# Patient Record
Sex: MALE | Race: WHITE | Employment: PART TIME | URBAN - METROPOLITAN AREA
[De-identification: names, ages, dates, MRNs, and addresses within clinical notes are randomized per-mention and may not be internally consistent; named-entity substitution may affect disease eponyms.]

---

## 2017-01-26 ENCOUNTER — OFFICE VISIT (OUTPATIENT)
Dept: SLEEP MEDICINE | Age: 47
End: 2017-01-26

## 2017-01-26 VITALS
DIASTOLIC BLOOD PRESSURE: 59 MMHG | BODY MASS INDEX: 32.49 KG/M2 | HEIGHT: 65 IN | HEART RATE: 71 BPM | SYSTOLIC BLOOD PRESSURE: 109 MMHG | TEMPERATURE: 97.3 F | OXYGEN SATURATION: 98 % | WEIGHT: 195 LBS

## 2017-01-26 DIAGNOSIS — E66.9 OBESITY (BMI 30-39.9): ICD-10-CM

## 2017-01-26 DIAGNOSIS — G47.33 OBSTRUCTIVE SLEEP APNEA (ADULT) (PEDIATRIC): Primary | ICD-10-CM

## 2017-01-26 NOTE — PATIENT INSTRUCTIONS
217 [facility] [place]. [place], [address]  Tel.  [phone]  Fax. 100 [facility] 60  [place], [address]  Tel.  [phone]  Fax. [phone] 3300 [facility] [address]  Tel.  [phone]  Fax. [phone]     PROPER SLEEP HYGIENE    What to avoid  · Do not have drinks with caffeine, such as coffee or black tea, for 8 hours before bed. · Do not smoke or use other types of tobacco near bedtime. Nicotine is a stimulant and can keep you awake. · Avoid drinking alcohol late in the evening, because it can cause you to wake in the middle of the night. · Do not eat a big meal close to bedtime. If you are hungry, eat a light snack. · Do not drink a lot of water close to bedtime, because the need to urinate may wake you up during the night. · Do not read or watch TV in bed. Use the bed only for sleeping and sexual activity. What to try  · Go to bed at the same time every night, and wake up at the same time every morning. Do not take naps during the day. · Keep your bedroom quiet, dark, and cool. · Get regular exercise, but not within 3 to 4 hours of your bedtime. .  · Sleep on a comfortable pillow and mattress. · If watching the clock makes you anxious, turn it facing away from you so you cannot see the time. · If you worry when you lie down, start a worry book. Well before bedtime, write down your worries, and then set the book and your concerns aside. · Try meditation or other relaxation techniques before you go to bed. · If you cannot fall asleep, get up and go to another room until you feel sleepy. Do something relaxing. Repeat your bedtime routine before you go to bed again. · Make your house quiet and calm about an hour before bedtime. Turn down the lights, turn off the TV, log off the computer, and turn down the volume on music. This can help you relax after a busy day.     Drowsy Driving  The [address] [place] [address] [employer] cites drowsiness as a causing factor in more than 987,308 police reported crashes annually, resulting in 76,000 injuries and 1,500 deaths. Other surveys suggest 55% of people polled have driven while drowsy in the past year, 23% had fallen asleep but not crashed, 3% crashed, and 2% had and accident due to drowsy driving. Who is at risk? Young Drivers: One study of drowsy driving accidents states that 55% of the drivers were under 25 years. Of those, 75% were male. Shift Workers and Travelers: People who work overnight or travel across time zones frequently are at higher risk of experiencing Circadian Rhythm Disorders. They are trying to work and function when their body is programed to sleep. Sleep Deprived: Lack of sleep has a serious impact on your ability to pay attention or focus on a task. Consistently getting less than the average of 8 hours your body needs creates partial or cumulative sleep deprivation. Untreated Sleep Disorders: Sleep Apnea, Narcolepsy, R.L.S., and other sleep disorders (untreated) prevent a person from getting enough restful sleep. This leads to excessive daytime sleepiness and increases the risk for drowsy driving accidents by up to 7 times. Medications / Alcohol: Even over the counter medications can cause drowsiness. Medications that impair a drivers attention should have a warning label. Alcohol naturally makes you sleepy and on its own can cause accidents. Combined with excessive drowsiness its effects are amplified. Signs of Drowsy Driving:   * You don't remember driving the last few miles   * You may drift out of your sameera   * You are unable to focus and your thoughts wander   * You may yawn more often than normal   * You have difficulty keeping your eyes open / nodding off   * Missing traffic signs, speeding, or tailgating  Prevention-   Good sleep hygiene, lifestyle and behavioral choices have the most impact on drowsy driving.  There is no substitute for sleep and the average person requires 8 hours nightly. If you find yourself driving drowsy, stop and sleep. Consider the sleep hygiene tips provided during your visit as well. Medication Refill Policy: Refills for all medications require 1 week advance notice. Please have your pharmacy fax a refill request. We are unable to fax, or call in \"controled substance\" medications and you will need to pick these prescriptions up from our office. [employer] Activation    Thank you for requesting access to [employer]. Please follow the instructions below to securely access and download your online medical record. [employer] allows you to send messages to your doctor, view your test results, renew your prescriptions, schedule appointments, and more. How Do I Sign Up? 1. In your internet browser, go to [email or web address]. 2. Click on the First Time User? Click Here link in the Sign In box. You will see the New Member Sign Up page. 3. Enter your [employer] Access Code exactly as it appears below. You will not need to use this code after youve completed the sign-up process. If you do not sign up before the expiration date, you must request a new code. [employer] Access Code: [email or web address]  Expires: 3/2/2017 11:39 AM (This is the date your [employer] access code will )    4. Enter the last four digits of your Social Security Number (xxxx) and Date of Birth (mm/dd/yyyy) as indicated and click Submit. You will be taken to the next sign-up page. 5. Create a [employer] ID. This will be your [employer] login ID and cannot be changed, so think of one that is secure and easy to remember. 6. Create a [employer] password. You can change your password at any time. 7. Enter your Password Reset Question and Answer. This can be used at a later time if you forget your password. 8. Enter your e-mail address. You will receive e-mail notification when new information is available in [address] 9. Click Sign Up.  You can now view and download portions of your medical record. 10. Click the Download Summary menu link to download a portable copy of your medical information. Additional Information    If you have questions, please call [phone]. Remember, [employer] is NOT to be used for urgent needs. For medical emergencies, dial 911.

## 2017-01-26 NOTE — PROGRESS NOTES
[address] [facility] [address] [place]. [place], [address]  Tel.  [phone]  Fax. 100 [facility] 60  [place], [address] [facility]  Tel.  [phone]  Fax. [phone] [address]   Tel.  [phone]  Fax. [phone]     S>Rogelio Garza is a 55 y.o. male seen for a positive airway pressure follow-up. He reports no problems using the device. He is 100% compliant over the past 30 days. The following problems are identified:    Drowsiness no Problems exhaling no   Snoring no Forget to put on no   Mask Comfortable yes Can't fall asleep no   Dry Mouth no Mask falls off no   Air Leaking no Frequent awakenings no       Download reviewed. He admits that his sleep has improved. No Known Allergies    He has a current medication list which includes the following prescription(s): warfarin, pantoprazole, famotidine, atorvastatin, vit b cplxc#11-calcium-dha-q10, calcium, metronidazole, azelastine, hydrocodone-acetaminophen, omeprazole, cg-af-iu-fe-min-lycopen-lutein, ketoconazole, ondansetron hcl, and diphenoxylate-atropine. .      He  has a past medical history of Arthritis; Down's syndrome; DVT (deep venous thrombosis) ([facility] [address] (7/17/2014); Gastrointestinal disorder; Other ill-defined conditions(799.89); Other ill-defined conditions(799.89); Other ill-defined conditions(799.89); Other ill-defined conditions(799.89); and Thromboembolus ([facility] [address] [place] Sleepiness Score: 17   and Modified F.O.S.Q. Score Total / 2: 19      O>    Visit Vitals    /59    Pulse 71    Temp 97.3 °F (36.3 °C)    Ht 5' 5\" (1.651 m)    Wt 195 lb (88.5 kg)    SpO2 98%    BMI 32.45 kg/m2           General:   Alert, oriented, not in distress   Neck:   No JVD    Chest/Lungs:  symetrical lung expansion , no accessory muscle use    Extremities:  no obvious rashes , negative edema    Neuro:  No focal deficits ;  No obvious tremor    Psych:  Normal affect ,  Normal countenance ; A>    ICD-10-CM ICD-9-CM    1. Obstructive sleep apnea (adult) (pediatric) G47.33 327.23    2. Obesity (BMI 30-39. 9) E66.9 278.00      AHI = 75(8-14). On CPAP :  11-13 cmH2O. Compliant:      yes    Therapeutic Response:  Positive    P>    he will continue on his current pressure settings. * He was asked to contact our office for any problems regarding PAP therapy. * Counseling was provided regarding the importance of regular PAP use and on proper sleep hygiene and safe driving. * Re-enforced proper and regular cleaning for the device. 2.Obesity - I have counseled the patient regarding the benefits of weight loss.       Héctor Mesa MD  Diplomate in Sleep Medicine  [facility]

## 2017-04-07 ENCOUNTER — APPOINTMENT (OUTPATIENT)
Dept: GENERAL RADIOLOGY | Age: 47
End: 2017-04-07
Attending: EMERGENCY MEDICINE
Payer: MEDICARE

## 2017-04-07 ENCOUNTER — HOSPITAL ENCOUNTER (EMERGENCY)
Age: 47
Discharge: HOME | End: 2017-04-07
Attending: EMERGENCY MEDICINE
Payer: MEDICARE

## 2017-04-07 VITALS
RESPIRATION RATE: 16 BRPM | TEMPERATURE: 98.3 F | BODY MASS INDEX: 32.14 KG/M2 | SYSTOLIC BLOOD PRESSURE: 108 MMHG | OXYGEN SATURATION: 98 % | DIASTOLIC BLOOD PRESSURE: 41 MMHG | HEIGHT: 65 IN | WEIGHT: 192.9 LBS | HEART RATE: 85 BPM

## 2017-04-07 DIAGNOSIS — B34.9 VIRAL ILLNESS: ICD-10-CM

## 2017-04-07 DIAGNOSIS — R11.2 NON-INTRACTABLE VOMITING WITH NAUSEA, UNSPECIFIED VOMITING TYPE: ICD-10-CM

## 2017-04-07 DIAGNOSIS — K52.9 GASTROENTERITIS: Primary | ICD-10-CM

## 2017-04-07 LAB
FLUAV AG NPH QL IA: NEGATIVE
FLUBV AG NOSE QL IA: NEGATIVE

## 2017-04-07 PROCEDURE — 99282 EMERGENCY DEPT VISIT SF MDM: CPT

## 2017-04-07 PROCEDURE — 74011250636 HC RX REV CODE- 250/636: Performed by: EMERGENCY MEDICINE

## 2017-04-07 PROCEDURE — 96374 THER/PROPH/DIAG INJ IV PUSH: CPT

## 2017-04-07 PROCEDURE — 87804 INFLUENZA ASSAY W/OPTIC: CPT | Performed by: EMERGENCY MEDICINE

## 2017-04-07 PROCEDURE — 96375 TX/PRO/DX INJ NEW DRUG ADDON: CPT

## 2017-04-07 PROCEDURE — 71020 XR CHEST PA LAT: CPT

## 2017-04-07 PROCEDURE — 96361 HYDRATE IV INFUSION ADD-ON: CPT

## 2017-04-07 RX ORDER — ONDANSETRON 4 MG/1
4 TABLET, FILM COATED ORAL
Qty: 20 TAB | Refills: 0 | Status: SHIPPED | OUTPATIENT
Start: 2017-04-07

## 2017-04-07 RX ORDER — KETOROLAC TROMETHAMINE 30 MG/ML
15 INJECTION, SOLUTION INTRAMUSCULAR; INTRAVENOUS
Status: COMPLETED | OUTPATIENT
Start: 2017-04-07 | End: 2017-04-07

## 2017-04-07 RX ORDER — ONDANSETRON 2 MG/ML
4 INJECTION INTRAMUSCULAR; INTRAVENOUS
Status: COMPLETED | OUTPATIENT
Start: 2017-04-07 | End: 2017-04-07

## 2017-04-07 RX ADMIN — KETOROLAC TROMETHAMINE 15 MG: 30 INJECTION, SOLUTION INTRAMUSCULAR at 04:34

## 2017-04-07 RX ADMIN — SODIUM CHLORIDE 1000 ML: 900 INJECTION, SOLUTION INTRAVENOUS at 04:34

## 2017-04-07 RX ADMIN — ONDANSETRON HYDROCHLORIDE 4 MG: 2 INJECTION, SOLUTION INTRAMUSCULAR; INTRAVENOUS at 04:34

## 2017-04-07 NOTE — DISCHARGE INSTRUCTIONS
Gastroenteritis: Care Instructions  Your Care Instructions  Gastroenteritis is an illness that may cause nausea, vomiting, and diarrhea. It is sometimes called \"stomach flu. \" It can be caused by bacteria or a virus. You will probably begin to feel better in 1 to 2 days. In the meantime, get plenty of rest and make sure you do not become dehydrated. Dehydration occurs when your body loses too much fluid. Follow-up care is a key part of your treatment and safety. Be sure to make and go to all appointments, and call your doctor if you are having problems. Its also a good idea to know your test results and keep a list of the medicines you take. How can you care for yourself at home? · If your doctor prescribed antibiotics, take them as directed. Do not stop taking them just because you feel better. You need to take the full course of antibiotics. · Drink plenty of fluids to prevent dehydration, enough so that your urine is light yellow or clear like water. Choose water and other caffeine-free clear liquids until you feel better. If you have kidney, heart, or liver disease and have to limit fluids, talk with your doctor before you increase your fluid intake. · Drink fluids slowly, in frequent, small amounts, because drinking too much too fast can cause vomiting. · Begin eating mild foods, such as dry toast, yogurt, applesauce, bananas, and rice. Avoid spicy, hot, or high-fat foods, and do not drink alcohol or caffeine for a day or two. Do not drink milk or eat ice cream until you are feeling better. How to prevent gastroenteritis  · Keep hot foods hot and cold foods cold. · Do not eat meats, dressings, salads, or other foods that have been kept at room temperature for more than 2 hours. · Use a thermometer to check your refrigerator. It should be between 34°F and 40°F.  · Defrost meats in the refrigerator or microwave, not on the kitchen counter. · Keep your hands and your kitchen clean.  Wash your hands, cutting boards, and countertops with hot soapy water frequently. · Cook meat until it is well done. · Do not eat raw eggs or uncooked sauces made with raw eggs. · Do not take chances. If food looks or tastes spoiled, throw it out. When should you call for help? Call 911 anytime you think you may need emergency care. For example, call if:  · You vomit blood or what looks like coffee grounds. · You passed out (lost consciousness). · You pass maroon or very bloody stools. Call your doctor now or seek immediate medical care if:  · You have severe belly pain. · You have signs of needing more fluids. You have sunken eyes, a dry mouth, and pass only a little dark urine. · You feel like you are going to faint. · You have increased belly pain that does not go away in 1 to 2 days. · You have new or increased nausea, or you are vomiting. · You have a new or higher fever. · Your stools are black and tarlike or have streaks of blood. Watch closely for changes in your health, and be sure to contact your doctor if:  · You are dizzy or lightheaded. · You urinate less than usual, or your urine is dark yellow or brown. · You do not feel better with each day that goes by. Where can you learn more? Go to http://kyara-quinton.info/. Enter N142 in the search box to learn more about \"Gastroenteritis: Care Instructions. \"  Current as of: May 24, 2016  Content Version: 11.2  © 4690-6971 [employer]. Care instructions adapted under license by [employer] (which disclaims liability or warranty for this information). If you have questions about a medical condition or this instruction, always ask your healthcare professional. [address] any warranty or liability for your use of this information.

## 2017-04-07 NOTE — ED PROVIDER NOTES
HPI Comments: Cecile Segundo is a 52 y.o. male with PMhx significant for arthritis, gastrointestinal disorder, and Down's syndrome who presents ambulatory to the ED with cc of N/V/D since 2 AM this morning. per relative, the pt also c/o associated SOB, CP, chills, dry cough, nasal congestion, rhinorrhea, sore throat, HA, and abdominal pain. The pt's relative reports that the pt has recent sick contact. The pt specifically denies fever at this time. SHx: -tobacco, -EtOH, -illicit drug use    PCP: Radha Mcdaniel MD    There are no other complaints, changes or physical findings at this time. The history is provided by the patient and a relative. No  was used.         Past Medical History:   Diagnosis Date    Arthritis     scoliosis    Down's syndrome     DVT (deep venous thrombosis) ([facility] [address] 7/17/2014    Gastrointestinal disorder     GERD, Robin's Esophagus    Other ill-defined conditions(799.89)     Down Syndrom with mild mental retardation    Other ill-defined conditions(799.89)     mixed hyperlipidemia/hypercholesteremia    Other ill-defined conditions(799.89)     myopia    Other ill-defined conditions(799.89)     sleep apnea, uses cpap at night    Thromboembolus [facility])        Past Surgical History:   Procedure Laterality Date    HX CHOLECYSTECTOMY      HX OTHER SURGICAL      convergent strabismus s/p corrective surgery         Family History:   Problem Relation Age of Onset    Hypertension Mother     Diabetes Mother     Asthma Father     Diabetes Father     Asthma Sister     Bleeding Prob Maternal Grandfather     Arthritis-osteo Paternal Grandmother     Alcohol abuse Paternal Grandmother     Lung Disease Paternal Grandmother     Hypertension Paternal Grandmother     Alcohol abuse Paternal Grandfather     Heart Disease Paternal Grandfather        Social History     Social History    Marital status: SINGLE     Spouse name: N/A    Number of children: N/A    Years of education: N/A     Occupational History    Not on file. Social History Main Topics    Smoking status: Never Smoker    Smokeless tobacco: Not on file    Alcohol use No    Drug use: No    Sexual activity: Not on file     Other Topics Concern    Not on file     Social History Narrative         ALLERGIES: Review of patient's allergies indicates no known allergies. Review of Systems   Constitutional: Positive for chills. Negative for fever. HENT: Positive for congestion (nasal), rhinorrhea and sore throat. Respiratory: Positive for cough and shortness of breath. Cardiovascular: Positive for chest pain. Gastrointestinal: Positive for abdominal pain, diarrhea, nausea and vomiting. Negative for constipation. Neurological: Positive for headaches. Negative for weakness and numbness. All other systems reviewed and are negative. Patient Vitals for the past 12 hrs:   Temp Pulse Resp BP SpO2   04/07/17 0621 - 85 16 - -   04/07/17 0327 98.3 °F (36.8 °C) 100 18 108/41 98 %            Physical Exam   Constitutional: He is oriented to person, place, and time. He appears well-developed and well-nourished. HENT:   Head: Normocephalic and atraumatic. Pt sniffling on exam.   Eyes: Conjunctivae and EOM are normal.   Neck: Normal range of motion. Neck supple. Cardiovascular: Normal rate and regular rhythm. Pulmonary/Chest: Effort normal and breath sounds normal. No respiratory distress. Abdominal: Soft. He exhibits no distension. There is tenderness (diffuse). Musculoskeletal: Normal range of motion. Neurological: He is alert and oriented to person, place, and time. Skin: Skin is warm and dry. Psychiatric: He has a normal mood and affect. Nursing note and vitals reviewed.        MDM  Number of Diagnoses or Management Options  Gastroenteritis:   Non-intractable vomiting with nausea, unspecified vomiting type:   Viral illness:   Diagnosis management comments: Nausea/Vomiting/diarrhea:  Most likely gastroenteritis although gastritis, colitis, IBD, IBS on the differential.  Will treat with antiemetic and IVF to replete losses. Will assess basic labs and electrolytes. Will give pain medication PRN. He is instructed to push clear fluids, small amounts frequently until improving, then advance diet as tolerated. Imodium OTC prn for diarrhea. May use Gatorade Pedialyte for rehydration. May use BRAT diet. Call or office visit prn if symptoms not responding as expected or develops high fever, significant abdominal pain or bloody stool. Amount and/or Complexity of Data Reviewed  Clinical lab tests: ordered and reviewed  Tests in the radiology section of CPT®: ordered and reviewed  Obtain history from someone other than the patient: yes (Relative)  Review and summarize past medical records: yes  Independent visualization of images, tracings, or specimens: yes    Patient Progress  Patient progress: stable    ED Course       Procedures    PROGRESS NOTE:  5:09 AM  The pt was reevaluated; he states that he is feeling better at this time. Written by Tika Whitney ED Scribe, as dictated by Alondra Venegas M.D.      LABORATORY TESTS:  Recent Results (from the past 12 hour(s))   INFLUENZA A & B AG (RAPID TEST)    Collection Time: 04/07/17  4:16 AM   Result Value Ref Range    Influenza A Antigen NEGATIVE  NEG      Influenza B Antigen NEGATIVE  NEG         IMAGING RESULTS:  XR CHEST PA LAT   Final Result   INDICATION: cough     EXAM: CXR 2 Views.     COMPARISON: 5/22/2014.     FINDINGS: Frontal and lateral views of the chest show new density in the left  infrahilar region which could represent nodule/mass, scarring, atelectasis or  early pneumonia. Right lung remains clear.     Heart size is normal. There is no pulmonary edema. There is no evident  pneumothorax, adenopathy or pleural effusion.         IMPRESSION  IMPRESSION: Left lung density as discussed.        MEDICATIONS GIVEN:  Medications   sodium chloride 0.9 % bolus infusion 1,000 mL (1,000 mL IntraVENous New Bag 4/7/17 0434)   ondansetron (ZOFRAN) injection 4 mg (4 mg IntraVENous Given 4/7/17 0434)   ketorolac (TORADOL) injection 15 mg (15 mg IntraVENous Given 4/7/17 0434)       IMPRESSION:  1. Gastroenteritis    2. Viral illness    3. Non-intractable vomiting with nausea, unspecified vomiting type        PLAN:  1. Discharge Medication List as of 4/7/2017  6:04 AM      CONTINUE these medications which have CHANGED    Details   ondansetron hcl (ZOFRAN, AS HYDROCHLORIDE,) 4 mg tablet Take 1 Tab by mouth every eight (8) hours as needed for Nausea. , Print, Disp-20 Tab, R-0         CONTINUE these medications which have NOT CHANGED    Details   warfarin (COUMADIN) 3 mg tablet two (2) times a day., Historical Med, R-12      pantoprazole (PROTONIX) 40 mg tablet Take 40 mg by mouth daily. , Historical Med      famotidine (PEPCID) 20 mg tablet Take 1 Tab by mouth two (2) times a day., Print, Disp-20 Tab, R-0      HYDROcodone-acetaminophen (NORCO) 5-325 mg per tablet Take 1 Tab by mouth every four (4) hours as needed for Pain., Print, Disp-20 Tab, R-0      omeprazole (PRILOSEC) 40 mg capsule Take 40 mg by mouth two (2) times a day. Historical Med, 40 mg      atorvastatin (LIPITOR) 40 mg tablet Take 40 mg by mouth daily. Historical Med, 40 mg      ZD-VH-EQ-Fe-Min-Lycopen-Lutein (CENTRUM) 0.4-162-18 mg Tab Take 1 Tab by mouth daily. Historical Med, 1 Tab      vit B cmplx & C #11-ca-dha-Q10 (BRAIN MIGHT-DHA-CO Q10) 140-10-10 mg Tab Take 1 Tab by mouth daily. Historical Med, 1 Tab      calcium 600 mg cap Take 1 Tab by mouth daily. Historical Med, 1 Tab      metroNIDAZOLE (METROGEL) 1 % topical gel Apply  to affected area daily. Use a thin layer to affected areas after washingHistorical Med      ketoconazole (XOLEGEL) 2 % topical gel Apply  to affected area daily. Historical Med      Azelastine (ASTEPRO) 0.15 % (205.5 mcg) nasal spray Use in each nostril as directed2 Sprays by Both Nostrils route as needed. Historical Med, 2 Spray      diphenoxylate-atropine (LOMOTIL) 2.5-0.025 mg per tablet Take 1 Tab by mouth four (4) times daily as needed for Diarrhea. Print, 1 Tab, Disp-20 Tab, R-0           2. Follow-up Information     Follow up With Details Comments Contact Info    Adal Prince MD  If symptoms worsen [address] [place] [address]  [phone]          Return to ED if worse       DISCHARGE NOTE:  6:08 AM  The patient has been re-evaluated and is ready for discharge. Reviewed available results with patient. Counseled patient on diagnosis and care plan. Patient has expressed understanding, and all questions have been answered. Patient agrees with plan and agrees to follow up as recommended, or return to the ED if their symptoms worsen. Discharge instructions have been provided and explained to the patient, along with reasons to return to the ED. This note is prepared by Rishi Ron, acting as Scribe for Kirill Gomez M.D. Kirill Gomez M.D: The scribe's documentation has been prepared under my direction and personally reviewed by me in its entirety. I confirm that the note above accurately reflects all work, treatment, procedures, and medical decision making performed by me.

## 2017-11-02 ENCOUNTER — HOSPITAL ENCOUNTER (OUTPATIENT)
Dept: GENERAL RADIOLOGY | Age: 47
Discharge: HOME | End: 2017-11-02
Payer: MEDICARE

## 2017-11-02 DIAGNOSIS — R05.9 COUGH: ICD-10-CM

## 2017-11-02 PROCEDURE — 71020 XR CHEST PA LAT: CPT

## 2018-01-12 ENCOUNTER — HOSPITAL ENCOUNTER (OUTPATIENT)
Dept: CT IMAGING | Age: 48
Discharge: HOME | End: 2018-01-12
Attending: FAMILY MEDICINE
Payer: MEDICARE

## 2018-01-12 DIAGNOSIS — R05.9 COUGH: ICD-10-CM

## 2018-01-12 DIAGNOSIS — R10.84 GENERALIZED ABDOMINAL PAIN: ICD-10-CM

## 2018-01-12 PROCEDURE — 74177 CT ABD & PELVIS W/CONTRAST: CPT

## 2018-01-12 PROCEDURE — 74011000255 HC RX REV CODE- 255: Performed by: FAMILY MEDICINE

## 2018-01-12 PROCEDURE — 71260 CT THORAX DX C+: CPT

## 2018-01-12 PROCEDURE — 74011250636 HC RX REV CODE- 250/636: Performed by: FAMILY MEDICINE

## 2018-01-12 PROCEDURE — 74011636320 HC RX REV CODE- 636/320: Performed by: FAMILY MEDICINE

## 2018-01-12 RX ORDER — SODIUM CHLORIDE 9 MG/ML
50 INJECTION, SOLUTION INTRAVENOUS
Status: COMPLETED | OUTPATIENT
Start: 2018-01-12 | End: 2018-01-12

## 2018-01-12 RX ORDER — SODIUM CHLORIDE 0.9 % (FLUSH) 0.9 %
10 SYRINGE (ML) INJECTION
Status: COMPLETED | OUTPATIENT
Start: 2018-01-12 | End: 2018-01-12

## 2018-01-12 RX ORDER — BARIUM SULFATE 20 MG/ML
900 SUSPENSION ORAL
Status: COMPLETED | OUTPATIENT
Start: 2018-01-12 | End: 2018-01-12

## 2018-01-12 RX ADMIN — BARIUM SULFATE 900 ML: 21 SUSPENSION ORAL at 13:33

## 2018-01-12 RX ADMIN — Medication 10 ML: at 13:33

## 2018-01-12 RX ADMIN — SODIUM CHLORIDE 50 ML/HR: 900 INJECTION, SOLUTION INTRAVENOUS at 13:33

## 2018-01-12 RX ADMIN — IOPAMIDOL 95 ML: 755 INJECTION, SOLUTION INTRAVENOUS at 13:33

## 2018-01-23 ENCOUNTER — TELEPHONE (OUTPATIENT)
Dept: SLEEP MEDICINE | Age: 48
End: 2018-01-23

## 2018-01-23 DIAGNOSIS — G47.33 OBSTRUCTIVE SLEEP APNEA SYNDROME: Primary | ICD-10-CM

## 2018-01-24 ENCOUNTER — DOCUMENTATION ONLY (OUTPATIENT)
Dept: SLEEP MEDICINE | Age: 48
End: 2018-01-24

## 2018-04-10 ENCOUNTER — HOSPITAL ENCOUNTER (OUTPATIENT)
Dept: CT IMAGING | Age: 48
Discharge: HOME | End: 2018-04-10
Attending: OTOLARYNGOLOGY
Payer: MEDICARE

## 2018-04-10 DIAGNOSIS — M47.812 NECK ARTHROPATHY: ICD-10-CM

## 2018-04-10 PROCEDURE — 74011636320 HC RX REV CODE- 636/320: Performed by: OTOLARYNGOLOGY

## 2018-04-10 PROCEDURE — 70491 CT SOFT TISSUE NECK W/DYE: CPT

## 2018-04-10 PROCEDURE — 74011250636 HC RX REV CODE- 250/636: Performed by: OTOLARYNGOLOGY

## 2018-04-10 RX ORDER — SODIUM CHLORIDE 9 MG/ML
50 INJECTION, SOLUTION INTRAVENOUS
Status: COMPLETED | OUTPATIENT
Start: 2018-04-10 | End: 2018-04-10

## 2018-04-10 RX ORDER — SODIUM CHLORIDE 0.9 % (FLUSH) 0.9 %
10 SYRINGE (ML) INJECTION
Status: COMPLETED | OUTPATIENT
Start: 2018-04-10 | End: 2018-04-10

## 2018-04-10 RX ADMIN — Medication 10 ML: at 08:42

## 2018-04-10 RX ADMIN — IOPAMIDOL 100 ML: 755 INJECTION, SOLUTION INTRAVENOUS at 08:42

## 2018-04-10 RX ADMIN — SODIUM CHLORIDE 50 ML/HR: 900 INJECTION, SOLUTION INTRAVENOUS at 08:42

## 2018-05-07 ENCOUNTER — DOCUMENTATION ONLY (OUTPATIENT)
Dept: SLEEP MEDICINE | Age: 48
End: 2018-05-07

## 2018-05-07 ENCOUNTER — OFFICE VISIT (OUTPATIENT)
Dept: SLEEP MEDICINE | Age: 48
End: 2018-05-07

## 2018-05-07 VITALS
HEIGHT: 65 IN | SYSTOLIC BLOOD PRESSURE: 106 MMHG | HEART RATE: 71 BPM | OXYGEN SATURATION: 95 % | BODY MASS INDEX: 33.99 KG/M2 | WEIGHT: 204 LBS | DIASTOLIC BLOOD PRESSURE: 77 MMHG

## 2018-05-07 DIAGNOSIS — G47.33 OBSTRUCTIVE SLEEP APNEA SYNDROME: Primary | ICD-10-CM

## 2018-05-07 DIAGNOSIS — E66.9 OBESITY (BMI 30-39.9): ICD-10-CM

## 2018-05-07 RX ORDER — SODIUM, POTASSIUM,MAG SULFATES 17.5-3.13G
SOLUTION, RECONSTITUTED, ORAL ORAL
Refills: 0 | COMMUNITY
Start: 2018-05-03 | End: 2018-05-30

## 2018-05-07 NOTE — PROGRESS NOTES
[address] [facility]. [place], [address]  Tel.  [phone]  Fax. 100 [facility] 60  [place], [address] [facility]  Tel.  [phone]  Fax. [phone] [address]   Tel.  [phone]  Fax. [phone]     S>Rogelio Hammond is a 50 y.o. male seen for a positive airway pressure follow-up. He reports no problems using the device. The following problems are identified:    Drowsiness no Problems exhaling no   Snoring no Forget to put on no   Mask Comfortable yes Can't fall asleep no   Dry Mouth no Mask falls off no   Air Leaking no Frequent awakenings no     Download reviewed. He admits that his sleep has improved. Therapy Apnea Index averaged over PAP use: 4 /hr which reflects improved sleep breathing condition. No Known Allergies    He has a current medication list which includes the following prescription(s): suprep bowel prep kit, ondansetron hcl, warfarin, pantoprazole, famotidine, atorvastatin, vit b cplxc#11-calcium-dha-q10, calcium, metronidazole, azelastine, hydrocodone-acetaminophen, omeprazole, kf-jc-is-fe-min-lycopen-lutein, ketoconazole, and diphenoxylate-atropine. .      He  has a past medical history of Arthritis; Down's syndrome; [facility]) [facility] (7/17/2014); Gastrointestinal disorder; Other ill-defined conditions(799.89); Other ill-defined conditions(799.89); Other ill-defined conditions(799.89); Other ill-defined conditions(799.89); and Thromboembolus ([facility] [place] Sleepiness Score: 19   and Modified F.O.S.Q. Score Total / 2: 19.5   which reflect improved sleep quality over therapy time.     O>    Visit Vitals    /77    Pulse 71    Ht 5' 5\" (1.651 m)    Wt 204 lb (92.5 kg)    SpO2 95%    BMI 33.95 kg/m2           General:   Alert, oriented, not in distress   Neck:   No JVD    Chest/Lungs:  symetrical lung expansion , no accessory muscle use    Extremities:  no obvious rashes , negative edema    Neuro:  No focal deficits ; No obvious tremor    Psych:  Normal affect ,  Normal countenance ;         A>    ICD-10-CM ICD-9-CM    1. Obstructive sleep apnea syndrome G47.33 327.23 AMB SUPPLY ORDER   2. Obesity (BMI 30-39. 9) E66.9 278.00      AHI = 75(8-14). On CPAP :  11-13 cmH2O. Compliant:      yes    Therapeutic Response:  Positive    P>      * Follow-up Disposition:  Return in about 1 year (around 5/7/2019). he will continue on his current pressure settings. I have ordered replacement supplies    * He was asked to contact our office for any problems regarding PAP therapy. * Counseling was provided regarding the importance of regular PAP use and on proper sleep hygiene and safe driving. * Re-enforced proper and regular cleaning for the device. 2. Obesity - I have counseled the patient regarding the benefits of weight loss.     Yolanda Dickerson MD  Diplomate in Sleep Medicine  [facility]

## 2018-05-07 NOTE — PATIENT INSTRUCTIONS
[address] [place]. [place], [address]  Tel.  [phone]  Fax. 100 [facility] 60  [place], [address]  Tel.  [phone]  Fax. [phone] [address] [facility] [address]  Tel.  [phone]  Fax. [phone]     PROPER SLEEP HYGIENE    What to avoid  · Do not have drinks with caffeine, such as coffee or black tea, for 8 hours before bed. · Do not smoke or use other types of tobacco near bedtime. Nicotine is a stimulant and can keep you awake. · Avoid drinking alcohol late in the evening, because it can cause you to wake in the middle of the night. · Do not eat a big meal close to bedtime. If you are hungry, eat a light snack. · Do not drink a lot of water close to bedtime, because the need to urinate may wake you up during the night. · Do not read or watch TV in bed. Use the bed only for sleeping and sexual activity. What to try  · Go to bed at the same time every night, and wake up at the same time every morning. Do not take naps during the day. · Keep your bedroom quiet, dark, and cool. · Get regular exercise, but not within 3 to 4 hours of your bedtime. .  · Sleep on a comfortable pillow and mattress. · If watching the clock makes you anxious, turn it facing away from you so you cannot see the time. · If you worry when you lie down, start a worry book. Well before bedtime, write down your worries, and then set the book and your concerns aside. · Try meditation or other relaxation techniques before you go to bed. · If you cannot fall asleep, get up and go to another room until you feel sleepy. Do something relaxing. Repeat your bedtime routine before you go to bed again. · Make your house quiet and calm about an hour before bedtime. Turn down the lights, turn off the TV, log off the computer, and turn down the volume on music. This can help you relax after a busy day.     Drowsy Driving  The [address] [place] [address] Road Traffic Safety Administration cites drowsiness as a causing factor in more than 785,543 police reported crashes annually, resulting in 76,000 injuries and 1,500 deaths. Other surveys suggest 55% of people polled have driven while drowsy in the past year, 23% had fallen asleep but not crashed, 3% crashed, and 2% had and accident due to drowsy driving. Who is at risk? Young Drivers: One study of drowsy driving accidents states that 55% of the drivers were under 25 years. Of those, 75% were male. Shift Workers and Travelers: People who work overnight or travel across time zones frequently are at higher risk of experiencing Circadian Rhythm Disorders. They are trying to work and function when their body is programed to sleep. Sleep Deprived: Lack of sleep has a serious impact on your ability to pay attention or focus on a task. Consistently getting less than the average of 8 hours your body needs creates partial or cumulative sleep deprivation. Untreated Sleep Disorders: Sleep Apnea, Narcolepsy, R.L.S., and other sleep disorders (untreated) prevent a person from getting enough restful sleep. This leads to excessive daytime sleepiness and increases the risk for drowsy driving accidents by up to 7 times. Medications / Alcohol: Even over the counter medications can cause drowsiness. Medications that impair a drivers attention should have a warning label. Alcohol naturally makes you sleepy and on its own can cause accidents. Combined with excessive drowsiness its effects are amplified. Signs of Drowsy Driving:   * You don't remember driving the last few miles   * You may drift out of your sameera   * You are unable to focus and your thoughts wander   * You may yawn more often than normal   * You have difficulty keeping your eyes open / nodding off   * Missing traffic signs, speeding, or tailgating  Prevention-   Good sleep hygiene, lifestyle and behavioral choices have the most impact on drowsy driving.  There is no substitute for sleep and the average person requires 8 hours nightly. If you find yourself driving drowsy, stop and sleep. Consider the sleep hygiene tips provided during your visit as well. Medication Refill Policy: Refills for all medications require 1 week advance notice. Please have your pharmacy fax a refill request. We are unable to fax, or call in \"controled substance\" medications and you will need to pick these prescriptions up from our office. [employer] Activation    Thank you for requesting access to [employer]. Please follow the instructions below to securely access and download your online medical record. [employer] allows you to send messages to your doctor, view your test results, renew your prescriptions, schedule appointments, and more. How Do I Sign Up? 1. In your internet browser, go to [email or web address]. 2. Click on the First Time User? Click Here link in the Sign In box. You will see the New Member Sign Up page. 3. Enter your [employer] Access Code exactly as it appears below. You will not need to use this code after youve completed the sign-up process. If you do not sign up before the expiration date, you must request a new code. [employer] Access Code: [email or web address]  Expires: 2018  9:59 AM (This is the date your [employer] access code will )    4. Enter the last four digits of your Social Security Number (xxxx) and Date of Birth (mm/dd/yyyy) as indicated and click Submit. You will be taken to the next sign-up page. 5. Create a [employer] ID. This will be your [employer] login ID and cannot be changed, so think of one that is secure and easy to remember. 6. Create a [employer] password. You can change your password at any time. 7. Enter your Password Reset Question and Answer. This can be used at a later time if you forget your password. 8. Enter your e-mail address. You will receive e-mail notification when new information is available in [address] 9. Click Sign Up.  You can now view and download portions of your medical record. 10. Click the Download Summary menu link to download a portable copy of your medical information. Additional Information    If you have questions, please call [phone]. Remember, [employer] is NOT to be used for urgent needs. For medical emergencies, dial 911.

## 2018-05-31 ENCOUNTER — HOSPITAL ENCOUNTER (OUTPATIENT)
Age: 48
Setting detail: OUTPATIENT SURGERY
Discharge: HOME | End: 2018-05-31
Attending: INTERNAL MEDICINE | Admitting: INTERNAL MEDICINE
Payer: MEDICARE

## 2018-05-31 ENCOUNTER — ANESTHESIA (OUTPATIENT)
Dept: ENDOSCOPY | Age: 48
End: 2018-05-31
Payer: MEDICARE

## 2018-05-31 ENCOUNTER — ANESTHESIA EVENT (OUTPATIENT)
Dept: ENDOSCOPY | Age: 48
End: 2018-05-31
Payer: MEDICARE

## 2018-05-31 VITALS
OXYGEN SATURATION: 98 % | BODY MASS INDEX: 32.53 KG/M2 | SYSTOLIC BLOOD PRESSURE: 112 MMHG | HEIGHT: 65 IN | HEART RATE: 61 BPM | WEIGHT: 195.25 LBS | TEMPERATURE: 97.5 F | RESPIRATION RATE: 18 BRPM | DIASTOLIC BLOOD PRESSURE: 64 MMHG

## 2018-05-31 PROCEDURE — 77030009426 HC FCPS BIOP ENDOSC BSC -B: Performed by: INTERNAL MEDICINE

## 2018-05-31 PROCEDURE — 76060000031 HC ANESTHESIA FIRST 0.5 HR: Performed by: INTERNAL MEDICINE

## 2018-05-31 PROCEDURE — 74011000250 HC RX REV CODE- 250

## 2018-05-31 PROCEDURE — 74011250636 HC RX REV CODE- 250/636

## 2018-05-31 PROCEDURE — 88305 TISSUE EXAM BY PATHOLOGIST: CPT | Performed by: INTERNAL MEDICINE

## 2018-05-31 PROCEDURE — 76040000019: Performed by: INTERNAL MEDICINE

## 2018-05-31 PROCEDURE — 74011250636 HC RX REV CODE- 250/636: Performed by: INTERNAL MEDICINE

## 2018-05-31 RX ORDER — DEXTROMETHORPHAN/PSEUDOEPHED 2.5-7.5/.8
1.2 DROPS ORAL
Status: DISCONTINUED | OUTPATIENT
Start: 2018-05-31 | End: 2018-05-31 | Stop reason: HOSPADM

## 2018-05-31 RX ORDER — SODIUM CHLORIDE 0.9 % (FLUSH) 0.9 %
5-10 SYRINGE (ML) INJECTION EVERY 8 HOURS
Status: DISCONTINUED | OUTPATIENT
Start: 2018-05-31 | End: 2018-05-31 | Stop reason: HOSPADM

## 2018-05-31 RX ORDER — PROPOFOL 10 MG/ML
INJECTION, EMULSION INTRAVENOUS AS NEEDED
Status: DISCONTINUED | OUTPATIENT
Start: 2018-05-31 | End: 2018-05-31 | Stop reason: HOSPADM

## 2018-05-31 RX ORDER — LIDOCAINE HYDROCHLORIDE 20 MG/ML
INJECTION, SOLUTION EPIDURAL; INFILTRATION; INTRACAUDAL; PERINEURAL AS NEEDED
Status: DISCONTINUED | OUTPATIENT
Start: 2018-05-31 | End: 2018-05-31 | Stop reason: HOSPADM

## 2018-05-31 RX ORDER — EPINEPHRINE 0.1 MG/ML
1 INJECTION INTRACARDIAC; INTRAVENOUS
Status: DISCONTINUED | OUTPATIENT
Start: 2018-05-31 | End: 2018-05-31 | Stop reason: HOSPADM

## 2018-05-31 RX ORDER — NALOXONE HYDROCHLORIDE 0.4 MG/ML
0.4 INJECTION, SOLUTION INTRAMUSCULAR; INTRAVENOUS; SUBCUTANEOUS
Status: DISCONTINUED | OUTPATIENT
Start: 2018-05-31 | End: 2018-05-31 | Stop reason: HOSPADM

## 2018-05-31 RX ORDER — SODIUM CHLORIDE 0.9 % (FLUSH) 0.9 %
5-10 SYRINGE (ML) INJECTION AS NEEDED
Status: DISCONTINUED | OUTPATIENT
Start: 2018-05-31 | End: 2018-05-31 | Stop reason: HOSPADM

## 2018-05-31 RX ORDER — FLUMAZENIL 0.1 MG/ML
0.2 INJECTION INTRAVENOUS
Status: DISCONTINUED | OUTPATIENT
Start: 2018-05-31 | End: 2018-05-31 | Stop reason: HOSPADM

## 2018-05-31 RX ORDER — SODIUM CHLORIDE 9 MG/ML
50 INJECTION, SOLUTION INTRAVENOUS CONTINUOUS
Status: DISCONTINUED | OUTPATIENT
Start: 2018-05-31 | End: 2018-05-31 | Stop reason: HOSPADM

## 2018-05-31 RX ORDER — ATROPINE SULFATE 0.1 MG/ML
0.5 INJECTION INTRAVENOUS
Status: DISCONTINUED | OUTPATIENT
Start: 2018-05-31 | End: 2018-05-31 | Stop reason: HOSPADM

## 2018-05-31 RX ADMIN — PROPOFOL 20 MG: 10 INJECTION, EMULSION INTRAVENOUS at 07:59

## 2018-05-31 RX ADMIN — PROPOFOL 30 MG: 10 INJECTION, EMULSION INTRAVENOUS at 07:52

## 2018-05-31 RX ADMIN — PROPOFOL 20 MG: 10 INJECTION, EMULSION INTRAVENOUS at 07:56

## 2018-05-31 RX ADMIN — PROPOFOL 50 MG: 10 INJECTION, EMULSION INTRAVENOUS at 07:45

## 2018-05-31 RX ADMIN — LIDOCAINE HYDROCHLORIDE 60 MG: 20 INJECTION, SOLUTION EPIDURAL; INFILTRATION; INTRACAUDAL; PERINEURAL at 07:45

## 2018-05-31 RX ADMIN — SODIUM CHLORIDE: 900 INJECTION, SOLUTION INTRAVENOUS at 07:30

## 2018-05-31 RX ADMIN — PROPOFOL 20 MG: 10 INJECTION, EMULSION INTRAVENOUS at 07:48

## 2018-05-31 NOTE — DISCHARGE INSTRUCTIONS
Arminda Magallon  [id number]  1970    COLON DISCHARGE INSTRUCTIONS  Discomfort:  Redness at IV site- apply warm compress to area; if redness or soreness persist- contact your physician  There may be a slight amount of blood passed from the rectum  Gaseous discomfort- walking, belching will help relieve any discomfort  You may not operate a vehicle for 12 hours  You may not engage in an occupation involving machinery or appliances for rest of today  You may not drink alcoholic beverages for at least 12 hours  Avoid making any critical decisions for at least 24 hour  DIET:   Regular diet    - however -  remember your colon is empty and a heavy meal will produce gas. ACTIVITY:  It is recommended that you spend the remainder of the day resting -  avoid any strenuous activity. CALL M.D.   ANY SIGN OF:   Increasing pain, nausea, vomiting  Abdominal distension (swelling)  New increased bleeding (oral or rectal)  Fever (chills)    Follow-up Instructions:   Call Dr. Leonid Pappas results in 10 days  Telephone # [phone]  Brief Findings: normal        DISCHARGE SUMMARY from Nurse    The following personal items collected during your admission are returned to you:   Dental Appliance: Dental Appliances: None  Vision: Visual Aid: Glasses  Hearing Aid:    Jewelry:    Clothing:    Other Valuables:    Valuables sent to safe:

## 2018-05-31 NOTE — H&P
Gastroenterology History and Physical    Patient: Jazlyn Jay    Physician: Linwood Santos MD    Vital Signs: Blood pressure 133/67, pulse 66, temperature 98 °F (36.7 °C), resp. rate 27, height 5' 5\" (1.651 m), weight 88.6 kg (195 lb 4 oz), SpO2 98 %. Allergies: No Known Allergies    Indication: diarrhea    History:  Past Medical History:   Diagnosis Date    Arthritis     scoliosis    Down's syndrome     DVT (deep venous thrombosis) ([facility] 7/17/2014    Gastrointestinal disorder     GERD, Robin's Esophagus    Other ill-defined conditions(799.89)     Down Syndrom with mild mental retardation    Other ill-defined conditions(799.89)     mixed hyperlipidemia/hypercholesteremia    Other ill-defined conditions(799.89)     myopia    Other ill-defined conditions(799.89)     sleep apnea, uses cpap at night    Sleep apnea     uses cpap    Thromboembolus ([facility]       Past Surgical History:   Procedure Laterality Date    HX CHOLECYSTECTOMY      HX OTHER SURGICAL      convergent strabismus s/p corrective surgery      Social History     Social History    Marital status: SINGLE     Spouse name: N/A    Number of children: N/A    Years of education: N/A     Social History Main Topics    Smoking status: Never Smoker    Smokeless tobacco: Never Used    Alcohol use No    Drug use: No    Sexual activity: Not Asked     Other Topics Concern    None     Social History Narrative      Family History   Problem Relation Age of Onset    Hypertension Mother     Diabetes Mother     Asthma Father     Diabetes Father     Asthma Sister     Bleeding Prob Maternal Grandfather     Arthritis-osteo Paternal Grandmother     Alcohol abuse Paternal Grandmother     Lung Disease Paternal Grandmother     Hypertension Paternal Grandmother     Alcohol abuse Paternal Grandfather     Heart Disease Paternal Grandfather        Medications:   Prior to Admission medications    Medication Sig Start Date End Date Taking? Authorizing Provider   vit B cmplx & C #11-ca-dha-Q10 (BRAIN MIGHT-DHA-CO Q10) 140-10-10 mg Tab Take 1 Tab by mouth daily. Yes Coleen Jarvis MD   ondansetron hcl (ZOFRAN, AS HYDROCHLORIDE,) 4 mg tablet Take 1 Tab by mouth every eight (8) hours as needed for Nausea. 4/7/17   Meryl Moseley MD   famotidine (PEPCID) 20 mg tablet Take 1 Tab by mouth two (2) times a day. 5/22/14   Adelaida Abbott MD   atorvastatin (LIPITOR) 40 mg tablet Take 40 mg by mouth nightly. Coleen Jarvis MD   LP-RT-YF-Fe-Min-Lycopen-Lutein (CENTRUM) 0.4-162-18 mg Tab Take 1 Tab by mouth daily. Coleen Jarvis MD   calcium 600 mg cap Take 1 Tab by mouth daily. Coleen Jarvis MD   Azelastine (ASTEPRO) 0.15 % (205.5 mcg) nasal spray 2 Sprays by Both Nostrils route as needed. Coleen Jarvis MD       Physical Exam:   HEENT: Head: Normocephalic, no lesions, without obvious abnormality.    Heart: regular rate and rhythm, S1, S2 normal, no murmur, click, rub or gallop   Lungs: chest clear, no wheezing, rales, normal symmetric air entry, Heart exam - S1, S2 normal, no murmur, no gallop, rate regular   Abdominal: Bowel sounds are normal, liver is not enlarged, spleen is not enlarged     Signed:  Raiba Stanley MD 5/31/2018

## 2018-05-31 NOTE — ANESTHESIA POSTPROCEDURE EVALUATION
Post-Anesthesia Evaluation and Assessment    Patient: Sanju Carey MRN: [id number]  SSN: [id number]    YOB: 1970  Age: 50 y.o. Sex: male       Cardiovascular Function/Vital Signs  Visit Vitals    /69    Pulse 71    Temp 36.7 °C (98 °F)    Resp 20    Ht 5' 5\" (1.651 m)    Wt 88.6 kg (195 lb 4 oz)    SpO2 100%    BMI 32.49 kg/m2       Patient is status post general, total IV anesthesia anesthesia for Procedure(s):  COLONOSCOPY  COLON BIOPSY. Nausea/Vomiting: None    Postoperative hydration reviewed and adequate. Pain:  Pain Scale 1: Numeric (0 - 10) (05/31/18 0806)  Pain Intensity 1: 0 (05/31/18 0806)   Managed    Neurological Status: At baseline    Mental Status and Level of Consciousness: Arousable    Pulmonary Status:   O2 Device: Room air (05/31/18 0806)   Adequate oxygenation and airway patent    Complications related to anesthesia: None    Post-anesthesia assessment completed.  No concerns    Signed By: Pranay Judge MD     May 31, 2018

## 2018-05-31 NOTE — IP AVS SNAPSHOT
[address] [facility] 
[phone] Patient: Elisha Laughlin MRN: [id number] LME:7/07/0494 About your hospitalization You were admitted on:  May 31, 2018 You last received care in the:  [facility] ENDOSCOPY You were discharged on:  May 31, 2018 Why you were hospitalized Your primary diagnosis was:  Not on File Follow-up Information None Discharge Orders None A check angeline indicates which time of day the medication should be taken. My Medications CONTINUE taking these medications Instructions Each Dose to Equal  
 Morning Noon Evening Bedtime ASTEPRO 0.15 % (205.5 mcg) nasal spray Generic drug:  Azelastine Your last dose was: Your next dose is: 2 Sprays by Both Nostrils route as needed. 2 Spray BRAIN MIGHT-DHA-CO Q10 140-10-10 mg Tab Generic drug:  vit B cplxC#11-calcium-dha-Q10 Your last dose was: Your next dose is: Take 1 Tab by mouth daily. 1 Tab  
    
   
   
   
  
 calcium 600 mg Cap Your last dose was: Your next dose is: Take 1 Tab by mouth daily. 1 Tab CENTRUM 0.4-162-18 mg Tab Generic drug:  QH-EJ-ZV-Fe-Min-Lycopen-Lutein Your last dose was: Your next dose is: Take 1 Tab by mouth daily. 1 Tab  
    
   
   
   
  
 famotidine 20 mg tablet Commonly known as:  PEPCID Your last dose was: Your next dose is: Take 1 Tab by mouth two (2) times a day. 20 mg  
    
   
   
   
  
 LIPITOR 40 mg tablet Generic drug:  atorvastatin Your last dose was: Your next dose is: Take 40 mg by mouth nightly. 40 mg  
    
   
   
   
  
 ondansetron hcl 4 mg tablet Commonly known as:  Brijesh Appl Your last dose was: Your next dose is: Take 1 Tab by mouth every eight (8) hours as needed for Nausea. 4 mg Discharge Instructions Nena Ley [id number] 
1970 COLON DISCHARGE INSTRUCTIONS Discomfort: 
Redness at IV site- apply warm compress to area; if redness or soreness persist- contact your physician There may be a slight amount of blood passed from the rectum Gaseous discomfort- walking, belching will help relieve any discomfort You may not operate a vehicle for 12 hours You may not engage in an occupation involving machinery or appliances for rest of today You may not drink alcoholic beverages for at least 12 hours Avoid making any critical decisions for at least 24 hour DIET: 
 Regular diet  however -  remember your colon is empty and a heavy meal will produce gas. ACTIVITY: It is recommended that you spend the remainder of the day resting -  avoid any strenuous activity. CALL M.D. ANY SIGN OF: Increasing pain, nausea, vomiting Abdominal distension (swelling) New increased bleeding (oral or rectal) Fever (chills) Follow-up Instructions: 
 Call Dr. Rodrigo Acevedo Biopsy results in 10 days Telephone # [phone] Brief Findings: normal 
 
 
 
DISCHARGE SUMMARY from Nurse The following personal items collected during your admission are returned to you:  
Dental Appliance: Dental Appliances: None Vision: Visual Aid: Glasses Hearing Aid:   
Jewelry:   
Clothing:   
Other Valuables:   
Valuables sent to safe:   
 
 
[facility] Transitions of Care [address] offers a voluntary care coordination program to provide high quality service and care to Cristi Stoddard fee-for-service beneficiaries. Jo Dias was designed to help you enhance your health and well-being through the following services: ? Transitions of Care  support for individuals who are transitioning from one care setting to another (example: Hospital to home). ? Chronic and Complex Care Coordination  support for individuals and caregivers of those with serious or chronic illnesses or with more than one chronic (ongoing) condition and those who take a number of different medications. If you meet specific medical criteria, a [facility] may call you directly to coordinate your care with your primary care physician and your other care providers. For questions about the [facility] programs, please, contact your physicians office. For general questions or additional information about Accountable Care Organizations: 
Please visit www.medicare.gov/acos. html or call 1-800-MEDICARE (5-548.519.1466) TTY users should call 6-663.363.8871. Introducing [facility]! [facility] introduces [employer] patient portal. Now you can access parts of your medical record, email your doctor's office, and request medication refills online. 1. In your internet browser, go to [email or web address] 2. Click on the First Time User? Click Here link in the Sign In box. You will see the New Member Sign Up page. 3. Enter your [employer] Access Code exactly as it appears below. You will not need to use this code after youve completed the sign-up process. If you do not sign up before the expiration date, you must request a new code. · [employer] Access Code: [email or web address] Expires: 6/28/2018  9:59 AM 
 
4. Enter the last four digits of your Social Security Number (xxxx) and Date of Birth (mm/dd/yyyy) as indicated and click Submit. You will be taken to the next sign-up page. 5. Create a Hipcrickett ID. This will be your [employer] login ID and cannot be changed, so think of one that is secure and easy to remember. 6. Create a Hipcrickett password. You can change your password at any time. 7. Enter your Password Reset Question and Answer.  This can be used at a later time if you forget your password. 8. Enter your e-mail address. You will receive e-mail notification when new information is available in [address] 9. Click Sign Up. You can now view and download portions of your medical record. 10. Click the Download Summary menu link to download a portable copy of your medical information. If you have questions, please visit the Frequently Asked Questions section of the [employer] website. Remember, [employer] is NOT to be used for urgent needs. For medical emergencies, dial 911. Now available from your iPhone and Android! Introducing John Burton As a [employer] patient, I wanted to make you aware of our electronic visit tool called John Burton. [employer] 24/7 allows you to connect within minutes with a medical provider 24 hours a day, seven days a week via a mobile device or tablet or logging into a secure website from your computer. You can access John Burton from anywhere in the [place]. A virtual visit might be right for you when you have a simple condition and feel like you just dont want to get out of bed, or cant get away from work for an appointment, when your regular [employer] provider is not available (evenings, weekends or holidays), or when youre out of town and need minor care. Electronic visits cost only $49 and if the [employer] 24/7 provider determines a prescription is needed to treat your condition, one can be electronically transmitted to a nearby pharmacy*. Please take a moment to enroll today if you have not already done so. The enrollment process is free and takes just a few minutes. To enroll, please download the [employer] 24/7 vanessa to your tablet or phone, or visit [email or web address] to enroll on your computer.    
And, as an [address] [place] [address] patient with a [employer] account, the results of your visits will be scanned into your electronic medical record and your primary care provider will be able to view the scanned results. We urge you to continue to see your regular [facility] provider for your ongoing medical care. And while your primary care provider may not be the one available when you seek a Pictoriousjose luisfin virtual visit, the peace of mind you get from getting a real diagnosis real time can be priceless. For more information on [employer], view our Frequently Asked Questions (FAQs) at [email or web address]. Sincerely, 
 
Dianah Sicard, MD 
Chief Medical Officer [address] *:  certain medications cannot be prescribed via [employer] Providers Seen During Your Hospitalization Provider Specialty Primary office phone Nkechi Pollock MD Gastroenterology [phone] Your Primary Care Physician (PCP) Primary Care Physician Office Phone Office Fax Havenjosé miguel Morleysaeid [phone] You are allergic to the following No active allergies Recent Documentation Height Weight BMI Smoking Status 1.651 m 88.6 kg 32.49 kg/m2 Never Smoker Emergency Contacts Name Discharge Info Relation Home Work Mobile [address] CAREGIVER [3] Parent [1] [phone] [id number] Meghana Garcia Julian DISCHARGE CAREGIVER [3] Other Relative [6] [phone] Fouzia Drew DISCHARGE CAREGIVER [3] Sister [23] [phone] Fidencio Osborn DISCHARGE CAREGIVER [3] Parent [1]   [phone] Patient Belongings The following personal items are in your possession at time of discharge: 
  Dental Appliances: None  Visual Aid: Glasses Please provide this summary of care documentation to your next provider. Signatures-by signing, you are acknowledging that this After Visit Summary has been reviewed with you and you have received a copy. Patient Signature:  ____________________________________________________________ Date:  ____________________________________________________________  
  
Gearldine Moulds Provider Signature:  ____________________________________________________________ Date:  ____________________________________________________________

## 2018-05-31 NOTE — PROCEDURES
Colonoscopy Operative Report  Arminda Magallon  1970  [id number]      Procedure Type:   Colonoscopy with biopsy     Indications:     Diarrhea    Pre-operative Diagnosis: see indication above    Post-operative Diagnosis:  See findings below    : Trina Lawrence MD    Referring Provider: Gillian Garcia    Sedation:  MAC anesthesia Propofol    Pre-Procedural Exam:      Airway: clear, Malimpati 2   Heart: RRR, without gallops or rubs  Lungs: clear bilaterally without wheezes, crackles, or rhonchi  Abdomen: soft, nontender, nondistended, bowel sounds present     Procedure Details:  After informed consent was obtained with all risks and benefits of procedure explained and preoperative exam completed, the patient was taken to the endoscopy suite and placed in the left lateral decubitus position. Upon sequential sedation as per above, a digital rectal exam was performed. The Olympus videocolonoscope [id number] was inserted in the rectum and carefully advanced to the cecum, which was identified by the cecal strap. The quality of preparation was good. The colonoscope was slowly withdrawn with careful evaluation between folds. Retoflexion in the rectum was completed. Findings/Impression: ANUS: Anal exam reveals no masses or hemorrhoids, sphincter tone is normal.   RECTUM: Rectal exam reveals no masses or hemorrhoids. SIGMOID COLON: The mucosa is normal with good vascular pattern and without ulcers, diverticula, and polyps. DESCENDING COLON: The mucosa is normal with good vascular pattern and without ulcers, diverticula, and polyps. SPLENIC FLEXURE: The splenic flexure is normal.   TRANSVERSE COLON: The mucosa is normal with good vascular pattern and without ulcers, diverticula, and polyps. HEPATIC FLEXURE: The hepatic flexure is normal.   ASCENDING COLON: The mucosa is normal with good vascular pattern and without ulcers, diverticula, and polyps.    CECUM: The appendiceal orifice appears normal. The ileocecal valve appears normal.   TERMINAL ILEUM: The terminal ileum was not entered. Specimen Removed:  Random colon bx to r/o microscopic colitis    Complications: None. EBL:  None. Recommendations: --Await pathology. , -For colon cancer screening in this average-risk patient, colonoscopy may be repeated in 10 years. , -Follow up with primary care physician. Regular diet. Resume normal medication(s). You may be asked to call your doctor's office for the results. Discharge Disposition:  Home in the company of a  when able to ambulate.

## 2018-05-31 NOTE — PERIOP NOTES
Anesthesia reports 140 mg Propofol, 60 mg Lidocaine and 250 mL NS given during procedure. Received report from anesthesia staff on vital signs and status of patient.

## 2018-05-31 NOTE — ANESTHESIA PREPROCEDURE EVALUATION
Anesthetic History   No history of anesthetic complications            Review of Systems / Medical History  Patient summary reviewed, nursing notes reviewed and pertinent labs reviewed    Pulmonary        Sleep apnea: CPAP           Neuro/Psych   Within defined limits           Cardiovascular  Within defined limits                Exercise tolerance: >4 METS     GI/Hepatic/Renal     GERD: well controlled           Endo/Other        Obesity and arthritis     Other Findings   Comments:  Down Syndrom with mild mental retardation          Physical Exam    Airway  Mallampati: III  TM Distance: 4 - 6 cm  Neck ROM: normal range of motion   Mouth opening: Normal     Cardiovascular    Rhythm: regular  Rate: normal         Dental    Dentition: Lower dentition intact and Upper dentition intact     Pulmonary  Breath sounds clear to auscultation               Abdominal  GI exam deferred       Other Findings            Anesthetic Plan    ASA: 3  Anesthesia type: general and total IV anesthesia          Induction: Intravenous  Anesthetic plan and risks discussed with: Patient

## 2018-05-31 NOTE — ROUTINE PROCESS
Nena Ley  1970  [id number]    Situation:  Verbal report received from: Re Quiroz RN  Procedure: Procedure(s):  COLONOSCOPY  COLON BIOPSY    Background:    Preoperative diagnosis: DIARRHEA  Postoperative diagnosis: normal colon exam    :  Dr. Delma Rosales  Assistant(s): Endoscopy Technician-1: eNha Garcia  Endoscopy RN-1: Cliff Crockett    Specimens:   ID Type Source Tests Collected by Time Destination   1 : bx Preservative   Demond Schwab MD 5/31/2018 2264 Pathology     H. Pylori  no    Assessment:  Intra-procedure medications       Anesthesia gave intra-procedure sedation and medications, see anesthesia flow sheet     Intravenous fluids: NS@ KVO     Vital signs stable     Abdominal assessment: round and soft     Recommendation:  Discharge patient per MD order.   Return   Family or Friend   Permission to share finding with family or friend

## 2018-06-19 ENCOUNTER — HOSPITAL ENCOUNTER (OUTPATIENT)
Dept: CT IMAGING | Age: 48
Discharge: HOME | End: 2018-06-19
Attending: INTERNAL MEDICINE
Payer: MEDICARE

## 2018-06-19 DIAGNOSIS — R93.89 ABNORMAL CHEST CT: ICD-10-CM

## 2018-06-19 PROCEDURE — 71250 CT THORAX DX C-: CPT

## 2019-02-05 ENCOUNTER — HOSPITAL ENCOUNTER (OUTPATIENT)
Dept: ULTRASOUND IMAGING | Age: 49
Discharge: HOME | End: 2019-02-05
Attending: OTOLARYNGOLOGY
Payer: MEDICARE

## 2019-02-05 DIAGNOSIS — R59.1 LYMPHADENOPATHY: ICD-10-CM

## 2019-02-05 PROCEDURE — 76536 US EXAM OF HEAD AND NECK: CPT

## 2019-09-30 ENCOUNTER — DOCUMENTATION ONLY (OUTPATIENT)
Dept: SLEEP MEDICINE | Age: 49
End: 2019-09-30

## 2019-09-30 ENCOUNTER — OFFICE VISIT (OUTPATIENT)
Dept: SLEEP MEDICINE | Age: 49
End: 2019-09-30

## 2019-09-30 VITALS
RESPIRATION RATE: 19 BRPM | HEIGHT: 65 IN | DIASTOLIC BLOOD PRESSURE: 66 MMHG | SYSTOLIC BLOOD PRESSURE: 110 MMHG | WEIGHT: 202 LBS | BODY MASS INDEX: 33.66 KG/M2 | HEART RATE: 74 BPM | OXYGEN SATURATION: 97 %

## 2019-09-30 DIAGNOSIS — E66.9 OBESITY (BMI 30-39.9): ICD-10-CM

## 2019-09-30 DIAGNOSIS — G47.33 OBSTRUCTIVE SLEEP APNEA SYNDROME: Primary | ICD-10-CM

## 2019-09-30 NOTE — PATIENT INSTRUCTIONS
[address] [facility] [place]. [place], [address]  Tel.  [phone]  Fax. 100 [facility] 60  [address] [facility] [address], [address]  Tel.  [phone]  Fax. [phone] [address]  Tel.  [phone]  Fax. [phone]     PROPER SLEEP HYGIENE    What to avoid  · Do not have drinks with caffeine, such as coffee or black tea, for 8 hours before bed. · Do not smoke or use other types of tobacco near bedtime. Nicotine is a stimulant and can keep you awake. · Avoid drinking alcohol late in the evening, because it can cause you to wake in the middle of the night. · Do not eat a big meal close to bedtime. If you are hungry, eat a light snack. · Do not drink a lot of water close to bedtime, because the need to urinate may wake you up during the night. · Do not read or watch TV in bed. Use the bed only for sleeping and sexual activity. What to try  · Go to bed at the same time every night, and wake up at the same time every morning. Do not take naps during the day. · Keep your bedroom quiet, dark, and cool. · Get regular exercise, but not within 3 to 4 hours of your bedtime. .  · Sleep on a comfortable pillow and mattress. · If watching the clock makes you anxious, turn it facing away from you so you cannot see the time. · If you worry when you lie down, start a worry book. Well before bedtime, write down your worries, and then set the book and your concerns aside. · Try meditation or other relaxation techniques before you go to bed. · If you cannot fall asleep, get up and go to another room until you feel sleepy. Do something relaxing. Repeat your bedtime routine before you go to bed again. · Make your house quiet and calm about an hour before bedtime. Turn down the lights, turn off the TV, log off the computer, and turn down the volume on music. This can help you relax after a busy day.     Drowsy Driving  The [address] [place] [address] Road Traffic Safety Administration cites drowsiness as a causing factor in more than 548,466 police reported crashes annually, resulting in 76,000 injuries and 1,500 deaths. Other surveys suggest 55% of people polled have driven while drowsy in the past year, 23% had fallen asleep but not crashed, 3% crashed, and 2% had and accident due to drowsy driving. Who is at risk? Young Drivers: One study of drowsy driving accidents states that 55% of the drivers were under 25 years. Of those, 75% were male. Shift Workers and Travelers: People who work overnight or travel across time zones frequently are at higher risk of experiencing Circadian Rhythm Disorders. They are trying to work and function when their body is programed to sleep. Sleep Deprived: Lack of sleep has a serious impact on your ability to pay attention or focus on a task. Consistently getting less than the average of 8 hours your body needs creates partial or cumulative sleep deprivation. Untreated Sleep Disorders: Sleep Apnea, Narcolepsy, R.L.S., and other sleep disorders (untreated) prevent a person from getting enough restful sleep. This leads to excessive daytime sleepiness and increases the risk for drowsy driving accidents by up to 7 times. Medications / Alcohol: Even over the counter medications can cause drowsiness. Medications that impair a drivers attention should have a warning label. Alcohol naturally makes you sleepy and on its own can cause accidents. Combined with excessive drowsiness its effects are amplified. Signs of Drowsy Driving:   * You don't remember driving the last few miles   * You may drift out of your sameera   * You are unable to focus and your thoughts wander   * You may yawn more often than normal   * You have difficulty keeping your eyes open / nodding off   * Missing traffic signs, speeding, or tailgating  Prevention-   Good sleep hygiene, lifestyle and behavioral choices have the most impact on drowsy driving.  There is no substitute for sleep and the average person requires 8 hours nightly. If you find yourself driving drowsy, stop and sleep. Consider the sleep hygiene tips provided during your visit as well. Medication Refill Policy: Refills for all medications require 1 week advance notice. Please have your pharmacy fax a refill request. We are unable to fax, or call in \"controled substance\" medications and you will need to pick these prescriptions up from our office. Karma Activation    Thank you for requesting access to Karma. Please follow the instructions below to securely access and download your online medical record. Karma allows you to send messages to your doctor, view your test results, renew your prescriptions, schedule appointments, and more. How Do I Sign Up? 1. In your internet browser, go to [email or web address]. 2. Click on the First Time User? Click Here link in the Sign In box. You will see the New Member Sign Up page. 3. Enter your Karma Access Code exactly as it appears below. You will not need to use this code after youve completed the sign-up process. If you do not sign up before the expiration date, you must request a new code. Karma Access Code: [email or web address]  Expires: 2019  2:28 PM (This is the date your Karma access code will )    4. Enter the last four digits of your Social Security Number (xxxx) and Date of Birth (mm/dd/yyyy) as indicated and click Submit. You will be taken to the next sign-up page. 5. Create a Karma ID. This will be your Karma login ID and cannot be changed, so think of one that is secure and easy to remember. 6. Create a Karma password. You can change your password at any time. 7. Enter your Password Reset Question and Answer. This can be used at a later time if you forget your password. 8. Enter your e-mail address. You will receive e-mail notification when new information is available in [address] 9. Click Sign Up.  You can now view and download portions of your medical record. 10. Click the Download Summary menu link to download a portable copy of your medical information. Additional Information    If you have questions, please call [phone]. Remember, [employer] is NOT to be used for urgent needs. For medical emergencies, dial 911.

## 2019-09-30 NOTE — PROGRESS NOTES
217 [facility]. [place], [address]  Tel.  [phone]  Fax. 100 [facility] [address]  [address], [address] [facility]  Tel.  [phone]  Fax. [phone] [address] [facility] Angel Reyes  Tel.  [phone]  Fax. [phone]     S>Rogelio Roche is a 52 y.o. male seen for a positive airway pressure follow-up. He reports no problems using the device. The following problems are identified:    Drowsiness no Problems exhaling no   Snoring no Forget to put on yes   Mask Comfortable yes Can't fall asleep no   Dry Mouth no Mask falls off no   Air Leaking yes Frequent awakenings no     Download reviewed. He admits that his sleep has improved. Therapy Apnea Index averaged over PAP use: 6 /hr which reflects improved sleep breathing condition. No Known Allergies    He has a current medication list which includes the following prescription(s): famotidine, atorvastatin, ff-vu-hg-fe-min-lycopen-lutein, vit b cplxc 11-calcium-dha-q10, calcium, ondansetron hcl, and azelastine. .      He  has a past medical history of Arthritis, Down's syndrome, DVT (deep venous thrombosis) ([facility] [address] (7/17/2014), Gastrointestinal disorder, Other ill-defined conditions(799.89), Other ill-defined conditions(799.89), Other ill-defined conditions(799.89), Other ill-defined conditions(799.89), Sleep apnea, and Thromboembolus ([facility] [address] [place] Sleepiness Score: 12   and Modified F.O.S.Q. Score Total / 2: 20   which reflect improved sleep quality over therapy time. O>    Visit Vitals  /66 (BP 1 Location: Left arm, BP Patient Position: Sitting)   Pulse 74   Resp 19   Ht 5' 5\" (1.651 m)   Wt 202 lb (91.6 kg)   SpO2 97%   BMI 33.61 kg/m²           General:   Alert, oriented, not in distress   Neck:   No JVD    Chest/Lungs:  symetrical lung expansion , no accessory muscle use    Extremities:  no obvious rashes , negative edema    Neuro:  No focal deficits ;  No obvious tremor    Psych:  Normal affect ,  Normal countenance ;         A>    ICD-10-CM ICD-9-CM    1. Obstructive sleep apnea syndrome G47.33 327.23 AMB SUPPLY ORDER   2. Obesity (BMI 30-39. 9) E66.9 278.00      AHI = 75(8-14). On CPAP :  11-13 cmH2O. Compliant:      yes    Therapeutic Response:  Positive    P>      *   Follow-up and Dispositions    · Return in about 1 year (around 9/30/2020). he is compliant with PAP therapy and PAP continues to benefit patient and remains necessary for control of his sleep apnea. he will continue on his current pressure settings. I have ordered replacement supplies    * He was asked to contact our office for any problems regarding PAP therapy. * Counseling was provided regarding the importance of regular PAP use and on proper sleep hygiene and safe driving. * Re-enforced proper and regular cleaning for the device. 2. Obesity - I have counseled the patient regarding the benefits of weight loss. He will continue his exercise regimen. 3. Hypertension - he continues on his current regimen. I have reviewed the relationship between hypertension as it relates to sleep-disordered breathing.        Electronically signed by    Jc Arechiga MD  Diplomate in Sleep Medicine  [facility]

## 2019-12-02 ENCOUNTER — OFFICE VISIT (OUTPATIENT)
Dept: URGENT CARE | Age: 49
End: 2019-12-02

## 2019-12-02 VITALS
SYSTOLIC BLOOD PRESSURE: 122 MMHG | TEMPERATURE: 99.4 F | WEIGHT: 197 LBS | HEART RATE: 94 BPM | DIASTOLIC BLOOD PRESSURE: 57 MMHG | BODY MASS INDEX: 32.82 KG/M2 | HEIGHT: 65 IN | OXYGEN SATURATION: 97 % | RESPIRATION RATE: 18 BRPM

## 2019-12-02 DIAGNOSIS — J09.X2 INFLUENZA A (H5N1): Primary | ICD-10-CM

## 2019-12-02 DIAGNOSIS — R50.9 FEVER, UNSPECIFIED FEVER CAUSE: ICD-10-CM

## 2019-12-02 LAB
FLUAV+FLUBV AG NOSE QL IA.RAPID: NEGATIVE POS/NEG
FLUAV+FLUBV AG NOSE QL IA.RAPID: POSITIVE POS/NEG
VALID INTERNAL CONTROL?: YES

## 2019-12-02 NOTE — PATIENT INSTRUCTIONS
Go to emergency department immediately for new, worsening or changes Influenza (Flu): Care Instructions Your Care Instructions Influenza (flu) is an infection in the lungs and breathing passages. It is caused by the influenza virus. There are different strains, or types, of the flu virus from year to year. Unlike the common cold, the flu comes on suddenly and the symptoms, such as a cough, congestion, fever, chills, fatigue, aches, and pains, are more severe. These symptoms may last up to 10 days. Although the flu can make you feel very sick, it usually doesn't cause serious health problems. Home treatment is usually all you need for flu symptoms. But your doctor may prescribe antiviral medicine to prevent other health problems, such as pneumonia, from developing. Older people and those who have a long-term health condition, such as lung disease, are most at risk for having pneumonia or other health problems. Follow-up care is a key part of your treatment and safety. Be sure to make and go to all appointments, and call your doctor if you are having problems. It's also a good idea to know your test results and keep a list of the medicines you take. How can you care for yourself at home? · Get plenty of rest. 
· Drink plenty of fluids, enough so that your urine is light yellow or clear like water. If you have kidney, heart, or liver disease and have to limit fluids, talk with your doctor before you increase the amount of fluids you drink. · Take an over-the-counter pain medicine if needed, such as acetaminophen (Tylenol), ibuprofen (Advil, Motrin), or naproxen (Aleve), to relieve fever, headache, and muscle aches. Read and follow all instructions on the label. No one younger than 20 should take aspirin. It has been linked to Reye syndrome, a serious illness. · Do not smoke. Smoking can make the flu worse.  If you need help quitting, talk to your doctor about stop-smoking programs and medicines. These can increase your chances of quitting for good. · Breathe moist air from a hot shower or from a sink filled with hot water to help clear a stuffy nose. · Before you use cough and cold medicines, check the label. These medicines may not be safe for young children or for people with certain health problems. · If the skin around your nose and lips becomes sore, put some petroleum jelly on the area. · To ease coughing: ? Drink fluids to soothe a scratchy throat. ? Suck on cough drops or plain hard candy. ? Take an over-the-counter cough medicine that contains dextromethorphan to help you get some sleep. Read and follow all instructions on the label. ? Raise your head at night with an extra pillow. This may help you rest if coughing keeps you awake. · Take any prescribed medicine exactly as directed. Call your doctor if you think you are having a problem with your medicine. To avoid spreading the flu · Wash your hands regularly, and keep your hands away from your face. · Stay home from school, work, and other public places until you are feeling better and your fever has been gone for at least 24 hours. The fever needs to have gone away on its own without the help of medicine. · Ask people living with you to talk to their doctors about preventing the flu. They may get antiviral medicine to keep from getting the flu from you. · To prevent the flu in the future, get a flu vaccine every fall. Encourage people living with you to get the vaccine. · Cover your mouth when you cough or sneeze. When should you call for help? Call 911 anytime you think you may need emergency care. For example, call if: 
  · You have severe trouble breathing.  
 Call your doctor now or seek immediate medical care if: 
  · You have new or worse trouble breathing.  
  · You seem to be getting much sicker.  
  · You feel very sleepy or confused.   · You have a new or higher fever.  
  · You get a new rash.  
 Watch closely for changes in your health, and be sure to contact your doctor if: 
  · You begin to get better and then get worse.  
  · You are not getting better after 1 week. Where can you learn more? Go to http://kyara-quinton.info/. Enter V046 in the search box to learn more about \"Influenza (Flu): Care Instructions. \" Current as of: June 9, 2019 Content Version: 12.2 © 5750-5123 [employer]. Care instructions adapted under license by [employer] (which disclaims liability or warranty for this information). If you have questions about a medical condition or this instruction, always ask your healthcare professional. [address] any warranty or liability for your use of this information.

## 2019-12-03 NOTE — PROGRESS NOTES
53 yo male with hx of DVT and downs syndrome here for flu like symptoms: fevers, chills, myalgias, dry cough, nasal congestion, runny nose. Symptom onset 3 days ago without any preceding illness. Has tried no medications. No aggravating or alleviating factors. Symptoms are constant and overall worsening. Promotes no decrease in PO intake of fluids. Denies: severe lethargy, SOB, syncope/near syncope, vomiting/diarrhea, chest pain, chest pain with breathing, labored breathing, severe headache, non-intractable fevers . Sick contacts- family member he is with tested positive for flu today.            Past Medical History:   Diagnosis Date    Arthritis     scoliosis    Down's syndrome     [facility]    Gastrointestinal disorder     GERD, Robin's Esophagus    Other ill-defined conditions(799.89)     Down Syndrom with mild mental retardation    Other ill-defined conditions(799.89)     mixed hyperlipidemia/hypercholesteremia    Other ill-defined conditions(799.89)     myopia    Other ill-defined conditions(799.89)     sleep apnea, uses cpap at night    Sleep apnea     uses cpap    Thromboembolus [facility])         Past Surgical History:   Procedure Laterality Date    COLONOSCOPY N/A 5/31/2018    COLONOSCOPY performed by Saida Mcarthur MD at [facility] ENDOSCOPY    HX CHOLECYSTECTOMY      HX OTHER SURGICAL      convergent strabismus s/p corrective surgery         Family History   Problem Relation Age of Onset    Hypertension Mother     Diabetes Mother     Asthma Father     Diabetes Father     Asthma Sister     Bleeding Prob Maternal Grandfather     Arthritis-osteo Paternal Grandmother     Alcohol abuse Paternal Grandmother     Lung Disease Paternal Grandmother     Hypertension Paternal Grandmother     Alcohol abuse Paternal Grandfather     Heart Disease Paternal Grandfather         Social History     Socioeconomic History    Marital status: SINGLE     Spouse name: Not on file    Number of children: Not on file    Years of education: Not on file    Highest education level: Not on file   Occupational History    Not on file   Social Needs    Financial resource strain: Not on file    Food insecurity:     Worry: Not on file     Inability: Not on file    Transportation needs:     Medical: Not on file     Non-medical: Not on file   Tobacco Use    Smoking status: Never Smoker    Smokeless tobacco: Never Used   Substance and Sexual Activity    Alcohol use: No     Alcohol/week: 0.0 standard drinks    Drug use: No    Sexual activity: Not on file   Lifestyle    Physical activity:     Days per week: Not on file     Minutes per session: Not on file    Stress: Not on file   Relationships    Social connections:     Talks on phone: Not on file     Gets together: Not on file     Attends [employer] service: Not on file     Active member of club or organization: Not on file     Attends meetings of clubs or organizations: Not on file     Relationship status: Not on file    Intimate partner violence:     Fear of current or ex partner: Not on file     Emotionally abused: Not on file     Physically abused: Not on file     Forced sexual activity: Not on file   Other Topics Concern    Not on file   Social History Narrative    Not on file                ALLERGIES: Patient has no known allergies. Review of Systems   Gastrointestinal: Negative for nausea and vomiting. Skin: Negative for rash. All other systems reviewed and are negative. Vitals:    12/02/19 1545 12/02/19 1551   BP:  122/57   Pulse:  94   Resp:  18   Temp:  99.4 °F (37.4 °C)   SpO2:  97%   Weight:  197 lb (89.4 kg)   Height: 5' 5\" (1.651 m)        Physical Exam  Vitals signs and nursing note reviewed. Constitutional:       General: He is not in acute distress. Appearance: He is not toxic-appearing or diaphoretic.       Comments: Non-toxic appearing, well hydrated   HENT:      Head:      Comments:   TMs normal appearing bilat  Erythematous nasal turbinates with clear rhinorrhea and nasal sniffling  OP mild erythema without swelling or exudate. Uvula midline. No oral lesions. Right Ear: Tympanic membrane normal.      Left Ear: Tympanic membrane normal.      Mouth/Throat:      Mouth: Mucous membranes are moist.      Pharynx: No oropharyngeal exudate. Eyes:      General: No scleral icterus. Pupils: Pupils are equal, round, and reactive to light. Neck:      Musculoskeletal: Neck supple. Cardiovascular:      Rate and Rhythm: Normal rate and regular rhythm. Heart sounds: Normal heart sounds. No murmur. No friction rub. No gallop. Pulmonary:      Effort: Pulmonary effort is normal. No respiratory distress. Breath sounds: Normal breath sounds. No stridor. No wheezing or rhonchi. Skin:     General: Skin is warm and dry. Capillary Refill: Capillary refill takes less than 2 seconds. Coloration: Skin is not pale. Findings: No erythema or rash. Neurological:      Mental Status: He is alert and oriented to person, place, and time. Cranial Nerves: No cranial nerve deficit. Psychiatric:         Mood and Affect: Mood normal.         Behavior: Behavior normal.         Thought Content: Thought content normal.         Judgment: Judgment normal.         MDM     Differential Diagnosis; Clinical Impression; Plan:       CLINICAL IMPRESSION:  (J09.X2) Influenza A (H5N1)  (primary encounter diagnosis)  (R50.9) Fever, unspecified fever cause    Plan:  Rapid test positive for: flu A  Outside of window for tamiflu treatment. No evidence of complication or severe illness today. Supportive therapy and monitoring of symptoms:  Tylenol (acetaminophen) for fever and or general discomforts. Do not combine with other OTC medications which contain Tylenol (acetaminophen)   Maintain adequate fluid intake to avoid dehydration.   Throat lozenges or warm fluids (tea/water) with honey  Humidified air at night may provide some relief. Letter written for work/school. We have reviewed concerning signs/symptoms, normal vs abnormal progression of medical condition and when to seek immediate medical attention. ED or Urgent Care immediately for worsening or new symptoms. You should see your PCP for updates on your routine health maintenance.              Procedures           Results for orders placed or performed in visit on 12/02/19   AMB POC DAVID INFLUENZA A/B TEST   Result Value Ref Range    VALID INTERNAL CONTROL POC Yes     Influenza A Ag POC Positive Negative Pos/Neg    Influenza B Ag POC Negative Negative Pos/Neg

## 2020-04-04 PROBLEM — E78.2 MIXED HYPERLIPIDEMIA: Status: ACTIVE | Noted: 2020-04-04

## 2020-04-04 PROBLEM — K21.9 GASTROESOPHAGEAL REFLUX DISEASE WITHOUT ESOPHAGITIS: Status: ACTIVE | Noted: 2020-04-04

## 2020-04-04 NOTE — PROGRESS NOTES
HISTORY OF PRESENT ILLNESS  Paloma Gomes is a 48 y.o. male. HPI     This is an NPvisit completed with telemedicine was completed with video assist  the patient acknowledges and agrees to this method of visitation  preet    Spent 45min over half counseling    Here to establish    Does not monitor bp    Not on bp meds    Needs to work on portions and reducing carbs. He takes metformin for DM  500mg daily   No home readings currently   Has trouble getting his meter to work     the patient has a h/o hlp   On lipitor    On pepcid for gerd--works well     Takes mvi  Take vit b12  Takes allegra for allergies    Takes calcium  Takes vitamin d 2000units         Has constantino--he weasrs cpap each night     9/19 saw isidra--    S>Rogelio Hart is a 52 y.o. male seen for a positive airway pressure follow-up. He reports no problems using the device. The following problems are identified:     Drowsiness no Problems exhaling no   Snoring no Forget to put on yes   Mask Comfortable yes Can't fall asleep no   Dry Mouth no Mask falls off no   Air Leaking yes Frequent awakenings no      Download reviewed.     He admits that his sleep has improved. Therapy Apnea Index averaged over PAP use: 6 /hr which reflects improved sleep breathing condition. Reviewed colo  Reviewed ct chest 6/18 [facility] pulm ordered  IMPRESSION  IMPRESSION: Increase in size of nonspecific lingular nodular density with  surrounding micronodules which neoplasm is not excluded. Further evaluation with  image guided needle biopsy and/or PET CT can be considered. Reports this was never evaluated and he needs to f/u with bubba  --he will call to make f/u appointment  leonel for cough? ?    Reviewed notes from [facility] from 5/31/19  Discussed cough , occurring for months, tx with allegra and clemastine, discussed CT with nodule , ordered repeat ct, discussed neck mass is lipoma no further f/u needed.  bp 118/69 reports h/o PE w coumadin stopped in 3/18, discussed he had several swallow studies. Discussed 6 month f/u       Has a chronic cough, thought maybe gerd or reflux,   took gaviscon in the past  Has been using gaviscon which has helped    Us neck 2/19--lipoma    Got petrizzi notes  Pt has h/o constantino, spastic esophagus and chronic cough     Has bunions, not operated yet  Sees gretchen    Has a h/o rosacea  On metrogel for this    Takes asa at night--has h/o isolated dvt    pmh  Downs  hlp  gerd    psh  Had julisa      fhx  Dad parkinson  Dm, htn, hlp, dementia  Mother--dm, cirrhosis    Shx:   2 sisters  No tobacco or etoh  Has downs syncdrome  Cared for by parents  Worked as  [employer]  Currently goes to [address] [place], [place] [address]way:  Colonoscopy: 5/18 sara 10 yrs, has had egd as well   PSA: ordered  AAA screen not needed  Tdap: 10/19  Pneumovax not yet--will get at f/u   Prev 13 not yet needed  shingrix not yet needed  Flu shot 10/19  Ophtho: summer 2019 [address] [facility] [address]  a1c 12/19 8.2  Lipids 12/19 ldl 93  Micro  12/19      Patient Active Problem List   Diagnosis Code    Down's syndrome Q90.9    [facility] I82.409    Gastroesophageal reflux disease without esophagitis K21.9    Mixed hyperlipidemia E78.2    Type 2 diabetes mellitus without complication, without long-term current use of insulin (HCC) E11.9     Current Outpatient Medications   Medication Sig Dispense Refill    metFORMIN (GLUCOPHAGE) 500 mg tablet Take 500 mg by mouth daily (with breakfast).  ondansetron hcl (ZOFRAN, AS HYDROCHLORIDE,) 4 mg tablet Take 1 Tab by mouth every eight (8) hours as needed for Nausea. 20 Tab 0    famotidine (PEPCID) 20 mg tablet Take 1 Tab by mouth two (2) times a day. 20 Tab 0    atorvastatin (LIPITOR) 40 mg tablet Take 40 mg by mouth nightly.  OJ-OP-XO-Fe-Min-Lycopen-Lutein (CENTRUM) 0.4-162-18 mg Tab Take 1 Tab by mouth daily.       vit B cmplx & C #11-ca-dha-Q10 (BRAIN MIGHT-DHA-CO Q10) 140-10-10 mg Tab Take 1 Tab by mouth daily.  calcium 600 mg cap Take 1 Tab by mouth daily.  Azelastine (ASTEPRO) 0.15 % (205.5 mcg) nasal spray 2 Sprays by Both Nostrils route as needed. No results found for: CHOL, CHOLPOCT, HDL, LDL, LDLC, LDLCPOC, LDLCEXT, TRIGL, TGLPOCT, CHHD, CHHDX  Lab Results   Component Value Date/Time    GFR est non-AA >60 05/22/2014 01:45 PM    GFR est AA >60 05/22/2014 01:45 PM    Creatinine 0.88 05/22/2014 01:45 PM    BUN 15 05/22/2014 01:45 PM    Sodium 141 05/22/2014 01:45 PM    Potassium 4.2 05/22/2014 01:45 PM    Chloride 104 05/22/2014 01:45 PM    CO2 30 05/22/2014 01:45 PM    Magnesium 2.0 05/22/2014 01:45 PM     No results found for: TSH, TSH2, TSH3, TSHP, TSHELE, TSHEXT, TT3, T3U, T3UP, FRT3, FT3, FT4, FT4P, T4, T4P, FT4T, TT7, TSHEXT      Review of Systems   Constitutional: Negative for chills and fever. HENT: Negative for hearing loss and tinnitus. Eyes: Negative for blurred vision and double vision. Respiratory: Negative for shortness of breath and wheezing. Cardiovascular: Negative for chest pain and palpitations. Gastrointestinal: Negative for nausea and vomiting. Genitourinary: Negative for dysuria and frequency. Musculoskeletal: Negative for back pain and falls. Skin: Negative for itching and rash. Neurological: Negative for dizziness, loss of consciousness and headaches. Psychiatric/Behavioral: Negative for depression. The patient is not nervous/anxious. Physical Exam  Constitutional:       General: He is not in acute distress. Appearance: Normal appearance. He is not ill-appearing, toxic-appearing or diaphoretic. HENT:      Head: Normocephalic and atraumatic. Eyes:      General:         Right eye: No discharge. Left eye: No discharge. Conjunctiva/sclera: Conjunctivae normal.   Neurological:      General: No focal deficit present. Mental Status: He is alert and oriented to person, place, and time.    Psychiatric: Mood and Affect: Mood normal.         Behavior: Behavior normal.         ASSESSMENT and PLAN    ICD-10-CM ICD-9-CM    1. Gastroesophageal reflux disease without esophagitis    Controlled on pepcid K21.9 530.81    2. Mixed hyperlipidemia--controlled on lipitor E78.2 272.2    3. Obesity (BMI 30.0-34.9)--work on weight loss    Portions  E66.9 278.00    4. Deep vein thrombosis (DVT) of proximal lower extremity, unspecified chronicity, unspecified laterality (HCC)    This was in the past  One time event  Now on asa daily to prevent recurrent clots. I82.4Y9 453.41    5. Down's syndrome--lives with his parents Q90.9 758.0    10. Medicare annual wellness visit, initial Z00.00 V70.0    7. Type 2 diabetes mellitus without complication, without long-term current use of insulin (HCC)    Last a1c reported was quite elevated  Now on metformin 500mg only once per day     Will increase to bid dosing  Pt reports elevated a1c    Will  E11.9 250.00      8 pulm nodule--f/u schenkein  9 constantino--compliant cpap   10 cough --on clemestine , needs to f/u with schenkein, reports gi and pulm eval, also takes gaviscon which helps   11 rosacea --on metrogel       This is an Initial Medicare Annual Wellness Exam (AWV) (Performed 12 months after IPPE or effective date of Medicare Part B enrollment, Once in a lifetime)    Consent: Yumi Flower, who was seen by synchronous (real-time) audio-video technology, and/or his healthcare decision maker, is aware that this patient-initiated, Telehealth encounter on 4/6/2020 is a billable service. While AWVs are fully covered by Medicare, any services rendered on this date that are not included in an AWV are subject to additional billing, with coverage as determined by his insurance carrier. He is aware that he may receive a bill for any such additional services and has provided verbal consent to proceed: Yes.     I have reviewed the patient's medical history in detail and updated the computerized patient record. History     Patient Active Problem List   Diagnosis Code    Down's syndrome Q90.9    DVT [facility]deep venous thrombosis) ([facility] I82.409    Gastroesophageal reflux disease without esophagitis K21.9    Mixed hyperlipidemia E78.2     Past Medical History:   Diagnosis Date    Arthritis     scoliosis    Down's syndrome     DVT (deep venous thrombosis) ([facility] 7/17/2014    Gastrointestinal disorder     GERD, Robin's Esophagus    Other ill-defined conditions(799.89)     Down Syndrom with mild mental retardation    Other ill-defined conditions(799.89)     mixed hyperlipidemia/hypercholesteremia    Other ill-defined conditions(799.89)     myopia    Other ill-defined conditions(799.89)     sleep apnea, uses cpap at night    Sleep apnea     uses cpap    Thromboembolus [facility])       Past Surgical History:   Procedure Laterality Date    COLONOSCOPY N/A 5/31/2018    COLON[facility]COPY [facility]OSCOPY    HX CHOLECYSTECTOMY      HX OTHER SURGICAL      convergent strabismus s/p corrective surgery     Current Outpatient Medications   Medication Sig Dispense Refill    ondansetron hcl (ZOFRAN, AS HYDROCHLORIDE,) 4 mg tablet Take 1 Tab by mouth every eight (8) hours as needed for Nausea. 20 Tab 0    famotidine (PEPCID) 20 mg tablet Take 1 Tab by mouth two (2) times a day. 20 Tab 0    atorvastatin (LIPITOR) 40 mg tablet Take 40 mg by mouth nightly.  HF-QL-ID-Fe-Min-Lycopen-Lutein (CENTRUM) 0.4-162-18 mg Tab Take 1 Tab by mouth daily.  vit B cmplx & C #11-ca-dha-Q10 (BRAIN MIGHT-DHA-CO Q10) 140-10-10 mg Tab Take 1 Tab by mouth daily.  calcium 600 mg cap Take 1 Tab by mouth daily.  Azelastine (ASTEPRO) 0.15 % (205.5 mcg) nasal spray 2 Sprays by Both Nostrils route as needed.        No Known Allergies    Family History   Problem Relation Age of Onset    Hypertension Mother     Diabetes Mother     Asthma Father     Diabetes Father     Asthma Sister     Bleeding Prob Maternal Grandfather     Arthritis-osteo Paternal Grandmother     Alcohol abuse Paternal Grandmother     Lung Disease Paternal Grandmother     Hypertension Paternal Grandmother     Alcohol abuse Paternal Grandfather     Heart Disease Paternal Grandfather      Social History     Tobacco Use    Smoking status: Never Smoker    Smokeless tobacco: Never Used   Substance Use Topics    Alcohol use: No     Alcohol/week: 0.0 standard drinks       Depression Risk Factor Screening:     3 most recent PHQ Screens 4/6/2020   Little interest or pleasure in doing things Not at all   Feeling down, depressed, irritable, or hopeless Not at all   Total Score PHQ 2 0       Alcohol Risk Factor Screening (MALE < 65): Do you average more than 2 drinks per night or 14 drinks a week: No    On any one occasion in the past three months have you have had more than 4 drinks containing alcohol:  No      Functional Ability and Level of Safety:   Hearing: Hearing is good. Activities of Daily Living: The home contains: handrails and grab bars  Patient needs help with:  transportation, shopping, preparing meals, laundry, housework, managing medications and managing money     Ambulation: with no difficulty    Fall Risk:  Fall Risk Assessment, last 12 mths 4/6/2020   Able to walk? Yes   Fall in past 12 months?  No       Abuse Screen:  Patient is not abused  Lives with parents  Cognitive Screening   Has your family/caregiver stated any concerns about your memory: yes - developmental delays  Cognitive Screening: susan pat    Patient Care Team   Patient Care Team:  Tressa Alarcon MD as PCP - General (Internal Medicine)  Dontrell Gonzalez MD as Physician (Sleep Medicine)  Jacquie Arechiga MD (Pulmonary Disease)  Khai Morales OD (Optometry)  Vero Faustin DPM (Podiatry)     updated    Assessment/Plan   Education and counseling provided:  Are appropriate based on today's review and evaluation  End-of-Life planning (with patient's consent)  Influenza Vaccine  Screening for glaucoma  Diabetes screening test    Diagnoses and all orders for this visit:    1. Gastroesophageal reflux disease without esophagitis    2. Mixed hyperlipidemia    3. Obesity (BMI 30.0-34.9)    4. Deep vein thrombosis (DVT) of proximal lower extremity, unspecified chronicity, unspecified laterality ([facility]    5. Down's syndrome    6. Medicare annual wellness visit, initial         Health Maintenance Due   Topic Date Due    Lipid Screen  02/23/1980    DTaP/Tdap/Td series (1 - Tdap) 02/23/1991    Medicare Yearly Exam  10/29/2018    Shingrix Vaccine Age 50> (1 of 2) 02/23/2020    FOBT Q1Y Age 54-65  02/23/2020     ACP planning begun today, SDM is his parents, patient has downs syndrome        Colonoscopy: 5/18 sara 10 yrs  PSA: ordered  AAA screen not needed    Tdap: 12/19  Pneumovax not yet--will get at f/u   Prev 13 not yet needed  shingrix not yet needed  Flu shot 10/19    Ophtho: summer 2019 [address] annual     a1c 12/19 8.2 per pt report due ordered  Lipids 12/19 ldl 93 annual   Micro  12/19 annual                 Nithin Adams is a 48 y.o. male being evaluated by a video visit encounter for concerns as above. A caregiver was present when appropriate. Due to this being a TeleHealth encounter (During QCLDX-07 public health emergency), evaluation of the following organ systems was limited: Vitals/Constitutional/EENT/Resp/CV/GI//MS/Neuro/Skin/Heme-Lymph-Imm. Pursuant to the emergency declaration under the [address] [place] waiver authority and the [employer] Act, this Virtual  Visit was conducted, with patient's (and/or legal guardian's) consent, to reduce the patient's risk of exposure to COVID-19 and provide necessary medical care. Services were provided through a video synchronous discussion virtually to substitute for in-person clinic visit. Patient and provider were located at their individual homes.     Tarah Murphy MD

## 2020-04-06 ENCOUNTER — VIRTUAL VISIT (OUTPATIENT)
Dept: INTERNAL MEDICINE CLINIC | Age: 50
End: 2020-04-06

## 2020-04-06 DIAGNOSIS — R05.9 COUGH: ICD-10-CM

## 2020-04-06 DIAGNOSIS — R91.1 LUNG NODULE: ICD-10-CM

## 2020-04-06 DIAGNOSIS — K21.9 GASTROESOPHAGEAL REFLUX DISEASE WITHOUT ESOPHAGITIS: Primary | ICD-10-CM

## 2020-04-06 DIAGNOSIS — Q90.9 DOWN'S SYNDROME: ICD-10-CM

## 2020-04-06 DIAGNOSIS — E66.9 OBESITY (BMI 30.0-34.9): ICD-10-CM

## 2020-04-06 DIAGNOSIS — E11.9 TYPE 2 DIABETES MELLITUS WITHOUT COMPLICATION, WITHOUT LONG-TERM CURRENT USE OF INSULIN (HCC): ICD-10-CM

## 2020-04-06 DIAGNOSIS — Z00.00 MEDICARE ANNUAL WELLNESS VISIT, INITIAL: ICD-10-CM

## 2020-04-06 DIAGNOSIS — E78.2 MIXED HYPERLIPIDEMIA: ICD-10-CM

## 2020-04-06 DIAGNOSIS — G47.33 OSA ON CPAP: ICD-10-CM

## 2020-04-06 DIAGNOSIS — Z99.89 OSA ON CPAP: ICD-10-CM

## 2020-04-06 DIAGNOSIS — I82.4Y9 DEEP VEIN THROMBOSIS (DVT) OF PROXIMAL LOWER EXTREMITY, UNSPECIFIED CHRONICITY, UNSPECIFIED LATERALITY (HCC): ICD-10-CM

## 2020-04-06 RX ORDER — METFORMIN HYDROCHLORIDE 500 MG/1
500 TABLET ORAL 2 TIMES DAILY WITH MEALS
Qty: 180 TAB | Refills: 0 | Status: SHIPPED | OUTPATIENT
Start: 2020-04-06 | End: 2020-04-30

## 2020-04-06 RX ORDER — METFORMIN HYDROCHLORIDE 500 MG/1
500 TABLET ORAL
COMMUNITY
End: 2020-04-06 | Stop reason: SDUPTHER

## 2020-04-06 NOTE — PATIENT INSTRUCTIONS
Medicare Wellness Visit, Male The best way to live healthy is to have a lifestyle where you eat a well-balanced diet, exercise regularly, limit alcohol use, and quit all forms of tobacco/nicotine, if applicable. Regular preventive services are another way to keep healthy. Preventive services (vaccines, screening tests, monitoring & exams) can help personalize your care plan, which helps you manage your own care. Screening tests can find health problems at the earliest stages, when they are easiest to treat. [facility] follows the current, evidence-based guidelines published by the [facility] ([facility]) when recommending preventive services for our patients. Because we follow these guidelines, sometimes recommendations change over time as research supports it. (For example, a prostate screening blood test is no longer routinely recommended for men with no symptoms). Of course, you and your doctor may decide to screen more often for some diseases, based on your risk and co-morbidities (chronic disease you are already diagnosed with). Preventive services for you include: - Medicare offers their members a free annual wellness visit, which is time for you and your primary care provider to discuss and plan for your preventive service needs. Take advantage of this benefit every year! 
-All adults over age 72 should receive the recommended pneumonia vaccines. Current USPSTF guidelines recommend a series of two vaccines for the best pneumonia protection.  
-All adults should have a flu vaccine yearly and tetanus vaccine every 10 years. 
-All adults age 48 and older should receive the shingles vaccines (series of two vaccines).       
-All adults age 38-68 who are overweight should have a diabetes screening test once every three years.  
-Other screening tests & preventive services for persons with diabetes include: an eye exam to screen for diabetic retinopathy, a kidney function test, a foot exam, and stricter control over your cholesterol.  
-Cardiovascular screening for adults with routine risk involves an electrocardiogram (ECG) at intervals determined by the provider.  
-Colorectal cancer screening should be done for adults age 54-65 with no increased risk factors for colorectal cancer. There are a number of acceptable methods of screening for this type of cancer. Each test has its own benefits and drawbacks. Discuss with your provider what is most appropriate for you during your annual wellness visit. The different tests include: colonoscopy (considered the best screening method), a fecal occult blood test, a fecal DNA test, and sigmoidoscopy. 
-All adults born between [facility] should be screened once for Hepatitis C. 
-An Abdominal Aortic Aneurysm (AAA) Screening is recommended for men age 73-68 who has ever smoked in their lifetime. Here is a list of your current Health Maintenance items (your personalized list of preventive services) with a due date: 
Health Maintenance Due Topic Date Due  Cholesterol Test   02/23/1980  
 DTaP/Tdap/Td  (1 - Tdap) 02/23/1991 [employer] Annual Well Visit  10/29/2018  Shingles Vaccine (1 of 2) 02/23/2020  Colon Cancer Stool Test  02/23/2020 Medicare Wellness Visit, Male The best way to live healthy is to have a lifestyle where you eat a well-balanced diet, exercise regularly, limit alcohol use, and quit all forms of tobacco/nicotine, if applicable. Regular preventive services are another way to keep healthy. Preventive services (vaccines, screening tests, monitoring & exams) can help personalize your care plan, which helps you manage your own care. Screening tests can find health problems at the earliest stages, when they are easiest to treat.   
[facility] follows the current, evidence-based guidelines published by the [employer] (USPSTF) when recommending preventive services for our patients. Because we follow these guidelines, sometimes recommendations change over time as research supports it. (For example, a prostate screening blood test is no longer routinely recommended for men with no symptoms). Of course, you and your doctor may decide to screen more often for some diseases, based on your risk and co-morbidities (chronic disease you are already diagnosed with). Preventive services for you include: - Medicare offers their members a free annual wellness visit, which is time for you and your primary care provider to discuss and plan for your preventive service needs. Take advantage of this benefit every year! 
-All adults over age 72 should receive the recommended pneumonia vaccines. Current USPSTF guidelines recommend a series of two vaccines for the best pneumonia protection.  
-All adults should have a flu vaccine yearly and tetanus vaccine every 10 years. 
-All adults age 48 and older should receive the shingles vaccines (series of two vaccines). -All adults age 38-68 who are overweight should have a diabetes screening test once every three years.  
-Other screening tests & preventive services for persons with diabetes include: an eye exam to screen for diabetic retinopathy, a kidney function test, a foot exam, and stricter control over your cholesterol.  
-Cardiovascular screening for adults with routine risk involves an electrocardiogram (ECG) at intervals determined by the provider.  
-Colorectal cancer screening should be done for adults age 54-65 with no increased risk factors for colorectal cancer. There are a number of acceptable methods of screening for this type of cancer. Each test has its own benefits and drawbacks. Discuss with your provider what is most appropriate for you during your annual wellness visit.  The different tests include: colonoscopy (considered the best screening method), a fecal occult blood test, a fecal DNA test, and sigmoidoscopy. 
-All adults born between [facility] should be screened once for Hepatitis C. 
-An Abdominal Aortic Aneurysm (AAA) Screening is recommended for men age 73-68 who has ever smoked in their lifetime. Here is a list of your current Health Maintenance items (your personalized list of preventive services) with a due date: 
Health Maintenance Due Topic Date Due  Cholesterol Test   02/23/1980  
 DTaP/Tdap/Td  (1 - Tdap) 02/23/1991 [address] [place] [address] Annual Well Visit  10/29/2018  Shingles Vaccine (1 of 2) 02/23/2020  Colon Cancer Stool Test  02/23/2020

## 2020-04-09 ENCOUNTER — TELEPHONE (OUTPATIENT)
Dept: INTERNAL MEDICINE CLINIC | Age: 50
End: 2020-04-09

## 2020-04-09 RX ORDER — FLASH GLUCOSE SCANNING READER
1 EACH MISCELLANEOUS SEE ADMIN INSTRUCTIONS
Qty: 1 EACH | Refills: 0 | Status: SHIPPED | OUTPATIENT
Start: 2020-04-09 | End: 2020-10-26

## 2020-04-09 RX ORDER — FLASH GLUCOSE SENSOR
1 KIT MISCELLANEOUS SEE ADMIN INSTRUCTIONS
Qty: 2 KIT | Refills: 11 | Status: SHIPPED | OUTPATIENT
Start: 2020-04-09 | End: 2021-03-02 | Stop reason: DRUGHIGH

## 2020-04-09 NOTE — TELEPHONE ENCOUNTER
Pharmacy Progress Note - Telephone Encounter    Mr. Demetrius Leo 48 y.o. male, referred by Dr. Consuelo Bowles MD, was contacted via an outbound telephone call to discuss Lashawn CGM today. Verified patients identifiers (name & ) per HIPAA policy. Call discussed with Jose Amaynor Rodriguezil, patient's father (waived on PHI). - Confirmed with Kenisha - Lashawn CGM now covered for $0 copay. - Recommend Fidencio  rx as soon as possible. Contact me for Hoff education & application  - Cari Presley endorses understanding to the provided information. All questions answered at this time.      Thank you,  Naya Grady, PharmD, BCACP, CDE

## 2020-04-09 NOTE — TELEPHONE ENCOUNTER
Pharmacy Progress Note - Telephone Encounter    S/O: Mr. Maikel Bowling 48 y.o. male, referred by Dr. Marla Redding MD, was contacted via an outbound telephone call to discuss his diabetic testing supplies today. Verified patients identifiers (name & ) per HIPAA policy. Call was discussed with patient's father, Carmine Benavides. Waived on patient's PHI. Carmine Benavides reports it is very difficult to monitor BG w/ glucometer - \"he has very thick skin\" \"it's very difficult to get blood\"  Reports patient has started metformin 500 mg BID w/o issues. Interested in Romi Cabezas CGM  Has Medicare/Medicaid. [address] Past Medical History:   Diagnosis Date    Arthritis     scoliosis    Down's syndrome     DVT (deep venous thrombosis) ([facility] 2014    Gastrointestinal disorder     GERD, Robin's Esophagus    Other ill-defined conditions(799.89)     Down Syndrom with mild mental retardation    Other ill-defined conditions(799.89)     mixed hyperlipidemia/hypercholesteremia    Other ill-defined conditions(799.89)     myopia    Other ill-defined conditions(799.89)     sleep apnea, uses cpap at night    Sleep apnea     uses cpap    Thromboembolus ([facility]      Current Outpatient Medications on File Prior to Visit   Medication Sig Dispense Refill    metFORMIN (GLUCOPHAGE) 500 mg tablet Take 1 Tab by mouth two (2) times daily (with meals). 180 Tab 0    ondansetron hcl (ZOFRAN, AS HYDROCHLORIDE,) 4 mg tablet Take 1 Tab by mouth every eight (8) hours as needed for Nausea. 20 Tab 0    famotidine (PEPCID) 20 mg tablet Take 1 Tab by mouth two (2) times a day. 20 Tab 0    atorvastatin (LIPITOR) 40 mg tablet Take 40 mg by mouth nightly.  ZS-TS-VG-Fe-Min-Lycopen-Lutein (CENTRUM) 0.4-162-18 mg Tab Take 1 Tab by mouth daily.  vit B cmplx & C #11-ca-dha-Q10 (BRAIN MIGHT-DHA-CO Q10) 140-10-10 mg Tab Take 1 Tab by mouth daily.  calcium 600 mg cap Take 1 Tab by mouth daily.       Azelastine (ASTEPRO) 0.15 % (205.5 mcg) nasal spray 2 Sprays by Both Nostrils route as needed. No current facility-administered medications on file prior to visit. No results found for: HBA1C, HGBE8, [id number]      A/P:  - Will see if we can get Lashawn CGM covered for patient.  Chacorta Moran endorses understanding to the provided information. All questions answered at this time.      Thank you for the consult,  Naya Hall Che, PharmD, BCACP, CDE                                   CLINICAL PHARMACY CONSULT: MED RECONCILIATION/REVIEW ADDENDUM    For Pharmacy Admin Tracking Only    PHSO: PHSO Patient?: No  Total # of Interventions Recommended: Count: 2  - New Order #: 2 New Medication Order Reason(s): Needs Additional Medication Therapy    Time Spent (min): 10

## 2020-04-16 ENCOUNTER — TELEPHONE (OUTPATIENT)
Dept: INTERNAL MEDICINE CLINIC | Age: 50
End: 2020-04-16

## 2020-04-27 ENCOUNTER — TELEPHONE (OUTPATIENT)
Dept: INTERNAL MEDICINE CLINIC | Age: 50
End: 2020-04-27

## 2020-04-27 NOTE — TELEPHONE ENCOUNTER
Pharmacy Progress Note - Telephone Encounter    S/O: Mr. Alba Pascual 48 y.o. male, referred by Dr. Mere Rebolledo MD, was contacted via an outbound telephone call to discuss [facility] today. Verified patients identifiers (name & ) per HIPAA policy. Call was discussed with Rogelio's father, Jami Manjarrez, (cleared for [facility]). - Jami Manjarrez requested for information regarding [id number] to share his son's CGM readings with us. A/P:  - Discuss [employer]. Shared with Jami Manjarrez our practice ID for connection.   - Recommend patient activate his MyChart access  - Fidencio endorses understanding to the provided information. All questions answered at this time.      Thank you,  Naya Sherman, PharmD, BCACP, CDE                                 CLINICAL PHARMACY CONSULT: MED RECONCILIATION/REVIEW ADDENDUM    For Pharmacy Admin Tracking Only    PHSO: PHSO Patient?: No  Time Spent (min): 5

## 2020-04-30 ENCOUNTER — TELEPHONE (OUTPATIENT)
Dept: INTERNAL MEDICINE CLINIC | Age: 50
End: 2020-04-30

## 2020-04-30 RX ORDER — METFORMIN HYDROCHLORIDE 500 MG/1
500 TABLET ORAL DAILY
Qty: 180 TAB | Refills: 0
Start: 2020-04-30 | End: 2021-02-15 | Stop reason: SDUPTHER

## 2020-04-30 NOTE — TELEPHONE ENCOUNTER
Pharmacy Progress Note - Telephone Encounter    S/O: Mr. Fermín Son 48 y.o. male contacted office/me via an inbound telephone call to discuss his Lashawn CGM and diabetes management today. Verified patients identifiers (name & ) per HIPAA policy. Call was discuss with [atient's father, Ramona Everett (cleared for patient's PHI). Interim history:  Started Lashawn CGM 2 weeks ago. Current regimen:  Metformin 500 mg BID    - Fidencio reports patient's nutrition has significantly changed since staying home more d/t COVID 19. Reports patient is walking daily and longer than before. He is worried about the low blood sugar readings seen over the past week. Saw readings of 69, 65, and 58 within the past 2 days.   - Shared patient's Luis Eduardo CGM report via [facility]. Between -20:   - 95% in the target range of . - Average glucose: 96  - Scans 3x/day  - 7 low blood sugar episodes (usually between midnight - 6PM)    Here are his averages:      Readings last 3 days:        Current Outpatient Medications   Medication Sig    flash glucose scanning reader (FreeStyle Lashawn 14 Day [place] [facility]) misc 1 Each by Does Not Apply route See Admin Instructions. Use to scan sensor at least 3 times daily. E11.9    flash glucose sensor (FreeStyle Lashawn 14 Day Sensor) kit 1 Each by Does Not Apply route See Ирина Rao. Apply and replace sensor every 14 days. Use to scan at least 3 times daily. E11.9    metFORMIN (GLUCOPHAGE) 500 mg tablet Take 1 Tab by mouth two (2) times daily (with meals).  ondansetron hcl (ZOFRAN, AS HYDROCHLORIDE,) 4 mg tablet Take 1 Tab by mouth every eight (8) hours as needed for Nausea.  famotidine (PEPCID) 20 mg tablet Take 1 Tab by mouth two (2) times a day.  atorvastatin (LIPITOR) 40 mg tablet Take 40 mg by mouth nightly.  HZ-VW-SM-Fe-Min-Lycopen-Lutein (CENTRUM) 0.4-162-18 mg Tab Take 1 Tab by mouth daily.     vit B cmplx & C #11-ca-dha-Q10 (BRAIN MIGHT-DHA-CO Q10) 140-10-10 mg Tab Take 1 Tab by mouth daily.  calcium 600 mg cap Take 1 Tab by mouth daily.  Azelastine (ASTEPRO) 0.15 % (205.5 mcg) nasal spray 2 Sprays by Both Nostrils route as needed. No current facility-administered medications for this visit. Lab Results   Component Value Date/Time    Sodium 141 05/22/2014 01:45 PM    Potassium 4.2 05/22/2014 01:45 PM    Chloride 104 05/22/2014 01:45 PM    CO2 30 05/22/2014 01:45 PM    Anion gap 7 05/22/2014 01:45 PM    Glucose 99 05/22/2014 01:45 PM    BUN 15 05/22/2014 01:45 PM    Creatinine 0.88 05/22/2014 01:45 PM    BUN/Creatinine ratio 17 05/22/2014 01:45 PM    GFR est AA >60 05/22/2014 01:45 PM    GFR est non-AA >60 05/22/2014 01:45 PM    Calcium 8.9 05/22/2014 01:45 PM     No results found for: HBA1C, HGBE8, MHA0WQXD  Last Point of Care HGB A1C  No results found for: BGP8WOSN   CrCl cannot be calculated (Patient's most recent lab result is older than the maximum 180 days allowed. ). A/P:  - Based on patient's CGM trends, recommend reducing Metformin to 500 mg daily with largest meal.   - Continue to scan sensor more often. Continue to upload readings. - Review hypoglycemia management steps again. Call if he continues to experience BG <70.  - Will print out patient's [facility] AGP report to be scanned in.   - Will check in ~2-3 weeks. - Max Richter endorses understanding to the provided information. All questions answered at this time. Note routed to Dr. Cristy Braswell for review.      Thank you,  Naya Alvarez, PharmD, BCACP, CDE                                   CLINICAL PHARMACY CONSULT: MED RECONCILIATION/REVIEW ADDENDUM    For Pharmacy Admin Tracking Only    PHSO: PHSO Patient?: No  Total # of Interventions Recommended: Count: 1  - Decreased Dose #: 1  Time Spent (min): 30

## 2020-05-06 ENCOUNTER — TELEPHONE (OUTPATIENT)
Dept: INTERNAL MEDICINE CLINIC | Age: 50
End: 2020-05-06

## 2020-05-06 NOTE — TELEPHONE ENCOUNTER
Pharmacy Progress Note - Telephone Encounter    S/O: Mr. Jaida Garcia 48 y.o. 's father, Kip Ann contacted office/me via an inbound telephone call  today. Verified patients identifiers (name & ) per HIPAA policy. Kip Ann is waived regarding patient's Blondell Milvia wants to know how long Horacio Davey will have to wear the Lashawn CGM. - Patient now taking metformin 500 mg daily - dose decreased from BID d/t hypoglycemia on 20. Reports the following readings:  Date Before Breakfast Before Lunch Before Dinner Bedtime   2020  58 126 150   2020 112 114  121   2020 109  95 131   5/3/2020 115  126 160   2020 111   136   2020 108  107 151   2020 115        A/P:  - Clarified that Lashawn CGM is in lieu of traditional glucometer. Pt will need to continue w/ current monitoring device until recommended by Dr. Jorge Schilder to stop. With recent fluctuating blood sugar, I would not recommend stopping Lashawn. - Has follow up scheduled w/ Dr. Jorge Schilder on 20  Toidris Hurtado endorses understanding to the provided information. All questions answered at this time.      Thank you,  Naya Feng, PharmD, BCACP, CDE                             CLINICAL PHARMACY CONSULT: MED RECONCILIATION/REVIEW ADDENDUM    For Pharmacy Admin Tracking Only    PHSO: PHSO Patient?: No  Time Spent (min): 15

## 2020-06-03 ENCOUNTER — TELEPHONE (OUTPATIENT)
Dept: INTERNAL MEDICINE CLINIC | Age: 50
End: 2020-06-03

## 2020-06-03 NOTE — TELEPHONE ENCOUNTER
Pharmacy Progress Note - Telephone Encounter    S/O: Mr. Jamie Garcia 48 y.o. 's father, Danish May, contacted office/me via an inbound telephone call to discuss his son's CGM trends today. Verified patients identifiers (name & ) per HIPAA policy. Current regimen:  Metformin 500 mg daily     - Reports 30 day avg glucose was 95  - Rogelio had a fasting low BG reading of 69 today             A/P:   - Discuss Pt still needs to have labs collected. Will fax lab orders to [employer] for collection. Pt plans to go tomorrow. - May benefit from holding off on metformin therapy. - Patient endorses understanding to the provided information. All questions answered at this time.      Thank you,  Naya Camarillo, PharmD, BCACP, CDE                                               CLINICAL PHARMACY CONSULT: MED RECONCILIATION/REVIEW ADDENDUM    For Pharmacy Admin Tracking Only    PHSO: PHSO Patient?: No

## 2020-06-06 LAB
ALBUMIN SERPL-MCNC: 4.5 G/DL (ref 4–5)
ALBUMIN/CREAT UR: <9 MG/G CREAT (ref 0–29)
ALBUMIN/GLOB SERPL: 1.8 {RATIO} (ref 1.2–2.2)
ALP SERPL-CCNC: 79 IU/L (ref 39–117)
ALT SERPL-CCNC: 26 IU/L (ref 0–44)
AST SERPL-CCNC: 18 IU/L (ref 0–40)
BILIRUB SERPL-MCNC: 0.8 MG/DL (ref 0–1.2)
BUN SERPL-MCNC: 13 MG/DL (ref 6–24)
BUN/CREAT SERPL: 18 (ref 9–20)
CALCIUM SERPL-MCNC: 9.1 MG/DL (ref 8.7–10.2)
CHLORIDE SERPL-SCNC: 100 MMOL/L (ref 96–106)
CHOLEST SERPL-MCNC: 129 MG/DL (ref 100–199)
CO2 SERPL-SCNC: 28 MMOL/L (ref 20–29)
CREAT SERPL-MCNC: 0.74 MG/DL (ref 0.76–1.27)
CREAT UR-MCNC: 35 MG/DL
ERYTHROCYTE [DISTWIDTH] IN BLOOD BY AUTOMATED COUNT: 15.3 % (ref 11.6–15.4)
EST. AVERAGE GLUCOSE BLD GHB EST-MCNC: 123 MG/DL
GLOBULIN SER CALC-MCNC: 2.5 G/DL (ref 1.5–4.5)
GLUCOSE SERPL-MCNC: 118 MG/DL (ref 65–99)
HBA1C MFR BLD: 5.9 % (ref 4.8–5.6)
HCT VFR BLD AUTO: 40 % (ref 37.5–51)
HDLC SERPL-MCNC: 42 MG/DL
HGB BLD-MCNC: 13.4 G/DL (ref 13–17.7)
LDLC SERPL CALC-MCNC: 58 MG/DL (ref 0–99)
MCH RBC QN AUTO: 29.6 PG (ref 26.6–33)
MCHC RBC AUTO-ENTMCNC: 33.5 G/DL (ref 31.5–35.7)
MCV RBC AUTO: 88 FL (ref 79–97)
MICROALBUMIN UR-MCNC: <3 UG/ML
PLATELET # BLD AUTO: 314 X10E3/UL (ref 150–450)
POTASSIUM SERPL-SCNC: 4.3 MMOL/L (ref 3.5–5.2)
PROT SERPL-MCNC: 7 G/DL (ref 6–8.5)
PSA SERPL-MCNC: 0.3 NG/ML (ref 0–4)
RBC # BLD AUTO: 4.53 X10E6/UL (ref 4.14–5.8)
SODIUM SERPL-SCNC: 140 MMOL/L (ref 134–144)
TRIGL SERPL-MCNC: 144 MG/DL (ref 0–149)
TSH SERPL DL<=0.005 MIU/L-ACNC: 1.89 UIU/ML (ref 0.45–4.5)
VLDLC SERPL CALC-MCNC: 29 MG/DL (ref 5–40)
WBC # BLD AUTO: 4.7 X10E3/UL (ref 3.4–10.8)

## 2020-06-10 RX ORDER — CLEMASTINE FUMARATE 2.68 MG/1
1 TABLET ORAL 2 TIMES DAILY
COMMUNITY
Start: 2020-04-25 | End: 2020-06-10 | Stop reason: SDUPTHER

## 2020-06-10 RX ORDER — CLEMASTINE FUMARATE 2.68 MG/1
2.68 TABLET ORAL 2 TIMES DAILY
Qty: 60 TAB | Refills: 3 | Status: SHIPPED | OUTPATIENT
Start: 2020-06-10 | End: 2020-06-11 | Stop reason: SDUPTHER

## 2020-06-10 NOTE — TELEPHONE ENCOUNTER
PCP: Jocelyne Rivers MD    Last appt: 4/6/2020  Future Appointments   Date Time Provider Nasra Mcguire   7/9/2020 10:30 AM Jocelyne Rivers MD [address]   10/1/2020  9:00 AM Deatra Schwab,  [address]       Requested Prescriptions     Pending Prescriptions Disp Refills    clemastine 2.68 mg tab tablet 60 Tab 3     Sig: Take 1 Tab by mouth two (2) times a day.

## 2020-06-11 RX ORDER — CLEMASTINE FUMARATE 2.68 MG/1
2.68 TABLET ORAL 2 TIMES DAILY
Qty: 180 TAB | Refills: 1 | Status: SHIPPED | OUTPATIENT
Start: 2020-06-11 | End: 2021-02-15

## 2020-07-08 NOTE — PROGRESS NOTES
HISTORY OF PRESENT ILLNESS  Leilani Gutierrez is a 48 y.o. male. HPI     This is an NPvisit completed with telemedicine was completed with video assist  the patient acknowledges and agrees to this method of visitation  preet     Last visit was 4/6/20 Here for routine care     Does not monitor bp routinely advised home monitoring has no h/o htn      Wt is 197 pt reports- stable x lov      BS is 126 92 97 102 95   He takes metformin for DM 500mg daily  Patient is using a Freestyle julius monitor and notes some discrepancy between this and his fingerstick blood sugars ultimately he has occasionally had a low sugar once was 59 but then the fingerstick was 126 no true low readings    Reviewed labs     Patient has been talking with Pharm. D. about blood sugar readings as well    the patient has a h/o hlp   On lipitor     On pepcid for gerd--works well   Takes this twice daily no issues     Takes mvi  Take vit b12  Takes allegra for allergies     Takes calcium  Takes vitamin d 2000units        Has sergio--he weasrs cpap each night 7/20  He sees Dr. Gudelia Ledesma  Last visit 9/19      Sees dr. Mary Nuñez  Has a chronic cough, thought maybe gerd or reflux,   took gaviscon in the past  Has been using gaviscon which has helped  He is on clemastine for a cough--I have been prescribing this to him most recently he takes it twice daily overall this works well and mother states he has not been coughing much  He states this has been helping      Got cate notes  Pt has h/o sergio, spastic esophagus and chronic cough     He has SERGIO and is compliant with his CPAP     Has bunions, not operated yet  Sees gretchen     Has a h/o rosacea  On metrogel for this    He c/o pain in throat in chest x as long as he can remember  He has chest pain all the time   Has not seen a cardio  Has had extensive pulmonary work-up in the past it is difficult to get a good history on his chest pain unclear if it is only at rest or exertional appears to be related to stress and anxiety at times mother provides most of the history and just feels that he is always had chest pain but has never seen a cardiologist we will go ahead and have this further evaluated at this time     Takes asa at night--has h/o isolated dvt no recurrent issues      PREVENTIVE:  Colonoscopy: 5/18 sara 10 yrs, has had egd as well   PSA: 6/20 0.3  AAA screen not needed  Tdap: 10/19  Pneumovax not yet--will get at f/u  when the office is open  Prev 13 not yet needed  shingrix not yet needed  Flu shot 10/19  Ophtho: summer 2019 [address] [facility] [address]  a1c 12/19 8.2 6/20 5.9  Lipids 6/20 ldl 58  Micro  12/19 6/20      Patient Active Problem List    Diagnosis Date Noted    Type 2 diabetes mellitus without complication, without long-term current use of insulin ([facility] 04/06/2020    SERGIO on CPAP 04/06/2020    Gastroesophageal reflux disease without esophagitis 04/04/2020    Mixed hyperlipidemia 04/04/2020    DVT (deep venous thrombosis) ([facility] 07/17/2014    Down's syndrome      Current Outpatient Medications   Medication Sig Dispense Refill    clemastine 2.68 mg tab tablet Take 1 Tab by mouth two (2) times a day. 180 Tab 1    metFORMIN (GLUCOPHAGE) 500 mg tablet Take 1 Tab by mouth daily. 180 Tab 0    ondansetron hcl (ZOFRAN, AS HYDROCHLORIDE,) 4 mg tablet Take 1 Tab by mouth every eight (8) hours as needed for Nausea. 20 Tab 0    famotidine (PEPCID) 20 mg tablet Take 1 Tab by mouth two (2) times a day. 20 Tab 0    atorvastatin (LIPITOR) 40 mg tablet Take 40 mg by mouth nightly.  PY-SV-UA-Fe-Min-Lycopen-Lutein (CENTRUM) 0.4-162-18 mg Tab Take 1 Tab by mouth daily.  vit B cmplx & C #11-ca-dha-Q10 (BRAIN MIGHT-DHA-CO Q10) 140-10-10 mg Tab Take 1 Tab by mouth daily.  calcium 600 mg cap Take 1 Tab by mouth daily.  Azelastine (ASTEPRO) 0.15 % (205.5 mcg) nasal spray 2 Sprays by Both Nostrils route as needed.       flash glucose scanning reader ([employer] Lashawn 14 Day [place]) misc 1 Each by Does Not Apply route See Admin Instructions. Use to scan sensor at least 3 times daily. E11.9 1 Each 0    flash glucose sensor (FreeStyle Lashawn 14 Day Sensor) kit 1 Each by Does Not Apply route See Ирина Rao. Apply and replace sensor every 14 days. Use to scan at least 3 times daily. E11.9 2 Kit 11     Past Surgical History:   Procedure Laterality Date    COLONOSCOPY N/A 5/31/2018    COLONOSCOPY performed by Eryn [facility] at [facility] ENDOSCOPY    HX CHOLECYSTECTOMY      HX OTHER SURGICAL      convergent strabismus s/p corrective surgery      Lab Results   Component Value Date/Time    WBC 4.7 06/05/2020 10:11 AM    HGB 13.4 06/05/2020 10:11 AM    HCT 40.0 06/05/2020 10:11 AM    PLATELET 318 08/62/9183 10:11 AM    MCV 88 06/05/2020 10:11 AM     Lab Results   Component Value Date/Time    Cholesterol, total 129 06/05/2020 10:11 AM    HDL Cholesterol 42 06/05/2020 10:11 AM    LDL, calculated 58 06/05/2020 10:11 AM    Triglyceride 144 06/05/2020 10:11 AM     Lab Results   Component Value Date/Time    GFR est non- 06/05/2020 10:11 AM    GFR est  06/05/2020 10:11 AM    Creatinine 0.74 (L) 06/05/2020 10:11 AM    BUN 13 06/05/2020 10:11 AM    Sodium 140 06/05/2020 10:11 AM    Potassium 4.3 06/05/2020 10:11 AM    Chloride 100 06/05/2020 10:11 AM    CO2 28 06/05/2020 10:11 AM    Magnesium 2.0 05/22/2014 01:45 PM        Review of Systems   Respiratory: Negative for shortness of breath. Cardiovascular: Positive for chest pain. Physical Exam  Constitutional:       Appearance: Normal appearance. He is not toxic-appearing or diaphoretic. HENT:      Head: Normocephalic and atraumatic. Eyes:      Conjunctiva/sclera: Conjunctivae normal.      Pupils: Pupils are equal, round, and reactive to light. Neurological:      Mental Status: He is alert and oriented to person, place, and time. Mental status is at baseline.       Gait: Gait normal.   Psychiatric:         Mood and Affect: Mood normal.         Behavior: Behavior normal.         ASSESSMENT and PLAN    ICD-10-CM ICD-9-CM    1. Type 2 diabetes mellitus without complication, without long-term current use of insulin (HCC)     We will continue to monitor no change to dose E11.9 250.00    2. Mixed hyperlipidemia controlled on Lipitor E78.2 272.2    3. Down's syndrome --lives w parents who care for him Q90.9 758.0    4. Gastroesophageal reflux disease without esophagitis     Controlled on pepcid K21.9 530.81    5. SERGIO on CPAP  G47.33 327.23    Compliant w cpap continue Z99.89 V46.8    6. Cough     Patient has a long history of chronic cough has been evaluated by pulmonary he is on clemastine twice daily which seems to improve his symptoms continue R05 786.2    7. Atypical chest pain     Today reports a long history of atypical chest pain seems related to stress unclear really difficult to get an accurate history out of patient or family members    Patient has had a long history of pulmonary evaluation for chronic cough has had CTs for this also has a history of reflux    Which may be playing a role    However he is not had a cardiology evaluation in the past will go ahead and send him to cardiology and see if stress testing is indicated    Of note he does also have a history of spastic esophagus which may be the underlying cause in the end but will need to rule out a cardiac etiology given his other risk factors R07.89 786.59         Depression screen reviewed and negative        Scribed by Emmett Dancer of [facility], as dictated by Dr. Jessy Smith. Current diagnosis and concerns discussed with pt at length. Pt understands risks and benefits or current treatment plan and medications, and accepts the treatment and medication with any possible risks. Pt asks appropriate questions, which were answered. Pt was instructed to call with any concerns or problems. I have reviewed the note documented by the scribe. The services provided are my own.   The documentation is accurate       Castro Cheek, who was evaluated through a synchronous (real-time) audio-video encounter, and/or his healthcare decision maker, is aware that it is a billable service, with coverage as determined by his insurance carrier. He provided verbal consent to proceed: Yes, and patient identification was verified. It was conducted pursuant to the emergency declaration under the [address] [place] [address], [address] [place] [address] and the [employer] and [employer] Act. A caregiver was present when appropriate. Ability to conduct physical exam was limited. I was at home. The patient was at home.

## 2020-07-09 ENCOUNTER — VIRTUAL VISIT (OUTPATIENT)
Dept: INTERNAL MEDICINE CLINIC | Age: 50
End: 2020-07-09

## 2020-07-09 DIAGNOSIS — K21.9 GASTROESOPHAGEAL REFLUX DISEASE WITHOUT ESOPHAGITIS: ICD-10-CM

## 2020-07-09 DIAGNOSIS — Z99.89 OSA ON CPAP: ICD-10-CM

## 2020-07-09 DIAGNOSIS — R05.9 COUGH: ICD-10-CM

## 2020-07-09 DIAGNOSIS — G47.33 OSA ON CPAP: ICD-10-CM

## 2020-07-09 DIAGNOSIS — R07.89 ATYPICAL CHEST PAIN: ICD-10-CM

## 2020-07-09 DIAGNOSIS — E78.2 MIXED HYPERLIPIDEMIA: ICD-10-CM

## 2020-07-09 DIAGNOSIS — Q90.9 DOWN'S SYNDROME: ICD-10-CM

## 2020-07-09 DIAGNOSIS — E11.9 TYPE 2 DIABETES MELLITUS WITHOUT COMPLICATION, WITHOUT LONG-TERM CURRENT USE OF INSULIN (HCC): Primary | ICD-10-CM

## 2020-07-13 ENCOUNTER — TELEPHONE (OUTPATIENT)
Dept: INTERNAL MEDICINE CLINIC | Age: 50
End: 2020-07-13

## 2020-07-13 NOTE — TELEPHONE ENCOUNTER
Pharmacy Progress Note - Telephone Encounter    S/O: Mr. Lukasz Lanza 48 y.o. male contacted office/me via an inbound telephone call  today. Verified patients identifiers (name & ) per HIPAA policy.     - Inquires about Lashawn readings compared to traditional glucometer. Notices that his Lashawn CGM readings tend to be on the lower range. Denies any low blood sugars   - Reports wt today was 182 lbs    - Sensor placement is currently in a location w/ more muscle than fat. Wt Readings from Last 3 Encounters:   19 197 lb (89.4 kg)   19 202 lb (91.6 kg)   18 195 lb 4 oz (88.6 kg)     Current Outpatient Medications   Medication Sig    clemastine 2.68 mg tab tablet Take 1 Tab by mouth two (2) times a day.  metFORMIN (GLUCOPHAGE) 500 mg tablet Take 1 Tab by mouth daily.  flash glucose scanning reader (FreeStyle Lashawn 14 Day [place]) misc 1 Each by Does Not Apply route See Admin Instructions. Use to scan sensor at least 3 times daily. E11.9    flash glucose sensor (FreeStyle Lashawn 14 Day Sensor) kit 1 Each by Does Not Apply route See Ирина Rao. Apply and replace sensor every 14 days. Use to scan at least 3 times daily. E11.9    ondansetron hcl (ZOFRAN, AS HYDROCHLORIDE,) 4 mg tablet Take 1 Tab by mouth every eight (8) hours as needed for Nausea.  famotidine (PEPCID) 20 mg tablet Take 1 Tab by mouth two (2) times a day.  atorvastatin (LIPITOR) 40 mg tablet Take 40 mg by mouth nightly.  EY-RQ-IU-Fe-Min-Lycopen-Lutein (CENTRUM) 0.4-162-18 mg Tab Take 1 Tab by mouth daily.  vit B cmplx & C #11-ca-dha-Q10 (BRAIN MIGHT-DHA-CO Q10) 140-10-10 mg Tab Take 1 Tab by mouth daily.  calcium 600 mg cap Take 1 Tab by mouth daily.  Azelastine (ASTEPRO) 0.15 % (205.5 mcg) nasal spray 2 Sprays by Both Nostrils route as needed. No current facility-administered medications for this visit.       Lab Results   Component Value Date/Time    Hemoglobin A1c 5.9 (H) 2020 10:11 AM A/P:  - Recommend placing Lashawn sensor in an area w/ more adipose tissue to see if this will help calibrate CGM readings. - Patient endorses understanding to the provided information. All questions answered at this time.        Thank you,  Naya Sanchez, PharmD, BCACP, CDE       CLINICAL PHARMACY CONSULT: MED RECONCILIATION/REVIEW ADDENDUM    For Pharmacy Admin Tracking Only    PHSO: PHSO Patient?: No    Time Spent (min): 15

## 2020-07-13 NOTE — TELEPHONE ENCOUNTER
Pharmacy Progress Note - Telephone Call    Mr. Mandi Shepard 48 y.o. was contacted via an outbound telephone call regarding patient's call to me earlier today. A voicemail was left for patient to return my call.        Thank you,  Naya Wick, PharmD, BCACP, CDE        CLINICAL PHARMACY CONSULT: MED RECONCILIATION/REVIEW ADDENDUM    For Pharmacy Admin Tracking Only    PHSO: PHSO Patient?: No

## 2020-09-29 DIAGNOSIS — E11.9 TYPE 2 DIABETES MELLITUS WITHOUT COMPLICATION, WITHOUT LONG-TERM CURRENT USE OF INSULIN (HCC): Primary | ICD-10-CM

## 2020-09-29 RX ORDER — BLOOD SUGAR DIAGNOSTIC
STRIP MISCELLANEOUS
Qty: 25 STRIP | Refills: 0 | Status: SHIPPED | OUTPATIENT
Start: 2020-09-29 | End: 2020-10-01 | Stop reason: SDUPTHER

## 2020-09-29 NOTE — PROGRESS NOTES
Subjective/HPI:     Lazarus Acevedo is a 48 y.o. male is here for new patient consultation. The patient has medical hx significant for DM, SERGIO on CPAP, Hyperlipidemia, GERD, DVT and Downs syndrome. ROS is obtained from his mother d/t his cognitive impairment. He will mention he is having midsternal chest pain. It is random, not exertionally related all the time. He has a hx of reflux so they have been dismissing the symptoms related to this. He was seen by Dr Mauri Jacobs who felt it was time to see a cardiologist given his risk factors for CAD. He is not noted to be sob or have CRUZ, no noted orthopnea or PND, lightheadedness or dizziness. No complaints of palpitations. Symptoms started in the past year. Previous cardiac evaluation includes none. Family med hx significant for HTN, CVA, DM.     Patient Active Problem List    Diagnosis Date Noted    Type 2 diabetes mellitus without complication, without long-term current use of insulin ([facility] 04/06/2020    SERGIO on CPAP 04/06/2020    Gastroesophageal reflux disease without esophagitis 04/04/2020    Mixed hyperlipidemia 04/04/2020    DVT (deep venous thrombosis) ([facility] [address] 07/17/2014    Down's syndrome       Jaswant Hernandez MD  Past Medical History:   Diagnosis Date    Arthritis     scoliosis    Down's syndrome     DVT (deep venous thrombosis) ([facility] [address] 7/17/2014    Gastrointestinal disorder     GERD, Robin's Esophagus    Other ill-defined conditions(799.89)     Down Syndrom with mild mental retardation    Other ill-defined conditions(799.89)     mixed hyperlipidemia/hypercholesteremia    Other ill-defined conditions(799.89)     myopia    Other ill-defined conditions(799.89)     sleep apnea, uses cpap at night    Sleep apnea     uses cpap    Thromboembolus [facility])       Past Surgical History:   Procedure Laterality Date    COLONOSCOPY N/A 5/31/2018    COLON[facility] performed by D[facility]s [facility] at [facility] ENDOSCOPY    HX CHOLECYSTECTOMY      HX OTHER SURGICAL convergent strabismus s/p corrective surgery     No Known Allergies   Family History   Problem Relation Age of Onset    Hypertension Mother     Diabetes Mother    [facility] Asthma Father     Diabetes Father     Asthma Sister     Bleeding Prob Maternal Grandfather     Arthritis-osteo Paternal Grandmother     Alcohol abuse Paternal Grandmother     Lung Disease Paternal Grandmother     Hypertension Paternal Grandmother     Alcohol abuse Paternal Grandfather     Heart Disease Paternal Grandfather       Current Outpatient Medications   Medication Sig    fexofenadine (ALLEGRA) 180 mg tablet Take 180 mg by mouth daily.  cholecalciferol, vitamin D3, (Vitamin D3) 50 mcg (2,000 unit) tab Take  by mouth daily.  aspirin delayed-release 81 mg tablet Take  by mouth daily.  PSEUDOEPHEDRINE-guaiFENesin (Mucinex D)  mg per tablet Take 1 Tab by mouth every twelve (12) hours.  glucose blood VI test strips (FreeStyle Precision Aleksandr Strips) strip Check once daily    clemastine 2.68 mg tab tablet Take 1 Tab by mouth two (2) times a day.  metFORMIN (GLUCOPHAGE) 500 mg tablet Take 1 Tab by mouth daily.  flash glucose scanning reader (FreeStyle Lashawn 14 Day [place]) misc 1 Each by Does Not Apply route See Admin Instructions. Use to scan sensor at least 3 times daily. E11.9    flash glucose sensor (FreeStyle Lashawn 14 Day Sensor) kit 1 Each by Does Not Apply route See Ирина Rao. Apply and replace sensor every 14 days. Use to scan at least 3 times daily. E11.9    famotidine (PEPCID) 20 mg tablet Take 1 Tab by mouth two (2) times a day.  atorvastatin (LIPITOR) 40 mg tablet Take 40 mg by mouth nightly.  MW-UZ-TB-Fe-Min-Lycopen-Lutein (CENTRUM) 0.4-162-18 mg Tab Take 1 Tab by mouth daily.  vit B cmplx & C #11-ca-dha-Q10 (BRAIN MIGHT-DHA-CO Q10) 140-10-10 mg Tab Take 1 Tab by mouth daily.  calcium 600 mg cap Take 1 Tab by mouth daily.     ondansetron hcl (ZOFRAN, AS HYDROCHLORIDE,) 4 mg tablet Take 1 Tab by mouth every eight (8) hours as needed for Nausea.  Azelastine (ASTEPRO) 0.15 % (205.5 mcg) nasal spray 2 Sprays by Both Nostrils route as needed. No current facility-administered medications for this visit. Vitals:    09/30/20 0858 09/30/20 0913   BP: 112/70 110/68   Pulse: 64    Resp: 18    Weight: 177 lb 8 oz (80.5 kg)    Height: 5' 5\" (1.651 m)        I have reviewed the nurses notes, vitals, problem list, allergy list, medical history, family, social history and medications. Review of Symptoms:obtained from mother  General: No noted excessive weight gain or loss. HEENT: No noted c/o blurred vision, headaches, epistaxis and difficulty swallowing. Respiratory: No noted shortness of breath, CRUZ. Cardiovascular: + precordial pain, no noted palpitations, edema or PND  Gastrointestinal: Denies that he has a poor appetite,+ indigestion,no noted abdominal pain or blood in stool  Urinary: Denies noted c/o dysuria, pyuria  Musculoskeletal: Denies noted pain or swelling from muscles or joints  Neurologic: Denies noted tremor, paresthesias, or sensory motor disturbance  Skin: Denies noted rash, itching or texture change. Psych: Denies c/o depression        Physical Exam:      General: Well developed, cooperative, alert in no acute distress, appears states age. HEENT: Supple, No carotid bruits, no JVD, trach is midline. PERRL, EOM intact  Heart:  Normal S1/S2 negative S3 or S4. Regular, no murmur, gallop or rub. Respiratory: Clear bilaterally x 4, no wheezing or rales  Abdomen:   Soft, non-tender, no masses, bowel sounds are active. Extremities:  No edema, normal cap refill, no cyanosis, atraumatic. Neuro: speech clear, gait stable. Skin: Skin color is normal. No rashes or lesions.  Non diaphoretic  Vascular: 2+ pulses symmetric in all extremities    Cardiographics    ECG: SR      Cardiology Labs:    Lab Results   Component Value Date/Time    Cholesterol, total 129 06/05/2020 10:11 AM HDL Cholesterol 42 06/05/2020 10:11 AM    LDL, calculated 58 06/05/2020 10:11 AM    VLDL, calculated 29 06/05/2020 10:11 AM     Lab Results   Component Value Date/Time    Sodium 140 06/05/2020 10:11 AM    Potassium 4.3 06/05/2020 10:11 AM    Chloride 100 06/05/2020 10:11 AM    CO2 28 06/05/2020 10:11 AM    Glucose 118 (H) 06/05/2020 10:11 AM    BUN 13 06/05/2020 10:11 AM    Creatinine 0.74 (L) 06/05/2020 10:11 AM    BUN/Creatinine ratio 18 06/05/2020 10:11 AM    GFR est  06/05/2020 10:11 AM    GFR est non- 06/05/2020 10:11 AM    Calcium 9.1 06/05/2020 10:11 AM    Anion gap 7 05/22/2014 01:45 PM    Bilirubin, total 0.8 06/05/2020 10:11 AM    ALT (SGPT) 26 06/05/2020 10:11 AM    Alk. phosphatase 79 06/05/2020 10:11 AM    Protein, total 7.0 06/05/2020 10:11 AM    Albumin 4.5 06/05/2020 10:11 AM    Globulin 3.8 05/22/2014 01:45 PM    A-G Ratio 1.8 06/05/2020 10:11 AM     Lab Results   Component Value Date/Time    Hemoglobin A1c 5.9 (H) 06/05/2020 10:11 AM        Assessment:     Assessment:      Diagnoses and all orders for this visit:    1. Precordial pain  -     AMB POC EKG ROUTINE W/ 12 LEADS, INTER & REP  -     EXERCISE CARDIAC STRESS TEST; Future    2. Mixed hyperlipidemia  -     EXERCISE CARDIAC STRESS TEST; Future    3. Gastroesophageal reflux disease without esophagitis  -     EXERCISE CARDIAC STRESS TEST; Future    4. SERGIO on CPAP  -     EXERCISE CARDIAC STRESS TEST; Future    5. Type 2 diabetes mellitus without complication, without long-term current use of insulin ([facility])  -     EXERCISE CARDIAC STRESS TEST; Future      Specialty Problems        Cardiology Problems    DVT (deep venous thrombosis) ([facility] [address]        Mixed hyperlipidemia        Type 2 diabetes mellitus without complication, without long-term current use of insulin ([facility] [address]              ICD-10-CM ICD-9-CM    1. Precordial pain  R07.2 786.51 AMB POC EKG ROUTINE W/ 12 LEADS, INTER & REP      EXERCISE CARDIAC STRESS TEST   2.  Mixed hyperlipidemia E78.2 272.2 EXERCISE CARDIAC STRESS TEST   3. Gastroesophageal reflux disease without esophagitis  K21.9 530.81 EXERCISE CARDIAC STRESS TEST   4. SERGIO on CPAP  G47.33 327.23 EXERCISE CARDIAC STRESS TEST    Z99.89 V46.8    5. Type 2 diabetes mellitus without complication, without long-term current use of insulin ([facility])  E11.9 250.00 EXERCISE CARDIAC STRESS TEST         PLAN:  1. Precordial pain  Patient with c/o chest pain, not necessarily exertionally related. Risk factors for CAD include DM and hyperlipidemia. EKG SR. Evaluate with an ekg treadmill stress test.     2. Mixed hyperlipidemia  On statin. Labs managed by PCP. LDL 58 in 6/20.    3. Gastroesophageal reflux disease without esophagitis  Taking Famotidine. Discussed short term use of PPI to see if his c/o chest pain resolve    4. SERGIO on CPAP  Compliant with his CPAP    5. Type 2 diabetes mellitus without complication, without long-term current use of insulin ([address]  Managed by PCP      F/U after stress testing if abnormal to discuss.      April Allen MD

## 2020-09-30 ENCOUNTER — OFFICE VISIT (OUTPATIENT)
Dept: CARDIOLOGY CLINIC | Age: 50
End: 2020-09-30
Payer: MEDICARE

## 2020-09-30 VITALS
BODY MASS INDEX: 29.57 KG/M2 | DIASTOLIC BLOOD PRESSURE: 68 MMHG | HEART RATE: 64 BPM | WEIGHT: 177.5 LBS | RESPIRATION RATE: 18 BRPM | SYSTOLIC BLOOD PRESSURE: 110 MMHG | HEIGHT: 65 IN

## 2020-09-30 DIAGNOSIS — E11.9 TYPE 2 DIABETES MELLITUS WITHOUT COMPLICATION, WITHOUT LONG-TERM CURRENT USE OF INSULIN (HCC): ICD-10-CM

## 2020-09-30 DIAGNOSIS — R07.2 PRECORDIAL PAIN: Primary | ICD-10-CM

## 2020-09-30 DIAGNOSIS — G47.33 OSA ON CPAP: ICD-10-CM

## 2020-09-30 DIAGNOSIS — E78.2 MIXED HYPERLIPIDEMIA: ICD-10-CM

## 2020-09-30 DIAGNOSIS — K21.9 GASTROESOPHAGEAL REFLUX DISEASE WITHOUT ESOPHAGITIS: ICD-10-CM

## 2020-09-30 DIAGNOSIS — Z99.89 OSA ON CPAP: ICD-10-CM

## 2020-09-30 PROCEDURE — 99203 OFFICE O/P NEW LOW 30 MIN: CPT | Performed by: INTERNAL MEDICINE

## 2020-09-30 PROCEDURE — 2022F DILAT RTA XM EVC RTNOPTHY: CPT | Performed by: INTERNAL MEDICINE

## 2020-09-30 PROCEDURE — G8427 DOCREV CUR MEDS BY ELIG CLIN: HCPCS | Performed by: INTERNAL MEDICINE

## 2020-09-30 PROCEDURE — 3044F HG A1C LEVEL LT 7.0%: CPT | Performed by: INTERNAL MEDICINE

## 2020-09-30 PROCEDURE — 3017F COLORECTAL CA SCREEN DOC REV: CPT | Performed by: INTERNAL MEDICINE

## 2020-09-30 PROCEDURE — 93000 ELECTROCARDIOGRAM COMPLETE: CPT | Performed by: INTERNAL MEDICINE

## 2020-09-30 PROCEDURE — G8510 SCR DEP NEG, NO PLAN REQD: HCPCS | Performed by: INTERNAL MEDICINE

## 2020-09-30 PROCEDURE — G8417 CALC BMI ABV UP PARAM F/U: HCPCS | Performed by: INTERNAL MEDICINE

## 2020-09-30 RX ORDER — MINERAL OIL
180 ENEMA (ML) RECTAL DAILY
COMMUNITY

## 2020-09-30 RX ORDER — ASPIRIN 81 MG/1
TABLET ORAL DAILY
COMMUNITY

## 2020-09-30 RX ORDER — GUAIFENESIN, PSEUDOEPHEDRINE HYDROCHLORIDE 600; 60 MG/1; MG/1
1 TABLET, EXTENDED RELEASE ORAL EVERY 12 HOURS
COMMUNITY

## 2020-09-30 RX ORDER — CHOLECALCIFEROL (VITAMIN D3) 125 MCG
CAPSULE ORAL DAILY
COMMUNITY

## 2020-09-30 NOTE — LETTER
9/30/20 Patient: Lisa Vivas YOB: 1970 Date of Visit: 9/30/2020 Francy Saldivar MD 
[address] [facility] [address] [address] VIA In Basket Dear Francy Saldivar MD, Thank you for referring Mr. Lisa Vivas to [address] [place] [address] for evaluation. My notes for this consultation are attached. If you have questions, please do not hesitate to call me. I look forward to following your patient along with you.  
 
 
Sincerely, 
 
Madison Joyce MD

## 2020-09-30 NOTE — PROGRESS NOTES
1. Have you been to the ER, urgent care clinic since your last visit? Hospitalized since your last visit? No    2. Have you seen or consulted any other health care providers outside of the [address] [place] [address] since your last visit? Include any pap smears or colon screening. No    Chief Complaint   Patient presents with    Chest Pain     NP, ref by Dr. Myrna Gonsalez.   C/O CP rest.

## 2020-10-01 DIAGNOSIS — E11.9 TYPE 2 DIABETES MELLITUS WITHOUT COMPLICATION, WITHOUT LONG-TERM CURRENT USE OF INSULIN (HCC): ICD-10-CM

## 2020-10-01 DIAGNOSIS — E78.2 MIXED HYPERLIPIDEMIA: ICD-10-CM

## 2020-10-01 DIAGNOSIS — K21.9 GASTROESOPHAGEAL REFLUX DISEASE WITHOUT ESOPHAGITIS: ICD-10-CM

## 2020-10-01 DIAGNOSIS — R07.2 PRECORDIAL PAIN: ICD-10-CM

## 2020-10-01 DIAGNOSIS — Z99.89 OSA ON CPAP: ICD-10-CM

## 2020-10-01 DIAGNOSIS — G47.33 OSA ON CPAP: ICD-10-CM

## 2020-10-01 RX ORDER — BLOOD SUGAR DIAGNOSTIC
STRIP MISCELLANEOUS
Qty: 100 STRIP | Refills: 1 | Status: SHIPPED | OUTPATIENT
Start: 2020-10-01

## 2020-10-01 NOTE — TELEPHONE ENCOUNTER
Future Appointments:  Future Appointments   Date Time Provider Nasra Mcguire   10/14/2020 10:00 AM JUDY NAILS BS AMB   10/21/2020  1:30 PM Palmira Quezada MD [facility] BS AMB   10/29/2020  3:40 PM Myrna Lopez MD [facility] BS AMB        Last Appointment With Me:  7/9/2020     Requested Prescriptions     Pending Prescriptions Disp Refills    glucose blood VI test strips (FreeStyle Precision Aleksandr Strips) strip 100 Strip 1     Sig: Check once daily

## 2020-10-08 ENCOUNTER — OFFICE VISIT (OUTPATIENT)
Dept: URGENT CARE | Age: 50
End: 2020-10-08
Payer: MEDICAID

## 2020-10-08 VITALS — TEMPERATURE: 97.8 F | RESPIRATION RATE: 12 BRPM | HEART RATE: 55 BPM | OXYGEN SATURATION: 95 %

## 2020-10-08 DIAGNOSIS — Z20.822 ENCOUNTER FOR LABORATORY TESTING FOR COVID-19 VIRUS: Primary | ICD-10-CM

## 2020-10-08 PROCEDURE — 99211 OFF/OP EST MAY X REQ PHY/QHP: CPT | Performed by: FAMILY MEDICINE

## 2020-10-11 LAB — SARS-COV-2, NAA: NOT DETECTED

## 2020-10-14 ENCOUNTER — ANCILLARY PROCEDURE (OUTPATIENT)
Dept: CARDIOLOGY CLINIC | Age: 50
End: 2020-10-14
Payer: MEDICAID

## 2020-10-14 ENCOUNTER — DOCUMENTATION ONLY (OUTPATIENT)
Dept: CARDIOLOGY CLINIC | Age: 50
End: 2020-10-14

## 2020-10-14 VITALS
SYSTOLIC BLOOD PRESSURE: 122 MMHG | HEIGHT: 65 IN | DIASTOLIC BLOOD PRESSURE: 65 MMHG | BODY MASS INDEX: 29.49 KG/M2 | WEIGHT: 177 LBS

## 2020-10-14 LAB
STRESS ANGINA INDEX: 0
STRESS BASELINE DIAS BP: 65 MMHG
STRESS BASELINE HR: 75 BPM
STRESS BASELINE SYS BP: 122 MMHG
STRESS ESTIMATED WORKLOAD: 8.5 METS
STRESS EXERCISE DUR MIN: 10.11 MIN:SEC
STRESS O2 SAT PEAK: 96 %
STRESS O2 SAT REST: 99 %
STRESS PEAK DIAS BP: 80 MMHG
STRESS PEAK SYS BP: 145 MMHG
STRESS PERCENT HR ACHIEVED: 86 %
STRESS POST PEAK HR: 146 BPM
STRESS RATE PRESSURE PRODUCT: NORMAL BPM*MMHG
STRESS SR DUKE TREADMILL SCORE: 10
STRESS ST DEPRESSION: 0 MM
STRESS ST ELEVATION: 0 MM
STRESS STAGE 1 BP: NORMAL MMHG
STRESS STAGE 1 DURATION: 3 MIN:SEC
STRESS STAGE 1 HR: 116 BPM
STRESS STAGE 2 BP: NORMAL MMHG
STRESS STAGE 2 COMMENTS: NORMAL
STRESS STAGE 2 DURATION: NORMAL MIN:SEC
STRESS STAGE 2 HR: 146 BPM
STRESS STAGE RECOVERY 1 BP: NORMAL MMHG
STRESS STAGE RECOVERY 1 DURATION: NORMAL MIN:SEC
STRESS STAGE RECOVERY 1 HR: 85 BPM
STRESS TARGET HR: 170 BPM

## 2020-10-14 PROCEDURE — 93015 CV STRESS TEST SUPVJ I&R: CPT | Performed by: INTERNAL MEDICINE

## 2020-10-19 NOTE — PROGRESS NOTES
HISTORY OF PRESENT ILLNESS  Joseph Jackson is a 48 y.o. male. HPI     Last visit was 7/9/20 Here for routine care  This is an established visit completed with telemedicine was completed with video assist  the patient acknowledges and agrees to this method of visitation preet  Presents with parents who provide hx     Worried about memory   He hasn't been going to work at [employer] due to [employer] has noticed that when they are in the kitchen, she will ask him to get something and he will get the wrong item  She wonders if it is due to him being upset or memory decline  Discussed we will keep an eye on this and wouldn't want to add meds on at this time     BP at home have been controlled, don't know readings   Think it has been in nl range  bp at cardio 110/68     Wt was 177 lbs at cardio - down 20 lbs x lov    It may have come down some but they have not checked     BS is 100 115, always in accepted range on julius   He takes metformin for DM 500mg daily    Reviewed labs   Ordered labs      Patient has been talking with Pharm. D. about blood sugar readings as well     the patient has a h/o hlp   On lipitor     On pepcid for gerd--works well   Takes this twice daily no issues  When he had bad reflux he would come ask for tums, he hasn't done this recently  Discussed taking omeprazole for increased reflux was previously on this but doing well so not taking it     Takes mvi  Take vit b12  Takes allegra for allergies - worse allergies right now      Takes calcium  Takes vitamin d 2000units         Has constantino--he weasrs cpap each night 10/20  There may be some instances where he takes it off -- unsure will be f/u  He sees Dr. Angelita Mcdaniel  Last visit 9/19  Next visit 10/29/20      Sees dr. Robert De La Rosa (pulm) for chronic cough from gerd/reflux   Reviewed note 5/31/19 on ppi, had ct 1/19 for pulm nodule, discussed cough restarting, did not have repeat ct scan, did have us of neck , needs repeat ct of chest for pulm nodule, discussed hx of dvt was in 2008, was on coumadin until 2018  Has been using gaviscon which has helped  He is on clemastine for a cough-this has been helping   Will get repeat ct unclear if he ever repeated the CT from last time in 2018  He c/o lump on neck  Discussed this is a lipoma and he has imaging of this before      Got cate notes  Pt has h/o sergio, spastic esophagus and chronic cough      Has bunions, not operated yet  Sees gretchen     Has a h/o rosacea  On metrogel for this     He c/o pain in throat in chest x as long as he can remember was sent to cardiology for this and had evaluation  He saw Dr. Manny Dumont (cardio) for CP  Reviewed note 9/30/20: 1. Precordial pain  Patient with c/o chest pain, not necessarily exertionally related. Risk factors for CAD include DM and hyperlipidemia. EKG SR. Evaluate with an ekg treadmill stress test.   2. Mixed hyperlipidemia  On statin. Labs managed by PCP. LDL 58 in 6/20.  3. Gastroesophageal reflux disease without esophagitis  Taking Famotidine. Discussed short term use of PPI to see if his c/o chest pain resolve  4. SERGIO on CPAP  Compliant with his CPAP  5.  Type 2 diabetes mellitus without complication, without long-term current use of insulin ([address]  Managed by PCP  Reviewed stress test 10/14/20 nl      Takes asa at night--has h/o isolated dvt in 2008 no recurrent issues   Was on coumadin until 2018    They would like referral for podiatry -- discussed camron gretchen    They would like referral for derm, discussed Dr. Alvarenga Inchsis:  Colonoscopy: 5/18 sara 10 yrs, has had egd as well   PSA: 6/20 0.3  AAA screen not needed  Tdap: 10/19  Pneumovax will send prescription   Prev 13 not yet needed  shingrix not yet needed  Flu shot 10/20  Ophtho: 7/20 amanda tower  a1c 12/19 8.2 6/20 5.9  Lipids 6/20 ldl 62  Micro  12/19 6/20    Patient Active Problem List    Diagnosis Date Noted    Type 2 diabetes mellitus without complication, without long-term current use of insulin ([address] 04/06/2020    SERGIO on CPAP 04/06/2020    Gastroesophageal reflux disease without esophagitis 04/04/2020    Mixed hyperlipidemia 04/04/2020    DVT (deep venous thrombosis) ([facility] 07/17/2014    Down's syndrome      Current Outpatient Medications   Medication Sig Dispense Refill    pneumococcal 23-ulises ps vaccine (PNEUMOVAX 23) 25 mcg/0.5 mL injection 0.5 mL by IntraMUSCular route once for 1 dose. 0.5 mL 0    fexofenadine (ALLEGRA) 180 mg tablet Take 180 mg by mouth daily.  cholecalciferol, vitamin D3, (Vitamin D3) 50 mcg (2,000 unit) tab Take  by mouth daily.  aspirin delayed-release 81 mg tablet Take  by mouth daily.  PSEUDOEPHEDRINE-guaiFENesin (Mucinex D)  mg per tablet Take 1 Tab by mouth every twelve (12) hours.  clemastine 2.68 mg tab tablet Take 1 Tab by mouth two (2) times a day. 180 Tab 1    metFORMIN (GLUCOPHAGE) 500 mg tablet Take 1 Tab by mouth daily. 180 Tab 0    ondansetron hcl (ZOFRAN, AS HYDROCHLORIDE,) 4 mg tablet Take 1 Tab by mouth every eight (8) hours as needed for Nausea. 20 Tab 0    famotidine (PEPCID) 20 mg tablet Take 1 Tab by mouth two (2) times a day. 20 Tab 0    atorvastatin (LIPITOR) 40 mg tablet Take 40 mg by mouth nightly.  NQ-YR-TD-Fe-Min-Lycopen-Lutein (CENTRUM) 0.4-162-18 mg Tab Take 1 Tab by mouth daily.  vit B cmplx & C #11-ca-dha-Q10 (BRAIN MIGHT-DHA-CO Q10) 140-10-10 mg Tab Take 1 Tab by mouth daily.  calcium 600 mg cap Take 1 Tab by mouth daily.  glucose blood VI test strips (FreeStyle Precision Aleksandr Strips) strip Check once daily 100 Strip 1    flash glucose scanning reader (FreeStyle Lashawn 14 Day [place]) misc 1 Each by Does Not Apply route See Admin Instructions. Use to scan sensor at least 3 times daily. E11.9 1 Each 0    flash glucose sensor (FreeStyle Lashawn 14 Day Sensor) kit 1 Each by Does Not Apply route See Ирина Rao. Apply and replace sensor every 14 days. Use to scan at least 3 times daily. E11.9 2 Kit 11    Azelastine (ASTEPRO) 0.15 % (205.5 mcg) nasal spray 2 Sprays by Both Nostrils route as needed. Past Surgical History:   Procedure Laterality Date    COLONOSCOPY N/A 5/31/2018    COL[facility] Lincoln Reveles [facility] END[facility]COPY    HX CHOLECYSTECTOMY      HX OTHER SURGICAL      convergent strabismus s/p corrective surgery      Lab Results   Component Value Date/Time    WBC 4.7 06/05/2020 10:11 AM    HGB 13.4 06/05/2020 10:11 AM    HCT 40.0 06/05/2020 10:11 AM    PLATELET 790 36/15/7502 10:11 AM    MCV 88 06/05/2020 10:11 AM     Lab Results   Component Value Date/Time    Cholesterol, total 129 06/05/2020 10:11 AM    HDL Cholesterol 42 06/05/2020 10:11 AM    LDL, calculated 58 06/05/2020 10:11 AM    Triglyceride 144 06/05/2020 10:11 AM     Lab Results   Component Value Date/Time    GFR est non- 06/05/2020 10:11 AM    GFR est  06/05/2020 10:11 AM    Creatinine 0.74 (L) 06/05/2020 10:11 AM    BUN 13 06/05/2020 10:11 AM    Sodium 140 06/05/2020 10:11 AM    Potassium 4.3 06/05/2020 10:11 AM    Chloride 100 06/05/2020 10:11 AM    CO2 28 06/05/2020 10:11 AM    Magnesium 2.0 05/22/2014 01:45 PM        Review of Systems   Respiratory: Negative for shortness of breath. Cardiovascular: Negative for chest pain. Physical Exam  Constitutional:       General: He is not in acute distress. Appearance: Normal appearance. He is not ill-appearing, toxic-appearing or diaphoretic. HENT:      Head: Normocephalic and atraumatic. Eyes:      General:         Right eye: No discharge. Left eye: No discharge. Conjunctiva/sclera: Conjunctivae normal.   Pulmonary:      Effort: No respiratory distress. Neurological:      Mental Status: He is alert and oriented to person, place, and time. Mental status is at baseline.       Gait: Gait normal.   Psychiatric:         Mood and Affect: Mood normal.         Behavior: Behavior normal.         ASSESSMENT and PLAN    ICD-10-CM ICD-9-CM    1. Type 2 diabetes mellitus without complication, without long-term current use of insulin (HCC)       Controlled on metformin 500 mg daily    Check A1c    Labs ordered he thinks he had an eye exam the summer will get notes for review   R75.7 519.58 METABOLIC PANEL, COMPREHENSIVE      HEMOGLOBIN A1C WITH EAG      REFERRAL TO DERMATOLOGY      REFERRAL TO PODIATRY   2. SERGIO on CPAP  Y91.94 511.78 METABOLIC PANEL, COMPREHENSIVE   Compliant with CPAP has sleep doctor appointment pending Z99.89 V46.8 HEMOGLOBIN A1C WITH EAG      REFERRAL TO DERMATOLOGY   3. Gastroesophageal reflux disease without esophagitis  O76.7 866.49 METABOLIC PANEL, COMPREHENSIVE      HEMOGLOBIN A1C WITH EAG   Controlled on Pepcid twice daily at 1 point was on Prilosec doing well without it   REFERRAL TO DERMATOLOGY   4. Mixed hyperlipidemia  V66.0 284.6 METABOLIC PANEL, COMPREHENSIVE   Controlled on Lipitor 40 mg daily   HEMOGLOBIN A1C WITH EAG      REFERRAL TO DERMATOLOGY   5. Down's syndrome  S30.9 002.2 METABOLIC PANEL, COMPREHENSIVE   Stable deficits lives with his parents who care for him    His mom does note that he is forgetful at times    Not currently just an additional evaluation   HEMOGLOBIN A1C WITH EAG      REFERRAL TO DERMATOLOGY   6. Seasonal allergic rhinitis due to pollen  F23.5 545.1 METABOLIC PANEL, COMPREHENSIVE   Improved with Allegra   HEMOGLOBIN A1C WITH EAG      REFERRAL TO DERMATOLOGY   7. Chronic cough       Improved with hummus diet and Pepcid R05 786.2 REFERRAL TO DERMATOLOGY   8. Lung nodule       We will repeat CT scan as it is unclear as to whether he had a follow-up from his 2018 scan has not seen pulmonary recently reviewed last pulmonary notes R91.1 793.11 CT CHEST WO CONT      REFERRAL TO DERMATOLOGY   9. Lipoma of neck       Stable asymptomatic benign head imaging D17.0 214.1            Scribed by Derek Bojorquez [facility], as dictated by Dr. Gallo Shen.      Current diagnosis and concerns discussed with pt at length. Pt understands risks and benefits or current treatment plan and medications, and accepts the treatment and medication with any possible risks. Pt asks appropriate questions, which were answered. Pt was instructed to call with any concerns or problems. I have reviewed the note documented by the scribe. The services provided are my own. The documentation is accurate     Danny Leonardo, who was evaluated through a synchronous (real-time) audio-video encounter, and/or his healthcare decision maker, is aware that it is a billable service, with coverage as determined by his insurance carrier. He provided verbal consent to proceed: Yes, and patient identification was verified. It was conducted pursuant to the emergency declaration under the [address] [place] [address], [address] [place] [address] authority and the [employer] General Act. A caregiver was present when appropriate. Ability to conduct physical exam was limited. I was in the office. The patient was at home.

## 2020-10-21 ENCOUNTER — VIRTUAL VISIT (OUTPATIENT)
Dept: INTERNAL MEDICINE CLINIC | Age: 50
End: 2020-10-21
Payer: MEDICAID

## 2020-10-21 DIAGNOSIS — E11.9 TYPE 2 DIABETES MELLITUS WITHOUT COMPLICATION, WITHOUT LONG-TERM CURRENT USE OF INSULIN (HCC): Primary | ICD-10-CM

## 2020-10-21 DIAGNOSIS — Z99.89 OSA ON CPAP: ICD-10-CM

## 2020-10-21 DIAGNOSIS — D17.0 LIPOMA OF NECK: ICD-10-CM

## 2020-10-21 DIAGNOSIS — Q90.9 DOWN'S SYNDROME: ICD-10-CM

## 2020-10-21 DIAGNOSIS — J30.1 SEASONAL ALLERGIC RHINITIS DUE TO POLLEN: ICD-10-CM

## 2020-10-21 DIAGNOSIS — R05.3 CHRONIC COUGH: ICD-10-CM

## 2020-10-21 DIAGNOSIS — K21.9 GASTROESOPHAGEAL REFLUX DISEASE WITHOUT ESOPHAGITIS: ICD-10-CM

## 2020-10-21 DIAGNOSIS — E78.2 MIXED HYPERLIPIDEMIA: ICD-10-CM

## 2020-10-21 DIAGNOSIS — R91.1 LUNG NODULE: ICD-10-CM

## 2020-10-21 DIAGNOSIS — G47.33 OSA ON CPAP: ICD-10-CM

## 2020-10-21 PROCEDURE — 99214 OFFICE O/P EST MOD 30 MIN: CPT | Performed by: INTERNAL MEDICINE

## 2020-10-21 NOTE — PATIENT INSTRUCTIONS
Medicare Wellness Visit, Male The best way to live healthy is to have a lifestyle where you eat a well-balanced diet, exercise regularly, limit alcohol use, and quit all forms of tobacco/nicotine, if applicable. Regular preventive services are another way to keep healthy. Preventive services (vaccines, screening tests, monitoring & exams) can help personalize your care plan, which helps you manage your own care. Screening tests can find health problems at the earliest stages, when they are easiest to treat. [facility] follows the current, evidence-based guidelines published by the [facility]varo Celine ([facility]) when recommending preventive services for our patients. Because we follow these guidelines, sometimes recommendations change over time as research supports it. (For example, a prostate screening blood test is no longer routinely recommended for men with no symptoms). Of course, you and your doctor may decide to screen more often for some diseases, based on your risk and co-morbidities (chronic disease you are already diagnosed with). Preventive services for you include: - Medicare offers their members a free annual wellness visit, which is time for you and your primary care provider to discuss and plan for your preventive service needs. Take advantage of this benefit every year! 
-All adults over age 72 should receive the recommended pneumonia vaccines. Current USPSTF guidelines recommend a series of two vaccines for the best pneumonia protection.  
-All adults should have a flu vaccine yearly and tetanus vaccine every 10 years. 
-All adults age 48 and older should receive the shingles vaccines (series of two vaccines).       
-All adults age 38-68 who are overweight should have a diabetes screening test once every three years.  
-Other screening tests & preventive services for persons with diabetes include: an eye exam to screen for diabetic retinopathy, a kidney function test, a foot exam, and stricter control over your cholesterol.  
-Cardiovascular screening for adults with routine risk involves an electrocardiogram (ECG) at intervals determined by the provider.  
-Colorectal cancer screening should be done for adults age 54-65 with no increased risk factors for colorectal cancer. There are a number of acceptable methods of screening for this type of cancer. Each test has its own benefits and drawbacks. Discuss with your provider what is most appropriate for you during your annual wellness visit. The different tests include: colonoscopy (considered the best screening method), a fecal occult blood test, a fecal DNA test, and sigmoidoscopy. 
-All adults born between [facility] should be screened once for Hepatitis C. 
-An Abdominal Aortic Aneurysm (AAA) Screening is recommended for men age 73-68 who has ever smoked in their lifetime. Here is a list of your current Health Maintenance items (your personalized list of preventive services) with a due date: 
Health Maintenance Due Topic Date Due  Pneumococcal Vaccine (1 of 1 - PPSV23) 02/23/1976 24 [facility] Diabetic Foot Care  02/23/1980 24 [facility] Eye Exam  02/23/1980  Shingles Vaccine (1 of 2) 02/23/2020

## 2020-10-22 DIAGNOSIS — E11.9 TYPE 2 DIABETES MELLITUS WITHOUT COMPLICATION, WITHOUT LONG-TERM CURRENT USE OF INSULIN (HCC): ICD-10-CM

## 2020-10-22 DIAGNOSIS — Z13.29 SCREENING FOR THYROID DISORDER: Primary | ICD-10-CM

## 2020-10-26 RX ORDER — FLASH GLUCOSE SCANNING READER
EACH MISCELLANEOUS
Qty: 1 EACH | Refills: 0 | Status: SHIPPED | OUTPATIENT
Start: 2020-10-26 | End: 2022-03-23

## 2020-10-28 RX ORDER — ATORVASTATIN CALCIUM 40 MG/1
40 TABLET, FILM COATED ORAL
Qty: 90 TAB | Refills: 1 | Status: SHIPPED | OUTPATIENT
Start: 2020-10-28 | End: 2021-05-10

## 2020-10-28 NOTE — TELEPHONE ENCOUNTER
----- Message from Pioneer Claudia sent at 10/26/2020 12:11 PM EDT -----  Regarding: Prescription Question  Contact: [phone]  This is Fidencio. David Mantilla has a prescription written by Dr. Jewell Jay for Atorvastatin 40mg tablets and it has been filled at [facility]. If you want him to continue with this medication please send a 90 day prescription to [facility].

## 2020-10-28 NOTE — TELEPHONE ENCOUNTER
Future Appointments:  Future Appointments   Date Time Provider Nasra Mcguire   10/29/2020 10:30 AM [facility] CT 2 [facility]   10/29/2020  3:40 PM Dandy Arvizu MD [facility] BS AMB   1/26/2021  8:15 AM Li Hollis MD [facility] BS AMB        Last Appointment With Me:  10/21/2020     Requested Prescriptions     Pending Prescriptions Disp Refills    atorvastatin (Lipitor) 40 mg tablet 90 Tab 1     Sig: Take 1 Tab by mouth nightly.

## 2020-10-29 ENCOUNTER — DOCUMENTATION ONLY (OUTPATIENT)
Dept: SLEEP MEDICINE | Age: 50
End: 2020-10-29

## 2020-10-29 ENCOUNTER — VIRTUAL VISIT (OUTPATIENT)
Dept: SLEEP MEDICINE | Age: 50
End: 2020-10-29
Payer: MEDICARE

## 2020-10-29 ENCOUNTER — HOSPITAL ENCOUNTER (OUTPATIENT)
Dept: CT IMAGING | Age: 50
Discharge: HOME | End: 2020-10-29
Attending: INTERNAL MEDICINE
Payer: MEDICARE

## 2020-10-29 DIAGNOSIS — G47.33 OBSTRUCTIVE SLEEP APNEA (ADULT) (PEDIATRIC): Primary | ICD-10-CM

## 2020-10-29 DIAGNOSIS — R91.1 LUNG NODULE: ICD-10-CM

## 2020-10-29 PROCEDURE — 3017F COLORECTAL CA SCREEN DOC REV: CPT | Performed by: INTERNAL MEDICINE

## 2020-10-29 PROCEDURE — 99213 OFFICE O/P EST LOW 20 MIN: CPT | Performed by: INTERNAL MEDICINE

## 2020-10-29 PROCEDURE — G8427 DOCREV CUR MEDS BY ELIG CLIN: HCPCS | Performed by: INTERNAL MEDICINE

## 2020-10-29 PROCEDURE — 71250 CT THORAX DX C-: CPT

## 2020-10-29 PROCEDURE — G8432 DEP SCR NOT DOC, RNG: HCPCS | Performed by: INTERNAL MEDICINE

## 2020-10-29 NOTE — PROGRESS NOTES
[address] [facility] [place]. [place], [address]  Tel.  [phone]  Fax. [address] [facility] [address]  [place], [address] [facility], [place]  Tel.  [phone]  Fax. [phone] [address] [facility]   Tel.  [phone]  Fax. [phone]       Telemedicine visit performed with verbal consent of the patient. Patient called and identity confirmed with 2 patient identifers    Patient was seen at home  Bradley Fox is a 48 y.o. male who was seen by synchronous (real-time) audio-video technology on 10/29/2020. Consent:  He and/or his healthcare decision maker is aware that this patient-initiated Telehealth encounter is the equivalent to a face to face encounter in the sleep disorder center and has provided verbal consent to proceed: Yes    I was in the office while conducting this encounter. S>Rogelio Tinsley is a 48 y.o. male seen at this telemedicine visit for a positive airway pressure follow-up. He reports some problems using the device. He is 80% compliant over the past 30 days. The following problems are identified:    Drowsiness no Problems exhaling no   Snoring no Forget to put on no   Mask Comfortable yes  Can't fall asleep no   Dry Mouth no Mask falls off no   Air Leaking YES Frequent awakenings no       Download reviewed. He admits that his sleep has improved on PAP therapy using full mask and heated tubing. No Known Allergies    He has a current medication list which includes the following prescription(s): atorvastatin, freestyle julius 14 day reader, freestyle precision edwin strips, fexofenadine, cholecalciferol (vitamin d3), aspirin delayed-release, pseudoephedrine-guaifenesin, clemastine, metformin, freestyle julius 14 day sensor, ondansetron hcl, famotidine, hi-vm-ll-fe-min-lycopen-lutein, vit b cplxc 11-calcium-dha-q10, calcium, and azelastine. .      He  has a past medical history of Arthritis, Down's syndrome, DVT (deep venous thrombosis) ([facility] [address] (7/17/2014), Gastrointestinal disorder, Other ill-defined conditions(799.89), Other ill-defined conditions(799.89), Other ill-defined conditions(799.89), Other ill-defined conditions(799.89), Sleep apnea, and Thromboembolus ([facility] [address] [place] Sleepiness Score: 10   and     which reflect improved sleep quality over therapy time. O>      Weight 177 lb  Vital Signs: (As obtained by patient/caregiver at home)        Constitutional: [x] Appears well-developed and well-nourished [x] No apparent distress      [] Abnormal -     Mental status: [x] Alert and awake  [x] Oriented to person/place/time [x] Able to follow commands    [] Abnormal -     Eyes:   EOM    [x]  Normal    [] Abnormal -   Sclera  [x]  Normal    [] Abnormal -          Discharge [x]  None visible   [] Abnormal -     HENT: [x] Normocephalic, atraumatic  [] Abnormal -     External Ears [x] Normal  [] Abnormal -    Neck: [x] No visualized mass [] Abnormal -     Pulmonary/Chest: [x] Respiratory effort normal   [x] No visualized signs of difficulty breathing or respiratory distress        [] Abnormal -       Neurological:        [x] No Facial Asymmetry (Cranial nerve 7 motor function) (limited exam due to video visit)          [x] No gaze palsy        [] Abnormal -          Skin:        [x] No significant exanthematous lesions or discoloration noted on facial skin         [] Abnormal -            Psychiatric:       [x] Normal Affect [] Abnormal -        Other pertinent observable physical exam findings:-            A>    ICD-10-CM ICD-9-CM    1. Obstructive sleep apnea (adult) (pediatric)  G47.33 327.23 AMB SUPPLY ORDER     AHI = 75 (8-14). On CPAP, Respironics :  11-13 cmH2O. Compliant:      yes    Therapeutic Response:  Positive    P>    he is compliant with PAP therapy and PAP continues to benefit patient and remains necessary for control of his sleep apnea. CPAP setting - he will continue on his current pressure settings.       * We have recommended a dedicated weight loss through appropriate diet and an exercise regimen as significant weight reduction has been shown to reduce severity of obstructive sleep apnea. * mask-fitting evaluation with dme to see if can get better fit. He has lost weight  I have ordered replacement supplies      * He was asked to contact our office for any problems regarding PAP therapy. * Counseling was provided regarding the importance of regular PAP use and on proper sleep hygiene and safe driving. * Re-enforced proper and regular cleaning for the device. All of his questions were addressed. Pursuant to the emergency declaration under the [facility], [facility] waiver authority and the [employer] Act, this Virtual  Visit was conducted, with patient's consent, to reduce the patient's risk of exposure to COVID-19 and provide continuity of care for an established patient. Services were provided through a video synchronous discussion virtually to substitute for in-person clinic visit.     María Guevara MD    Electronically signed by    Jane Granger MD  Diplomate in Sleep Medicine  [facility]

## 2020-10-29 NOTE — PATIENT INSTRUCTIONS
[address] [place]. [address] [facility], [address] Tel.  [phone] Fax. 100 [facility] [address] [place], [address] [facility] Tel.  [phone] Fax. [phone] [address] [facility] Angel Reyes  Tel.  [phone] Fax. [phone] PROPER SLEEP HYGIENE What to avoid · Do not have drinks with caffeine, such as coffee or black tea, for 8 hours before bed. · Do not smoke or use other types of tobacco near bedtime. Nicotine is a stimulant and can keep you awake. · Avoid drinking alcohol late in the evening, because it can cause you to wake in the middle of the night. · Do not eat a big meal close to bedtime. If you are hungry, eat a light snack. · Do not drink a lot of water close to bedtime, because the need to urinate may wake you up during the night. · Do not read or watch TV in bed. Use the bed only for sleeping and sexual activity. What to try · Go to bed at the same time every night, and wake up at the same time every morning. Do not take naps during the day. · Keep your bedroom quiet, dark, and cool. · Get regular exercise, but not within 3 to 4 hours of your bedtime. Cuca Mendez · Sleep on a comfortable pillow and mattress. · If watching the clock makes you anxious, turn it facing away from you so you cannot see the time. · If you worry when you lie down, start a worry book. Well before bedtime, write down your worries, and then set the book and your concerns aside. · Try meditation or other relaxation techniques before you go to bed. · If you cannot fall asleep, get up and go to another room until you feel sleepy. Do something relaxing. Repeat your bedtime routine before you go to bed again. · Make your house quiet and calm about an hour before bedtime. Turn down the lights, turn off the TV, log off the computer, and turn down the volume on music. This can help you relax after a busy day. Drowsy Driving The [facility] cites drowsiness as a causing factor in more than 067,450 police reported crashes annually, resulting in 76,000 injuries and 1,500 deaths. Other surveys suggest 55% of people polled have driven while drowsy in the past year, 23% had fallen asleep but not crashed, 3% crashed, and 2% had and accident due to drowsy driving. Who is at risk? Young Drivers: One study of drowsy driving accidents states that 55% of the drivers were under 25 years. Of those, 75% were male. Shift Workers and Travelers: People who work overnight or travel across time zones frequently are at higher risk of experiencing Circadian Rhythm Disorders. They are trying to work and function when their body is programed to sleep. Sleep Deprived: Lack of sleep has a serious impact on your ability to pay attention or focus on a task. Consistently getting less than the average of 8 hours your body needs creates partial or cumulative sleep deprivation. Untreated Sleep Disorders: Sleep Apnea, Narcolepsy, R.L.S., and other sleep disorders (untreated) prevent a person from getting enough restful sleep. This leads to excessive daytime sleepiness and increases the risk for drowsy driving accidents by up to 7 times. Medications / Alcohol: Even over the counter medications can cause drowsiness. Medications that impair a drivers attention should have a warning label. Alcohol naturally makes you sleepy and on its own can cause accidents. Combined with excessive drowsiness its effects are amplified. Signs of Drowsy Driving: * You don't remember driving the last few miles * You may drift out of your sameera * You are unable to focus and your thoughts wander * You may yawn more often than normal 
 * You have difficulty keeping your eyes open / nodding off * Missing traffic signs, speeding, or tailgating Prevention-  
 Good sleep hygiene, lifestyle and behavioral choices have the most impact on drowsy driving. There is no substitute for sleep and the average person requires 8 hours nightly. If you find yourself driving drowsy, stop and sleep. Consider the sleep hygiene tips provided during your visit as well. Medication Refill Policy: Refills for all medications require 1 week advance notice. Please have your pharmacy fax a refill request. We are unable to fax, or call in \"controled substance\" medications and you will need to pick these prescriptions up from our office. [employer] Activation Thank you for requesting access to [facility]. Please follow the instructions below to securely access and download your online medical record. [facility] allows you to send messages to your doctor, view your test results, renew your prescriptions, schedule appointments, and more. How Do I Sign Up? 1. In your internet browser, go to [email or web address] 2. Click on the First Time User? Click Here link in the Sign In box. You will see the New Member Sign Up page. 3. Enter your [facility] Access Code exactly as it appears below. You will not need to use this code after youve completed the sign-up process. If you do not sign up before the expiration date, you must request a new code. [facility] Access Code: Activation code not generated Current [facility] Status: Active (This is the date your [facility] access code will ) 4. Enter the last four digits of your Social Security Number (xxxx) and Date of Birth (mm/dd/yyyy) as indicated and click Submit. You will be taken to the next sign-up page. 5. Create a daPulset ID. This will be your [facility] login ID and cannot be changed, so think of one that is secure and easy to remember. 6. Create a [facility] password. You can change your password at any time. 7. Enter your Password Reset Question and Answer. This can be used at a later time if you forget your password. 8. Enter your e-mail address. You will receive e-mail notification when new information is available in [address] 9. Click Sign Up. You can now view and download portions of your medical record. 10. Click the Download Summary menu link to download a portable copy of your medical information. Additional Information If you have questions, please call [phone]. Remember, [facility] is NOT to be used for urgent needs. For medical emergencies, dial 911.

## 2020-11-17 LAB
ALBUMIN SERPL-MCNC: 4.4 G/DL (ref 4–5)
ALBUMIN/CREAT UR: <6 MG/G CREAT (ref 0–29)
ALBUMIN/GLOB SERPL: 1.7 {RATIO} (ref 1.2–2.2)
ALP SERPL-CCNC: 82 IU/L (ref 39–117)
ALT SERPL-CCNC: 27 IU/L (ref 0–44)
AST SERPL-CCNC: 24 IU/L (ref 0–40)
BILIRUB SERPL-MCNC: 0.9 MG/DL (ref 0–1.2)
BUN SERPL-MCNC: 12 MG/DL (ref 6–24)
BUN/CREAT SERPL: 14 (ref 9–20)
CALCIUM SERPL-MCNC: 9.2 MG/DL (ref 8.7–10.2)
CHLORIDE SERPL-SCNC: 98 MMOL/L (ref 96–106)
CO2 SERPL-SCNC: 27 MMOL/L (ref 20–29)
CREAT SERPL-MCNC: 0.84 MG/DL (ref 0.76–1.27)
CREAT UR-MCNC: 47.9 MG/DL
EST. AVERAGE GLUCOSE BLD GHB EST-MCNC: 123 MG/DL
GLOBULIN SER CALC-MCNC: 2.6 G/DL (ref 1.5–4.5)
GLUCOSE SERPL-MCNC: 102 MG/DL (ref 65–99)
HBA1C MFR BLD: 5.9 % (ref 4.8–5.6)
MICROALBUMIN UR-MCNC: <3 UG/ML
POTASSIUM SERPL-SCNC: 4.6 MMOL/L (ref 3.5–5.2)
PROT SERPL-MCNC: 7 G/DL (ref 6–8.5)
SODIUM SERPL-SCNC: 140 MMOL/L (ref 134–144)
TSH SERPL DL<=0.005 MIU/L-ACNC: 2.38 UIU/ML (ref 0.45–4.5)

## 2020-12-02 ENCOUNTER — PATIENT MESSAGE (OUTPATIENT)
Dept: INTERNAL MEDICINE CLINIC | Age: 50
End: 2020-12-02

## 2020-12-03 RX ORDER — FLASH GLUCOSE SENSOR
1 KIT MISCELLANEOUS SEE ADMIN INSTRUCTIONS
Qty: 2 KIT | Refills: 5 | Status: SHIPPED | OUTPATIENT
Start: 2020-12-03 | End: 2021-03-02 | Stop reason: SDUPTHER

## 2020-12-03 NOTE — TELEPHONE ENCOUNTER
Pharmacy Progress Note     Entry for Hoff 2 sensors order to [facility] per patient's request.      Orders Placed This Encounter    flash glucose sensor (FreeStyle Lashawn 2 Sensor) kit     Si Each by Does Not Apply route See Admin Instructions. Replace and apply sensor every 14 days. Use to scan sensor at least 3 times daily. Dispense:  2 Kit     Refill:  5     Pt already has the Hoff 2 reader.     Thank you for the consult,  Naya Goyal, PharmD, BCACP, [facility]                CLINICAL PHARMACY CONSULT: MED RECONCILIATION/REVIEW ADDENDUM    For Pharmacy Admin Tracking Only    PHSO: PHSO Patient?: No  Total # of Interventions Recommended: Count: 1  - New Order #: 1 New Medication Order Reason(s): Needs Additional Medication Therapy

## 2021-01-11 ENCOUNTER — TELEPHONE (OUTPATIENT)
Dept: SLEEP MEDICINE | Age: 51
End: 2021-01-11

## 2021-01-11 NOTE — TELEPHONE ENCOUNTER
Patient's mom \"Nkechi\" called and would like a call back regarding the mask pushing up on her son's gums. She stated he received a new mask from [facility] and there is still some leakage. She said his gums are bothering him. She can be reached at [phone].

## 2021-01-12 NOTE — TELEPHONE ENCOUNTER
Patience mother Dixon Mary was contacted regarding her call. She stated that her son's gum pain is in the back of the mouth I shared with her that this is not typically conducive of pap therapy. She's going to try positional therapy as well when wearing the mask and getting Mr. Osborn comfortable as he is typically a supine sleeper. Patients mother will follow up with our office if needed.

## 2021-02-05 NOTE — PROGRESS NOTES
HISTORY OF PRESENT ILLNESS  Eliazar Barnes is a 48 y.o. male. HPI     Last visit was 10/21/20 Here for routine care  This is an established visit completed with telemedicine was completed with video assist  the patient acknowledges and agrees to this method of visitation preet  Presents with parents who provide hx      He c/o cough spell/discomfort in his throat   This happens rarely  Discussed he has long hx of this  Discussed seeing ENT - provided referral     BP at home 100-103/70     Wt is 175 lbs per pt - down 2 lbs x lov       132 92, avg 105 avg 87   He takes metformin for DM 500mg daily  He has free style julius     Reviewed labs   Ordered labs     Patient has been talking with Pharm. D. about blood sugar readings as well     the patient has a h/o hlp   On lipitor     On pepcid for gerd--works well   Takes this twice daily no issues  Will give gaviscon for breakthrough sxs     Takes mvi  Take vit b12  Takes allegra for allergies - worse allergies right now      Takes calcium  Takes vitamin d 2000units      Has constantino--he weasrs cpap each night 2/21  He sees Dr. Jeri Mcnulty  Reviewed note 10/29/20: he is compliant with PAP therapy and PAP continues to benefit patient and remains necessary for control of his sleep apnea. CPAP setting - he will continue on his current pressure settings. * We have recommended a dedicated weight loss through appropriate diet and an exercise regimen as significant weight reduction has been shown to reduce severity of obstructive sleep apnea. * mask-fitting evaluation with dme to see if can get better fit. He has lost weight  I have ordered replacement supplies  * He was asked to contact our office for any problems regarding PAP therapy. * Counseling was provided regarding the importance of regular PAP use and on proper sleep hygiene and safe driving.   * Re-enforced proper and regular cleaning for the device.      Sees dr. Candice Hollis ([facility]) for chronic cough from gerd/reflux Last there 5/31/19  Has been using gaviscon which has helped  He is on clemastine for a cough-this has been helping   Reviewed ct chest 10/29/20: sTable chronic lingular changes. No new findings. Has bunions, not operated yet  Sees gretchen, trying to treat foot fungus, got new shoes      Has a h/o rosacea  On metrogel for this     He c/o pain in throat in chest x as long as he can remember was sent to cardiology for this and had evaluation  He saw Dr. Seth Christine (cardio) for CP  Last there 9/30/20   Reviewed stress test 10/14/20 negative     Takes asa at night--has h/o isolated dvt in 2008 no recurrent issues   Was on coumadin until 2018    PREVENTIVE:  Colonoscopy: 5/18 sara 10 yrs, has had egd as well   PSA: 6/20 0.3  AAA screen not needed  Tdap: 10/19  Pneumovax: due reminded   Prev 13 not yet needed  shingrix not yet needed  Flu shot 10/20  Ophtho: 7/20 [facility]  a1c 12/19 8.2 6/20 5.9 11/20 5.9  Lipids 6/20 ldl 58  Micro  11/20    Patient Active Problem List    Diagnosis Date Noted    Type 2 diabetes mellitus without complication, without long-term current use of insulin ([facility] 04/06/2020    SERGIO on CPAP 04/06/2020    Gastroesophageal reflux disease without esophagitis 04/04/2020    Mixed hyperlipidemia 04/04/2020    DVT (deep venous thrombosis) ([facility] 07/17/2014    Down's syndrome      Current Outpatient Medications   Medication Sig Dispense Refill    pneumococcal 23-ulises ps vaccine (PNEUMOVAX 23) 25 mcg/0.5 mL injection 0.5 mL by IntraMUSCular route once for 1 dose. 0.5 mL 0    flash glucose sensor (FreeStyle Lashawn 2 Sensor) kit 1 Each by Does Not Apply route See Ирина Rao. Replace and apply sensor every 14 days. Use to scan sensor at least 3 times daily. 2 Kit 5    atorvastatin (Lipitor) 40 mg tablet Take 1 Tab by mouth nightly.  90 Tab 1    FreeStyle Lashawn 14 Day Johnston misc USE TO SCAN SENSOR AT LEAST THREE TIMES DAILY AS DIRECTED 1 Each 0    glucose blood VI test strips (FreeStyle Precision Aleksandr Strips) strip Check once daily 100 Strip 1    fexofenadine (ALLEGRA) 180 mg tablet Take 180 mg by mouth daily.  cholecalciferol, vitamin D3, (Vitamin D3) 50 mcg (2,000 unit) tab Take  by mouth daily.  aspirin delayed-release 81 mg tablet Take  by mouth daily.  PSEUDOEPHEDRINE-guaiFENesin (Mucinex D)  mg per tablet Take 1 Tab by mouth every twelve (12) hours.  clemastine 2.68 mg tab tablet Take 1 Tab by mouth two (2) times a day. 180 Tab 1    metFORMIN (GLUCOPHAGE) 500 mg tablet Take 1 Tab by mouth daily. 180 Tab 0    flash glucose sensor (FreeStyle Lashawn 14 Day Sensor) kit 1 Each by Does Not Apply route See Ирина Rao. Apply and replace sensor every 14 days. Use to scan at least 3 times daily. E11.9 2 Kit 11    ondansetron hcl (ZOFRAN, AS HYDROCHLORIDE,) 4 mg tablet Take 1 Tab by mouth every eight (8) hours as needed for Nausea. 20 Tab 0    famotidine (PEPCID) 20 mg tablet Take 1 Tab by mouth two (2) times a day. 20 Tab 0    vit B cmplx & C #11-ca-dha-Q10 (BRAIN MIGHT-DHA-CO Q10) 140-10-10 mg Tab Take 1 Tab by mouth daily.  calcium 600 mg cap Take 1 Tab by mouth daily.  Azelastine (ASTEPRO) 0.15 % (205.5 mcg) nasal spray 2 Sprays by Both Nostrils route as needed.  TO-GS-FY-Fe-Min-Lycopen-Lutein (CENTRUM) 0.4-162-18 mg Tab Take 1 Tab by mouth daily.        Past Surgical History:   Procedure Laterality Date    COLONOSCOPY N/A 5/31/2018    [facility]COPY    HX CHOLECYSTECTOMY      HX OTHER SURGICAL      convergent strabismus s/p corrective surgery      Lab Results   Component Value Date/Time    WBC 4.7 06/05/2020 10:11 AM    HGB 13.4 06/05/2020 10:11 AM    HCT 40.0 06/05/2020 10:11 AM    PLATELET 052 15/73/9373 10:11 AM    MCV 88 06/05/2020 10:11 AM     Lab Results   Component Value Date/Time    Cholesterol, total 129 06/05/2020 10:11 AM    HDL Cholesterol 42 06/05/2020 10:11 AM    LDL, calculated 58 06/05/2020 10:11 AM    Triglyceride 144 06/05/2020 10:11 AM     Lab Results   Component Value Date/Time    GFR est non- 11/16/2020 09:40 AM    GFR est  11/16/2020 09:40 AM    Creatinine 0.84 11/16/2020 09:40 AM    BUN 12 11/16/2020 09:40 AM    Sodium 140 11/16/2020 09:40 AM    Potassium 4.6 11/16/2020 09:40 AM    Chloride 98 11/16/2020 09:40 AM    CO2 27 11/16/2020 09:40 AM    Magnesium 2.0 05/22/2014 01:45 PM        Review of Systems   Respiratory: Positive for cough. Negative for shortness of breath. Cardiovascular: Negative for chest pain. Gastrointestinal: Positive for heartburn. Physical Exam  Constitutional:       General: He is not in acute distress. Appearance: Normal appearance. He is not ill-appearing, toxic-appearing or diaphoretic. HENT:      Head: Normocephalic and atraumatic. Eyes:      General:         Right eye: No discharge. Left eye: No discharge. Conjunctiva/sclera: Conjunctivae normal.   Pulmonary:      Effort: No respiratory distress. Neurological:      Mental Status: He is alert and oriented to person, place, and time. Mental status is at baseline. Gait: Gait normal.   Psychiatric:         Mood and Affect: Mood normal.         Behavior: Behavior normal.         ASSESSMENT and PLAN    ICD-10-CM ICD-9-CM    1. Type 2 diabetes mellitus without complication, without long-term current use of insulin (HCC)        Controlled on metformin A1c at goal repeat labs sent to patient house eye exam today we will attempt to get notes again C36.3 399.88 METABOLIC PANEL, COMPREHENSIVE      HEMOGLOBIN A1C WITH EAG      LIPID PANEL   2. SERGIO on CPAP  H53.98 558.69 METABOLIC PANEL, COMPREHENSIVE   Compliant with CPAP Z99.89 V46.8 HEMOGLOBIN A1C WITH EAG      LIPID PANEL   3.  Deep vein thrombosis (DVT) of proximal lower extremity, unspecified chronicity, unspecified laterality (HCC)  H41.3S2 514.11 METABOLIC PANEL, COMPREHENSIVE   History of years ago on daily aspirin to prevent recurrent DVT   HEMOGLOBIN A1C WITH EAG      LIPID PANEL   4. Gastroesophageal reflux disease without esophagitis  C24.7 371.31 METABOLIC PANEL, COMPREHENSIVE   Improved with Pepcid twice daily patient has a long history of recurrent throat pain which he still thinks is bothering him he has antihistamine he takes over-the-counter as well    Discussed ENT for further evaluation place referral       HEMOGLOBIN A1C WITH EAG      LIPID PANEL   5. Mixed hyperlipidemia  O22.6 892.5 METABOLIC PANEL, COMPREHENSIVE   Controlled Lipitor   HEMOGLOBIN A1C WITH EAG      LIPID PANEL   6. Down's syndrome  F05.0 664.3 METABOLIC PANEL, COMPREHENSIVE   Patient is cared for by his parents who are both present during the visit   HEMOGLOBIN A1C WITH EAG      LIPID PANEL   7. Cough  N80 070.3 METABOLIC PANEL, COMPREHENSIVE   Patient with a chronic cough    CT of the chest is up-to-date    Was previously evaluated by pulmonary    Was placed on clemastine which he takes in improves his symptoms continue   HEMOGLOBIN A1C WITH EAG      LIPID PANEL   8. Throat pain       Refer to ENT R07.0 784.1 REFERRAL TO ENT-OTOLARYNGOLOGY           Scribed by Scott Mallory of [address] [place] [address] [address], as dictated by Dr. Cuca Coelho. Current diagnosis and concerns discussed with pt at length. Pt understands risks and benefits or current treatment plan and medications, and accepts the treatment and medication with any possible risks. Pt asks appropriate questions, which were answered. Pt was instructed to call with any concerns or problems. I have reviewed the note documented by the scribe. The services provided are my own. The documentation is accurate     Demetrius Teresoco, who was evaluated through a synchronous (real-time) audio-video encounter, and/or his healthcare decision maker, is aware that it is a billable service, with coverage as determined by his insurance carrier.  He provided verbal consent to proceed: Yes, and patient identification was verified. It was conducted pursuant to the emergency declaration under the [address] [place] [address], [address] [place] [address] and the [employer] Act. A caregiver was present when appropriate. Ability to conduct physical exam was limited. I was at home. The patient was at home.

## 2021-02-08 ENCOUNTER — VIRTUAL VISIT (OUTPATIENT)
Dept: INTERNAL MEDICINE CLINIC | Age: 51
End: 2021-02-08
Payer: MEDICARE

## 2021-02-08 DIAGNOSIS — R05.9 COUGH: ICD-10-CM

## 2021-02-08 DIAGNOSIS — E78.2 MIXED HYPERLIPIDEMIA: ICD-10-CM

## 2021-02-08 DIAGNOSIS — E11.9 TYPE 2 DIABETES MELLITUS WITHOUT COMPLICATION, WITHOUT LONG-TERM CURRENT USE OF INSULIN (HCC): Primary | ICD-10-CM

## 2021-02-08 DIAGNOSIS — G47.33 OSA ON CPAP: ICD-10-CM

## 2021-02-08 DIAGNOSIS — K21.9 GASTROESOPHAGEAL REFLUX DISEASE WITHOUT ESOPHAGITIS: ICD-10-CM

## 2021-02-08 DIAGNOSIS — R07.0 THROAT PAIN: ICD-10-CM

## 2021-02-08 DIAGNOSIS — Z99.89 OSA ON CPAP: ICD-10-CM

## 2021-02-08 DIAGNOSIS — I82.4Y9 DEEP VEIN THROMBOSIS (DVT) OF PROXIMAL LOWER EXTREMITY, UNSPECIFIED CHRONICITY, UNSPECIFIED LATERALITY (HCC): ICD-10-CM

## 2021-02-08 DIAGNOSIS — Q90.9 DOWN'S SYNDROME: ICD-10-CM

## 2021-02-08 PROCEDURE — 99214 OFFICE O/P EST MOD 30 MIN: CPT | Performed by: INTERNAL MEDICINE

## 2021-02-08 PROCEDURE — G8510 SCR DEP NEG, NO PLAN REQD: HCPCS | Performed by: INTERNAL MEDICINE

## 2021-02-08 PROCEDURE — 2022F DILAT RTA XM EVC RTNOPTHY: CPT | Performed by: INTERNAL MEDICINE

## 2021-02-08 PROCEDURE — 3017F COLORECTAL CA SCREEN DOC REV: CPT | Performed by: INTERNAL MEDICINE

## 2021-02-08 PROCEDURE — G8427 DOCREV CUR MEDS BY ELIG CLIN: HCPCS | Performed by: INTERNAL MEDICINE

## 2021-02-08 PROCEDURE — 3046F HEMOGLOBIN A1C LEVEL >9.0%: CPT | Performed by: INTERNAL MEDICINE

## 2021-02-08 NOTE — PATIENT INSTRUCTIONS
Medicare Wellness Visit, Male The best way to live healthy is to have a lifestyle where you eat a well-balanced diet, exercise regularly, limit alcohol use, and quit all forms of tobacco/nicotine, if applicable. Regular preventive services are another way to keep healthy. Preventive services (vaccines, screening tests, monitoring & exams) can help personalize your care plan, which helps you manage your own care. Screening tests can find health problems at the earliest stages, when they are easiest to treat. [facility] follows the current, evidence-based guidelines published by the [facility] ([facility]) when recommending preventive services for our patients. Because we follow these guidelines, sometimes recommendations change over time as research supports it. (For example, a prostate screening blood test is no longer routinely recommended for men with no symptoms). Of course, you and your doctor may decide to screen more often for some diseases, based on your risk and co-morbidities (chronic disease you are already diagnosed with). Preventive services for you include: - Medicare offers their members a free annual wellness visit, which is time for you and your primary care provider to discuss and plan for your preventive service needs. Take advantage of this benefit every year! 
-All adults over age 72 should receive the recommended pneumonia vaccines. Current USPSTF guidelines recommend a series of two vaccines for the best pneumonia protection.  
-All adults should have a flu vaccine yearly and tetanus vaccine every 10 years. 
-All adults age 48 and older should receive the shingles vaccines (series of two vaccines). -All adults age 38-68 who are overweight should have a diabetes screening test once every three years. -Other screening tests & preventive services for persons with diabetes include: an eye exam to screen for diabetic retinopathy, a kidney function test, a foot exam, and stricter control over your cholesterol.  
-Cardiovascular screening for adults with routine risk involves an electrocardiogram (ECG) at intervals determined by the provider.  
-Colorectal cancer screening should be done for adults age 54-65 with no increased risk factors for colorectal cancer. There are a number of acceptable methods of screening for this type of cancer. Each test has its own benefits and drawbacks. Discuss with your provider what is most appropriate for you during your annual wellness visit. The different tests include: colonoscopy (considered the best screening method), a fecal occult blood test, a fecal DNA test, and sigmoidoscopy. 
-All adults born between [facility] should be screened once for Hepatitis C. 
-An Abdominal Aortic Aneurysm (AAA) Screening is recommended for men age 73-68 who has ever smoked in their lifetime. Here is a list of your current Health Maintenance items (your personalized list of preventive services) with a due date: 
Health Maintenance Due Topic Date Due  Pneumococcal Vaccine (1 of 1 - PPSV23) 02/23/1976 [facility] Diabetic Foot Care  02/23/1980 [facility] Eye Exam  02/23/1980  Shingles Vaccine (1 of 2) 02/23/2020

## 2021-02-15 RX ORDER — METFORMIN HYDROCHLORIDE 500 MG/1
500 TABLET ORAL DAILY
Qty: 180 TAB | Refills: 0 | Status: SHIPPED | OUTPATIENT
Start: 2021-02-15 | End: 2021-06-09

## 2021-02-15 RX ORDER — CLEMASTINE FUMARATE 2.68 MG/1
TABLET ORAL
Qty: 180 TAB | Refills: 1 | Status: SHIPPED | OUTPATIENT
Start: 2021-02-15 | End: 2021-08-01

## 2021-02-15 NOTE — TELEPHONE ENCOUNTER
Future Appointments:  Future Appointments   Date Time Provider Nasra Milleri   11/1/2021 11:20 AM Hasmukh Ordaz MD [facility] BS AMB        Last Appointment With Me:  2/8/2021     Requested Prescriptions     Pending Prescriptions Disp Refills    metFORMIN (GLUCOPHAGE) 500 mg tablet 180 Tab 0     Sig: Take 1 Tab by mouth daily.

## 2021-02-15 NOTE — TELEPHONE ENCOUNTER
----- Message from Jaida Garcia sent at 2/15/2021 11:25 AM EST -----  Regarding: Prescription Question  Contact: [phone]  Can you please send a new prescription to [facility] for a 90 day supply of Rogelio's Metformin. Thanks.

## 2021-02-18 LAB
ALBUMIN SERPL-MCNC: 4 G/DL (ref 4–5)
ALBUMIN/GLOB SERPL: 1.3 {RATIO} (ref 1.2–2.2)
ALP SERPL-CCNC: 79 IU/L (ref 39–117)
ALT SERPL-CCNC: 29 IU/L (ref 0–44)
AST SERPL-CCNC: 18 IU/L (ref 0–40)
BILIRUB SERPL-MCNC: 0.7 MG/DL (ref 0–1.2)
BUN SERPL-MCNC: 13 MG/DL (ref 6–24)
BUN/CREAT SERPL: 15 (ref 9–20)
CALCIUM SERPL-MCNC: 9.1 MG/DL (ref 8.7–10.2)
CHLORIDE SERPL-SCNC: 102 MMOL/L (ref 96–106)
CHOLEST SERPL-MCNC: 133 MG/DL (ref 100–199)
CO2 SERPL-SCNC: 27 MMOL/L (ref 20–29)
CREAT SERPL-MCNC: 0.89 MG/DL (ref 0.76–1.27)
EST. AVERAGE GLUCOSE BLD GHB EST-MCNC: 126 MG/DL
GLOBULIN SER CALC-MCNC: 3 G/DL (ref 1.5–4.5)
GLUCOSE SERPL-MCNC: 105 MG/DL (ref 65–99)
HBA1C MFR BLD: 6 % (ref 4.8–5.6)
HDLC SERPL-MCNC: 51 MG/DL
LDLC SERPL CALC-MCNC: 65 MG/DL (ref 0–99)
POTASSIUM SERPL-SCNC: 4.7 MMOL/L (ref 3.5–5.2)
PROT SERPL-MCNC: 7 G/DL (ref 6–8.5)
SODIUM SERPL-SCNC: 142 MMOL/L (ref 134–144)
TRIGL SERPL-MCNC: 90 MG/DL (ref 0–149)
VLDLC SERPL CALC-MCNC: 17 MG/DL (ref 5–40)

## 2021-03-02 RX ORDER — FLASH GLUCOSE SENSOR
1 KIT MISCELLANEOUS SEE ADMIN INSTRUCTIONS
Qty: 2 KIT | Refills: 5 | Status: SHIPPED | OUTPATIENT
Start: 2021-03-02 | End: 2021-07-28

## 2021-03-02 NOTE — TELEPHONE ENCOUNTER
PCP: Mercedez Garcia MD    Last appt: 2021  Future Appointments   Date Time Provider Nasra Mcguire   2021  1:30 PM Mercedez Garcia MD [facility] BS AMB   2021 11:20 AM Ratna Milian MD [facility] BS AMB       Requested Prescriptions     Pending Prescriptions Disp Refills    flash glucose sensor (FreeStyle Lashawn 2 Sensor) kit 2 Kit 5     Si Each by Does Not Apply route See Admin Instructions. Replace and apply sensor every 14 days. Use to scan sensor at least 3 times daily.

## 2021-03-15 ENCOUNTER — TELEPHONE (OUTPATIENT)
Dept: INTERNAL MEDICINE CLINIC | Age: 51
End: 2021-03-15

## 2021-03-15 NOTE — TELEPHONE ENCOUNTER
Pharmacy Progress Note - Telephone Encounter    S/O: Mr. Joellen Santos 46 y.o. male, referred by Dr. Kelsey Love MD, was contacted via an outbound telephone call to discuss Lashawn CGM sensor falling off today. Verified patients identifiers (name & ) per HIPAA policy. Call discuss with patient's father (EC). A/P:  - Discuss contacting [employer] (mfr) regarding sensor malfunction.  - Recommend using skin tac to help increase sensor adhesiveness.   - Patient endorses understanding to the provided information. All questions answered at this time. There are no discontinued medications. No orders of the defined types were placed in this encounter.     Thank you,  Naya Dutton, PharmD, BCACP, CDCES      CLINICAL PHARMACY CONSULT: MED RECONCILIATION/REVIEW ADDENDUM    For Pharmacy Admin Tracking Only    PHSO: PHSO Patient?: No    Time Spent (min): 5

## 2021-04-09 ENCOUNTER — OFFICE VISIT (OUTPATIENT)
Dept: URGENT CARE | Age: 51
End: 2021-04-09
Payer: MEDICARE

## 2021-04-09 VITALS — RESPIRATION RATE: 17 BRPM | OXYGEN SATURATION: 98 % | HEART RATE: 61 BPM | TEMPERATURE: 98.2 F

## 2021-04-09 DIAGNOSIS — R05.9 COUGH: ICD-10-CM

## 2021-04-09 DIAGNOSIS — K21.9 GASTROESOPHAGEAL REFLUX DISEASE WITHOUT ESOPHAGITIS: Primary | ICD-10-CM

## 2021-04-09 PROCEDURE — 99214 OFFICE O/P EST MOD 30 MIN: CPT | Performed by: NURSE PRACTITIONER

## 2021-04-09 RX ORDER — FAMOTIDINE 20 MG/1
20 TABLET, FILM COATED ORAL
Qty: 60 TAB | Refills: 0 | Status: SHIPPED | OUTPATIENT
Start: 2021-04-09 | End: 2021-05-12 | Stop reason: SDUPTHER

## 2021-04-09 RX ORDER — PANTOPRAZOLE SODIUM 40 MG/1
40 TABLET, DELAYED RELEASE ORAL DAILY
Qty: 30 TAB | Refills: 0 | Status: SHIPPED | OUTPATIENT
Start: 2021-04-09 | End: 2021-07-19 | Stop reason: SDUPTHER

## 2021-04-09 NOTE — PROGRESS NOTES
This patient was seen at [address] [place] [address] Urgent Care while in their vehicle due to COVID-19 pandemic with PPE and focused examination in order to decrease community viral transmission. The patient/guardian gave verbal consent to treat. Win Urias is a 46 y.o. male who presents for evaluation of cough x 1 week. Mother reports started with cough in morning and after eating for the past week, certain foods seem to worsen symptoms. No noted shortness of breath, wheezing or productive cough. Symptoms seem to resolve 1-2 hours after eating. History of GERD and they try to reduce trigger foods, has been off all medications for \"a while. \" No change in appetite or behavior. Patient unable to provide history due to underlying cognitive disorder. Denies any symptoms congestion, HA, v/d, fever etc. No known exposure to COVID or sick contacts. No other complaints or concerns at this time. PMH: Down's Syndrome. Non-smoker. The history is provided by the patient.         Past Medical History:   Diagnosis Date    Arthritis     scoliosis    Down's syndrome     [facility]    Gastrointestinal disorder     GERD, Robin's Esophagus    Other ill-defined conditions(799.89)     Down Syndrom with mild mental retardation    Other ill-defined conditions(799.89)     mixed hyperlipidemia/hypercholesteremia    Other ill-defined conditions(799.89)     myopia    Other ill-defined conditions(799.89)     sleep apnea, uses cpap at night    Sleep apnea     uses cpap    Thromboembolus [facility])         Past Surgical History:   Procedure Laterality Date    COLONOSCOPY N/A 5/31/2018    [facility]q [facility] ENDOSCOPY    HX CHOLECYSTECTOMY      HX OTHER SURGICAL      convergent strabismus s/p corrective surgery         Family History   Problem Relation Age of Onset    Hypertension Mother     Diabetes Mother     Asthma Father     Diabetes Father     Asthma Sister     Bleeding Prob Maternal Grandfather     Arthritis-osteo Paternal Grandmother     Alcohol abuse Paternal Grandmother     Lung Disease Paternal Grandmother     Hypertension Paternal Grandmother     Alcohol abuse Paternal Grandfather     Heart Disease Paternal Grandfather         Social History     Socioeconomic History    Marital status: SINGLE     Spouse name: Not on file    Number of children: Not on file    Years of education: Not on file    Highest education level: Not on file   Occupational History    Not on file   Social Needs    Financial resource strain: Not on file    Food insecurity     Worry: Not on file     Inability: Not on file   [employer] needs     Medical: Not on file     Non-medical: Not on file   Tobacco Use    Smoking status: Never Smoker    Smokeless tobacco: Never Used   Substance and Sexual Activity    Alcohol use: No     Alcohol/week: 0.0 standard drinks    Drug use: No    Sexual activity: Not on file   Lifestyle    Physical activity     Days per week: Not on file     Minutes per session: Not on file    Stress: Not on file   Relationships    Social connections     Talks on phone: Not on file     Gets together: Not on file     Attends Latter day service: Not on file     Active member of club or organization: Not on file     Attends meetings of clubs or organizations: Not on file     Relationship status: Not on file    Intimate partner violence     Fear of current or ex partner: Not on file     Emotionally abused: Not on file     Physically abused: Not on file     Forced sexual activity: Not on file   Other Topics Concern    Not on file   Social History Narrative    Not on file                ALLERGIES: Patient has no known allergies. Review of Systems   Constitutional: Negative for activity change, appetite change, chills, diaphoresis, fatigue and fever.    HENT: Negative for congestion, ear pain, postnasal drip, rhinorrhea, sinus pressure, sinus pain and sore throat. Respiratory: Positive for cough. Negative for chest tightness, shortness of breath and wheezing. Cardiovascular: Negative for chest pain. Gastrointestinal: Negative for abdominal pain, diarrhea and vomiting. Skin: Negative for rash. Neurological: Negative for dizziness, light-headedness and headaches. Vitals:    04/09/21 1034   Pulse: 61   Resp: 17   Temp: 98.2 °F (36.8 °C)   SpO2: 98%       Physical Exam  Vitals signs and nursing note reviewed. Constitutional:       General: He is not in acute distress. Appearance: Normal appearance. He is not ill-appearing. HENT:      Head: Normocephalic and atraumatic. Eyes:      Conjunctiva/sclera: Conjunctivae normal.      Pupils: Pupils are equal, round, and reactive to light. Neck:      Musculoskeletal: Normal range of motion and neck supple. Cardiovascular:      Rate and Rhythm: Normal rate and regular rhythm. Heart sounds: Normal heart sounds. No murmur. Pulmonary:      Effort: Pulmonary effort is normal.      Breath sounds: Normal breath sounds. No wheezing or rhonchi. Musculoskeletal: Normal range of motion. Skin:     General: Skin is warm and dry. Findings: No rash. Neurological:      Mental Status: He is alert and oriented to person, place, and time. Psychiatric:         Mood and Affect: Mood normal.         Thought Content: Thought content normal.         MDM    Procedures      ICD-10-CM ICD-9-CM   1. Gastroesophageal reflux disease without esophagitis  K21.9 530.81   2. Cough  R05 786.2       Orders Placed This Encounter    pantoprazole (PROTONIX) 40 mg tablet     Sig: Take 1 Tab by mouth daily. Dispense:  30 Tab     Refill:  0    famotidine (PEPCID) 20 mg tablet     Sig: Take 1 Tab by mouth two (2) times daily as needed for Heartburn, Gastroesophageal Reflux Disease (GERD) or Indigestion.      Dispense:  60 Tab     Refill:  0      Will restart patient on PPI daily and H2 blocker as needed twice daily.   Encouraged to monitor intake for trigger foods and avoid as much as possible. Can start daily antihistamine as needed for potential seasonal allergies- allow 1-2 weeks for symptom relief. If no improvement would recommend follow up with PCP for further evaluation and treatment. Declined interest in COVID testing today due to acuity of symptoms and lack of exposure. The patient is to follow up with PCP PRN. If signs and symptoms become worse the pt is to go to the ER.      Signed By: Guillermina Rodgers NP     April 9, 2021

## 2021-05-10 RX ORDER — ATORVASTATIN CALCIUM 40 MG/1
TABLET, FILM COATED ORAL
Qty: 90 TAB | Refills: 1 | Status: SHIPPED | OUTPATIENT
Start: 2021-05-10 | End: 2021-11-02

## 2021-05-12 RX ORDER — FAMOTIDINE 20 MG/1
20 TABLET, FILM COATED ORAL
Qty: 180 TAB | Refills: 1 | Status: SHIPPED | OUTPATIENT
Start: 2021-05-12 | End: 2021-08-30

## 2021-05-12 NOTE — TELEPHONE ENCOUNTER
PCP: Janice Friedman MD    Last appt: 2/8/2021  Future Appointments   Date Time Provider Nasra Mcguire   5/25/2021  1:30 PM Janice Friedman MD [facility] BS COBY   11/1/2021 11:20 AM Angelina Ann MD [facility] BS AMB       Requested Prescriptions     Pending Prescriptions Disp Refills    famotidine (PEPCID) 20 mg tablet 180 Tab 1     Sig: Take 1 Tab by mouth two (2) times daily as needed for Heartburn, Gastroesophageal Reflux Disease (GERD) or Indigestion.

## 2021-05-18 ENCOUNTER — TELEPHONE (OUTPATIENT)
Dept: INTERNAL MEDICINE CLINIC | Age: 51
End: 2021-05-18

## 2021-05-18 NOTE — TELEPHONE ENCOUNTER
----- Message from Antonia Tan sent at 5/18/2021 10:21 AM EDT -----  Regarding: Dr. Myrtle Landaverde  General Message/Vendor Calls    Caller's first and last name: Pt       Reason for call: Needs lab orders sent via [email or web address] or email. Lost previous copies. Callback required yes/no and why: yes, to confirm.        Best contact number(s):  [phone]      Details to clarify the request: N/A    Message from [facility]

## 2021-05-24 NOTE — PROGRESS NOTES
HISTORY OF PRESENT ILLNESS  Ania Hammond is a 46 y.o. male.   HPI     Last visit was 2/8/21 Here for routine care  Presents with mom who provide hx       BP today is 101/59     Wt was 175 lbs lov     Doesn't remember readings  He takes metformin for DM 500mg daily  He has free style julius     Reviewed labs   Will get labs today        the patient has a h/o hlp   On lipitor     On pepcid for gerd--works well   Takes this twice daily no issues    Takes mvi  Take vit b12  Takes allegra for [place] [employer]- worse allergies right now      Takes calcium  Takes vitamin d 2000units      Has constantino--he weasrs cpap each night 5/21  He sees Dr. Manuel Cochran  Last there  10/29/20       Sees dr. Erin Harper) for chronic cough from gerd/reflux   Last there 5/31/19  Has been using gaviscon which has helped  He is on clemastine BID for a cough   Last ct chest 10/20: stable changes   Provided referral to luda Scott) and had ear cleaning  Was given fungal cream for his nose   Has lipoma on R side neck, he is following this, had us 2/19    Has bunions, not operated yet  Sees gretchen, trying to treat foot fungus, got new shoes      Has a h/o rosacea  On metrogel for this     He c/o pain in throat in chest x as long as he can remember was sent to cardiology for this and had evaluation  He saw Dr. Aurora Polk (cardio) for CP  Last there 9/30/20      Takes asa at night--has h/o isolated dvt in 2008 no recurrent issues   Was on coumadin until 2018     Pt lives with parents    Pt is not functionally independent      PREVENTIVE:  Colonoscopy: 5/18 sara 10 yrs, has had egd as well   PSA: 6/20 0.3  AAA screen not needed  Tdap: 10/19  Pneumovax: 2/21  Prev 13 not yet needed  Shingrix: discussed, ordered prescription   Flu shot 10/20  Ophtho: 7/20 amanda tower  a1c 12/19 8.2 6/20 5.9 11/20 5.9 2/21 6.0  Lipids 2/21  ldl 65  Micro  11/20  Covid: both (moderna)    Patient Active Problem List    Diagnosis Date Noted    Type 2 diabetes mellitus without complication, without long-term current use of insulin ([facility] [address] 04/06/2020    SERGIO on CPAP 04/06/2020    Gastroesophageal reflux disease without esophagitis 04/04/2020    Mixed hyperlipidemia 04/04/2020    DVT [facility]deep venous thrombosis) ([facility]) 07/17/2014    Down's syndrome      Current Outpatient Medications   Medication Sig Dispense Refill    varicella-zoster recombinant, PF, (Shingrix, PF,) 50 mcg/0.5 mL susr injection 0.5mL by IntraMUSCular route once now and then repeat in 2-6 months 0.5 mL 1    famotidine (PEPCID) 20 mg tablet Take 1 Tab by mouth two (2) times daily as needed for Heartburn, Gastroesophageal Reflux Disease (GERD) or Indigestion. 180 Tab 1    atorvastatin (LIPITOR) 40 mg tablet TAKE 1 TABLET NIGHTLY 90 Tab 1    pantoprazole (PROTONIX) 40 mg tablet Take 1 Tab by mouth daily. 30 Tab 0    flash glucose sensor (FreeStyle Lashawn 2 Sensor) kit 1 Each by Does Not Apply route See Ирина Rao. Replace and apply sensor every 14 days. Use to scan sensor at least 3 times daily. 2 Kit 5    clemastine 2.68 mg tab tablet TAKE 1 TABLET TWICE A  Tab 1    metFORMIN (GLUCOPHAGE) 500 mg tablet Take 1 Tab by mouth daily. 180 Tab 0    FreeStyle Lashawn 14 Day [place] misc USE TO SCAN SENSOR AT LEAST THREE TIMES DAILY AS DIRECTED 1 Each 0    glucose blood VI test strips (FreeStyle Precision Aleksandr Strips) strip Check once daily 100 Strip 1    fexofenadine (ALLEGRA) 180 mg tablet Take 180 mg by mouth daily.  cholecalciferol, vitamin D3, (Vitamin D3) 50 mcg (2,000 unit) tab Take  by mouth daily.  aspirin delayed-release 81 mg tablet Take  by mouth daily.  PSEUDOEPHEDRINE-guaiFENesin (Mucinex D)  mg per tablet Take 1 Tab by mouth every twelve (12) hours.  ondansetron hcl (ZOFRAN, AS HYDROCHLORIDE,) 4 mg tablet Take 1 Tab by mouth every eight (8) hours as needed for Nausea.  20 Tab 0    ET-LL-YG-Fe-Min-Lycopen-Lutein (CENTRUM) 0.4-162-18 mg Tab Take 1 Tab by mouth daily.      vit B cmplx & C #11-ca-dha-Q10 (BRAIN MIGHT-DHA-CO Q10) 140-10-10 mg Tab Take 1 Tab by mouth daily.  calcium 600 mg cap Take 1 Tab by mouth daily.  Azelastine (ASTEPRO) 0.15 % (205.5 mcg) nasal spray 2 Sprays by Both Nostrils route as needed. Past Surgical History:   Procedure Laterality Date    COLONOSCOPY N/A 5/31/2018    COL[facility]COPY performed [facility] Ken [facility] at [facility] ENDOSCOPY    HX CHOLECYSTECTOMY      HX OTHER SURGICAL      convergent strabismus s/p corrective surgery      Lab Results   Component Value Date/Time    WBC 4.7 06/05/2020 10:11 AM    HGB 13.4 06/05/2020 10:11 AM    HCT 40.0 06/05/2020 10:11 AM    PLATELET 578 36/92/0054 10:11 AM    MCV 88 06/05/2020 10:11 AM     Lab Results   Component Value Date/Time    Cholesterol, total 133 02/17/2021 09:34 AM    HDL Cholesterol 51 02/17/2021 09:34 AM    LDL, calculated 65 02/17/2021 09:34 AM    LDL, calculated 58 06/05/2020 10:11 AM    Triglyceride 90 02/17/2021 09:34 AM     Lab Results   Component Value Date/Time    GFR est non- 02/17/2021 09:34 AM    GFR est  02/17/2021 09:34 AM    Creatinine 0.89 02/17/2021 09:34 AM    BUN 13 02/17/2021 09:34 AM    Sodium 142 02/17/2021 09:34 AM    Potassium 4.7 02/17/2021 09:34 AM    Chloride 102 02/17/2021 09:34 AM    CO2 27 02/17/2021 09:34 AM    Magnesium 2.0 05/22/2014 01:45 PM        Review of Systems   Respiratory: Negative for shortness of breath. Cardiovascular: Negative for chest pain. Physical Exam  Constitutional:       General: He is not in acute distress. Appearance: Normal appearance. He is not ill-appearing, toxic-appearing or diaphoretic. HENT:      Head: Normocephalic and atraumatic. Right Ear: External ear normal.      Left Ear: External ear normal.   Eyes:      General:         Right eye: No discharge. Left eye: No discharge. Conjunctiva/sclera: Conjunctivae normal.      Pupils: Pupils are equal, round, and reactive to light. Neck:      Comments: Soft, marble sized lump on R side neck  Cardiovascular:      Rate and Rhythm: Normal rate and regular rhythm. Pulses: Normal pulses. Heart sounds: Normal heart sounds. No murmur heard. No friction rub. No gallop. Pulmonary:      Effort: No respiratory distress. Breath sounds: Normal breath sounds. No wheezing or rales. Chest:      Chest wall: No tenderness. Abdominal:      General: Abdomen is flat. There is no distension. Palpations: Abdomen is soft. There is no mass. Tenderness: There is no abdominal tenderness. There is no guarding or rebound. Hernia: No hernia is present. Musculoskeletal:         General: Normal range of motion. Cervical back: Normal range of motion and neck supple. Right lower leg: No edema. Left lower leg: Edema present. Comments: L leg chronically larger than right   Skin:     General: Skin is warm and dry. Neurological:      Mental Status: He is alert and oriented to person, place, and time. Mental status is at baseline. Coordination: Coordination normal.      Gait: Gait normal.   Psychiatric:         Mood and Affect: Mood normal.         Behavior: Behavior normal.         ASSESSMENT and PLAN    ICD-10-CM ICD-9-CM    1. Type 2 diabetes mellitus without complication, without long-term current use of insulin (HCC)          E11.9 250.00 HEPATITIS C AB   Has been well controlled on Metformin no recent blood sugars to review we will check A1c adjust medication further as needed currently takes 500 mg daily   METABOLIC PANEL, COMPREHENSIVE      CBC W/O DIFF      TSH 3RD GENERATION      HEMOGLOBIN A1C WITH EAG      PSA SCREENING (SCREENING)   2. Down's syndrome  Q90.9 758.0 HEPATITIS C AB      METABOLIC PANEL, COMPREHENSIVE   Patient is cared for by his parents lives with them   CBC W/O DIFF      TSH 3RD GENERATION      HEMOGLOBIN A1C WITH EAG      PSA SCREENING (SCREENING)   3.  Deep vein thrombosis (DVT) of proximal lower extremity, unspecified chronicity, unspecified laterality ([facility] [address]  I82.4Y9 453.41 HEPATITIS C AB      METABOLIC PANEL, COMPREHENSIVE   Remote history of DVT in the past no recent issues no longer on blood thinners   CBC W/O DIFF      TSH 3RD GENERATION      HEMOGLOBIN A1C WITH EAG      PSA SCREENING (SCREENING)   4. Gastroesophageal reflux disease without esophagitis  K21.9 530.81 HEPATITIS C AB      METABOLIC PANEL, COMPREHENSIVE   Controlled with Pepcid continue   CBC W/O DIFF      TSH 3RD GENERATION      HEMOGLOBIN A1C WITH EAG      PSA SCREENING (SCREENING)   5. Mixed hyperlipidemia  E78.2 272.2 HEPATITIS C AB      METABOLIC PANEL, COMPREHENSIVE   Controlled Lipitor monitor lipids adjust medication further as needed   CBC W/O DIFF      TSH 3RD GENERATION      HEMOGLOBIN A1C WITH EAG      PSA SCREENING (SCREENING)   6. SERGIO on CPAP  G47.33 327.23 HEPATITIS C AB    N31.08 Q04.6 METABOLIC PANEL, COMPREHENSIVE   Compliant with CPAP for the most part   CBC W/O DIFF      TSH 3RD GENERATION      HEMOGLOBIN A1C WITH EAG      PSA SCREENING (SCREENING)   7. Medicare annual wellness visit, subsequent  Z00.00 V70.0 HEPATITIS C AB      METABOLIC PANEL, COMPREHENSIVE      CBC W/O DIFF      TSH 3RD GENERATION      HEMOGLOBIN A1C WITH EAG      PSA SCREENING (SCREENING)   8. Benign prostatic hyperplasia without lower urinary tract symptoms       Check PSA     N40.0 600.00 PSA SCREENING (SCREENING)   9. Lipoma of neck       Benign on right side of his neck had ultrasound and CT scan consistent with lipoma     D17.0 214.1     19--chronic cough control with, Óscar Maldonado saw pulmonary in the past was supposed to follow-up has not scheduled follow-up had a CT scan in October showing stable changes          Depression screen reviewed and negative      Scribed by Wilner Avina, as dictated by Dr. Yung Salazar. Current diagnosis and concerns discussed with pt at length.  Pt understands risks and benefits or current treatment plan and medications, and accepts the treatment and medication with any possible risks. Pt asks appropriate questions, which were answered. Pt was instructed to call with any concerns or problems. I have reviewed the note documented by the scribe. The services provided are my own.   The documentation is accurate

## 2021-05-25 ENCOUNTER — OFFICE VISIT (OUTPATIENT)
Dept: INTERNAL MEDICINE CLINIC | Age: 51
End: 2021-05-25
Payer: MEDICARE

## 2021-05-25 VITALS
OXYGEN SATURATION: 98 % | HEART RATE: 73 BPM | HEIGHT: 65 IN | TEMPERATURE: 97.9 F | BODY MASS INDEX: 29.45 KG/M2 | SYSTOLIC BLOOD PRESSURE: 101 MMHG | RESPIRATION RATE: 16 BRPM | DIASTOLIC BLOOD PRESSURE: 59 MMHG

## 2021-05-25 DIAGNOSIS — Z00.00 MEDICARE ANNUAL WELLNESS VISIT, SUBSEQUENT: ICD-10-CM

## 2021-05-25 DIAGNOSIS — Q90.9 DOWN'S SYNDROME: ICD-10-CM

## 2021-05-25 DIAGNOSIS — Z99.89 OSA ON CPAP: ICD-10-CM

## 2021-05-25 DIAGNOSIS — E78.2 MIXED HYPERLIPIDEMIA: ICD-10-CM

## 2021-05-25 DIAGNOSIS — K21.9 GASTROESOPHAGEAL REFLUX DISEASE WITHOUT ESOPHAGITIS: ICD-10-CM

## 2021-05-25 DIAGNOSIS — I82.4Y9 DEEP VEIN THROMBOSIS (DVT) OF PROXIMAL LOWER EXTREMITY, UNSPECIFIED CHRONICITY, UNSPECIFIED LATERALITY (HCC): ICD-10-CM

## 2021-05-25 DIAGNOSIS — E11.9 TYPE 2 DIABETES MELLITUS WITHOUT COMPLICATION, WITHOUT LONG-TERM CURRENT USE OF INSULIN (HCC): Primary | ICD-10-CM

## 2021-05-25 DIAGNOSIS — N40.0 BENIGN PROSTATIC HYPERPLASIA WITHOUT LOWER URINARY TRACT SYMPTOMS: ICD-10-CM

## 2021-05-25 DIAGNOSIS — G47.33 OSA ON CPAP: ICD-10-CM

## 2021-05-25 DIAGNOSIS — D17.0 LIPOMA OF NECK: ICD-10-CM

## 2021-05-25 PROCEDURE — G8510 SCR DEP NEG, NO PLAN REQD: HCPCS | Performed by: INTERNAL MEDICINE

## 2021-05-25 PROCEDURE — 3017F COLORECTAL CA SCREEN DOC REV: CPT | Performed by: INTERNAL MEDICINE

## 2021-05-25 PROCEDURE — G8427 DOCREV CUR MEDS BY ELIG CLIN: HCPCS | Performed by: INTERNAL MEDICINE

## 2021-05-25 PROCEDURE — 99214 OFFICE O/P EST MOD 30 MIN: CPT | Performed by: INTERNAL MEDICINE

## 2021-05-25 PROCEDURE — 2022F DILAT RTA XM EVC RTNOPTHY: CPT | Performed by: INTERNAL MEDICINE

## 2021-05-25 PROCEDURE — 3044F HG A1C LEVEL LT 7.0%: CPT | Performed by: INTERNAL MEDICINE

## 2021-05-25 PROCEDURE — G8419 CALC BMI OUT NRM PARAM NOF/U: HCPCS | Performed by: INTERNAL MEDICINE

## 2021-05-25 PROCEDURE — G0439 PPPS, SUBSEQ VISIT: HCPCS | Performed by: INTERNAL MEDICINE

## 2021-05-25 RX ORDER — ZOSTER VACCINE RECOMBINANT, ADJUVANTED 50 MCG/0.5
KIT INTRAMUSCULAR
Qty: 0.5 ML | Refills: 1 | Status: SHIPPED | OUTPATIENT
Start: 2021-05-25

## 2021-05-25 NOTE — PATIENT INSTRUCTIONS
Medicare Wellness Visit, Male The best way to live healthy is to have a lifestyle where you eat a well-balanced diet, exercise regularly, limit alcohol use, and quit all forms of tobacco/nicotine, if applicable. Regular preventive services are another way to keep healthy. Preventive services (vaccines, screening tests, monitoring & exams) can help personalize your care plan, which helps you manage your own care. Screening tests can find health problems at the earliest stages, when they are easiest to treat. [facility] follows the current, evidence-based guidelines published by the [facility] ([facility]) when recommending preventive services for our patients. Because we follow these guidelines, sometimes recommendations change over time as research supports it. (For example, a prostate screening blood test is no longer routinely recommended for men with no symptoms). Of course, you and your doctor may decide to screen more often for some diseases, based on your risk and co-morbidities (chronic disease you are already diagnosed with). Preventive services for you include: - Medicare offers their members a free annual wellness visit, which is time for you and your primary care provider to discuss and plan for your preventive service needs. Take advantage of this benefit every year! 
-All adults over age 72 should receive the recommended pneumonia vaccines. Current USPSTF guidelines recommend a series of two vaccines for the best pneumonia protection.  
-All adults should have a flu vaccine yearly and tetanus vaccine every 10 years. 
-All adults age 48 and older should receive the shingles vaccines (series of two vaccines).       
-All adults age 38-68 who are overweight should have a diabetes screening test once every three years.  
-Other screening tests & preventive services for persons with diabetes include: an eye exam to screen for diabetic retinopathy, a kidney function test, a foot exam, and stricter control over your cholesterol.  
-Cardiovascular screening for adults with routine risk involves an electrocardiogram (ECG) at intervals determined by the provider.  
-Colorectal cancer screening should be done for adults age 54-65 with no increased risk factors for colorectal cancer. There are a number of acceptable methods of screening for this type of cancer. Each test has its own benefits and drawbacks. Discuss with your provider what is most appropriate for you during your annual wellness visit. The different tests include: colonoscopy (considered the best screening method), a fecal occult blood test, a fecal DNA test, and sigmoidoscopy. 
-All adults born between [facility] should be screened once for Hepatitis C. 
-An Abdominal Aortic Aneurysm (AAA) Screening is recommended for men age 73-68 who has ever smoked in their lifetime. Here is a list of your current Health Maintenance items (your personalized list of preventive services) with a due date: 
Health Maintenance Due Topic Date Due  
 Hepatitis C Test  Never done  Diabetic Foot Care  Never done  Eye Exam  Never done  Shingles Vaccine (1 of 2) Never done Jessica Alvarez Annual Well Visit  04/07/2021

## 2021-05-25 NOTE — PROGRESS NOTES
This is the Subsequent Medicare Annual Wellness Exam, performed 12 months or more after the Initial AWV or the last Subsequent AWV    I have reviewed the patient's medical history in detail and updated the computerized patient record. Assessment/Plan   Education and counseling provided:  Are appropriate based on today's review and evaluation    1. Type 2 diabetes mellitus without complication, without long-term current use of insulin ([facility] [address]  2. Down's syndrome  3. Deep vein thrombosis (DVT) of proximal lower extremity, unspecified chronicity, unspecified laterality ([address]  4. Gastroesophageal reflux disease without esophagitis  5. Mixed hyperlipidemia  6. SERGIO on CPAP  7. Medicare annual wellness visit, subsequent       Depression Risk Factor Screening     3 most recent PHQ Screens 5/25/2021   Little interest or pleasure in doing things Not at all   Feeling down, depressed, irritable, or hopeless Not at all   Total Score PHQ 2 0       Alcohol Risk Screen    Do you average more than 2 drinks per night or 14 drinks a week: No    On any one occasion in the past three months have you have had more than 4 drinks containing alcohol:  No        Functional Ability and Level of Safety    Hearing: Hearing is good. saw dr dong and had ears cleaned      Activities of Daily Living: The home contains: no safety equipment. Patient needs help with:  transportation, shopping, preparing meals, laundry, housework, managing medications and managing money      Ambulation: with no difficulty     Fall Risk:  Fall Risk Assessment, last 12 mths 4/6/2020   Able to walk? Yes   Fall in past 12 months?  No   Fall screen completed and negative no falls   Abuse Screen:  Patient is not abused     Lives with mother  Cognitive Screening    Has your family/caregiver stated any concerns about your memory: yes - developmental delays     Cognitive Screening: developpmental delays    Health Maintenance Due     Health Maintenance Due   Topic Date Due    Hepatitis C Screening  Never done    Foot Exam Q1  Never done    Eye Exam Retinal or Dilated  Never done    Shingrix Vaccine Age 50> (1 of 2) Never done    Medicare Yearly Exam  04/07/2021       Patient Care Team   Patient Care Team:  Gerhardt Coles, MD as PCP - General (Internal Medicine)  Gerhardt Coles, MD [facility] P[facility]  Bryan Vazquez MD as Physician (Sleep Medicine)  Tameka Bernal MD (Pulmonary Disease)  Rodrigo Morales OD (Optometry)  Theo Emmanuel DPM (Podiatry)  Cathy Schaefer MD as Physician (Cardiology)    History     Patient Active Problem List   Diagnosis Code    Down's syndrome Q90.9    DVT (deep venous thrombosis) ([facility] [address] I82.409    Gastroesophageal reflux disease without esophagitis K21.9    Mixed hyperlipidemia E78.2    Type 2 diabetes mellitus without complication, without long-term current use of insulin ([facility] [address] E11.9    SERGIO on CPAP G47.33, Z99.89     Past Medical History:   Diagnosis Date    Arthritis     scoliosis    Down's syndrome     DVT (deep venous thrombosis) ([facility] [address] 7/17/2014    Gastrointestinal disorder     GERD, Robin's Esophagus    Other ill-defined conditions(799.89)     Down Syndrom with mild mental retardation    Other ill-defined conditions(799.89)     mixed hyperlipidemia/hypercholesteremia    Other ill-defined conditions(799.89)     myopia    Other ill-defined conditions(799.89)     sleep apnea, uses cpap at night    Sleep apnea     uses cpap    Thromboembolus [facility])       Past Surgical History:   Procedure Laterality Date    COLONOSCOPY N/A 5/31/2018    [facility] ENDOSCOPY    HX CHOLECYSTECTOMY      HX OTHER SURGICAL      convergent strabismus s/p corrective surgery     Current Outpatient Medications   Medication Sig Dispense Refill    famotidine (PEPCID) 20 mg tablet Take 1 Tab by mouth two (2) times daily as needed for Heartburn, Gastroesophageal Reflux Disease (GERD) or Indigestion. 180 Tab 1    atorvastatin (LIPITOR) 40 mg tablet TAKE 1 TABLET NIGHTLY 90 Tab 1    pantoprazole (PROTONIX) 40 mg tablet Take 1 Tab by mouth daily. 30 Tab 0    flash glucose sensor (FreeStyle Lashawn 2 Sensor) kit 1 Each by Does Not Apply route See Ирина Rao. Replace and apply sensor every 14 days. Use to scan sensor at least 3 times daily. 2 Kit 5    clemastine 2.68 mg tab tablet TAKE 1 TABLET TWICE A  Tab 1    metFORMIN (GLUCOPHAGE) 500 mg tablet Take 1 Tab by mouth daily. 180 Tab 0    FreeStyle Lashawn 14 Day [place] misc USE TO SCAN SENSOR AT LEAST THREE TIMES DAILY AS DIRECTED 1 Each 0    glucose blood VI test strips (FreeStyle Precision Aleksandr Strips) strip Check once daily 100 Strip 1    fexofenadine (ALLEGRA) 180 mg tablet Take 180 mg by mouth daily.  cholecalciferol, vitamin D3, (Vitamin D3) 50 mcg (2,000 unit) tab Take  by mouth daily.  aspirin delayed-release 81 mg tablet Take  by mouth daily.  PSEUDOEPHEDRINE-guaiFENesin (Mucinex D)  mg per tablet Take 1 Tab by mouth every twelve (12) hours.  ondansetron hcl (ZOFRAN, AS HYDROCHLORIDE,) 4 mg tablet Take 1 Tab by mouth every eight (8) hours as needed for Nausea. 20 Tab 0    FA-WF-CM-Fe-Min-Lycopen-Lutein (CENTRUM) 0.4-162-18 mg Tab Take 1 Tab by mouth daily.  vit B cmplx & C #11-ca-dha-Q10 (BRAIN MIGHT-DHA-CO Q10) 140-10-10 mg Tab Take 1 Tab by mouth daily.  calcium 600 mg cap Take 1 Tab by mouth daily.  Azelastine (ASTEPRO) 0.15 % (205.5 mcg) nasal spray 2 Sprays by Both Nostrils route as needed.        No Known Allergies    Family History   Problem Relation Age of Onset    Hypertension Mother     Diabetes Mother    Lisa Salazar Asthma Father     Diabetes Father     Asthma Sister     Bleeding Prob Maternal Grandfather     Arthritis-osteo Paternal Grandmother     Alcohol abuse Paternal Grandmother     Lung Disease Paternal Grandmother     Hypertension Paternal Grandmother    Lisa Salazar Alcohol abuse Paternal Grandfather     Heart Disease Paternal Grandfather      Social History     Tobacco Use    Smoking status: Never Smoker    Smokeless tobacco: Never Used   Substance Use Topics    Alcohol use: No     Alcohol/week: 0.0 standard drinks       Colonoscopy: 5/18 sara 10 yrs, has had egd as well   PSA: 6/20 0.3 annual  AAA screen not needed    Tdap: 10/19  Pneumovax: due reminded   Prev 13 not yet needed  shingrix not yet needed  Flu shot 10/20    Ophtho: 7/20 amanda tower annual    a1c  2/21 6.0 due now  Lipids 2/21  ldl 65 annual    Covid: both (moderna)    Medication reconciliation completed by MA and reviewed by me. Medical/surgical/social/family history reviewed and updated by me. Patient provided AVS and preventative screening table. Patient verbalized understanding of all information discussed.       Neetu Phelan MD

## 2021-05-26 LAB
ALBUMIN SERPL-MCNC: 3.6 G/DL (ref 3.5–5)
ALBUMIN/GLOB SERPL: 1 {RATIO} (ref 1.1–2.2)
ALP SERPL-CCNC: 76 U/L (ref 45–117)
ALT SERPL-CCNC: 50 U/L (ref 12–78)
ANION GAP SERPL CALC-SCNC: 5 MMOL/L (ref 5–15)
AST SERPL-CCNC: 27 U/L (ref 15–37)
BILIRUB SERPL-MCNC: 0.7 MG/DL (ref 0.2–1)
BUN SERPL-MCNC: 13 MG/DL (ref 6–20)
BUN/CREAT SERPL: 16 (ref 12–20)
CALCIUM SERPL-MCNC: 9.1 MG/DL (ref 8.5–10.1)
CHLORIDE SERPL-SCNC: 104 MMOL/L (ref 97–108)
CO2 SERPL-SCNC: 31 MMOL/L (ref 21–32)
CREAT SERPL-MCNC: 0.79 MG/DL (ref 0.7–1.3)
ERYTHROCYTE [DISTWIDTH] IN BLOOD BY AUTOMATED COUNT: 15.1 % (ref 11.5–14.5)
EST. AVERAGE GLUCOSE BLD GHB EST-MCNC: 123 MG/DL
GLOBULIN SER CALC-MCNC: 3.6 G/DL (ref 2–4)
GLUCOSE SERPL-MCNC: 107 MG/DL (ref 65–100)
HBA1C MFR BLD: 5.9 % (ref 4–5.6)
HCT VFR BLD AUTO: 43.4 % (ref 36.6–50.3)
HCV AB SERPL QL IA: NONREACTIVE
HCV COMMENT,HCGAC: NORMAL
HGB BLD-MCNC: 14 G/DL (ref 12.1–17)
MCH RBC QN AUTO: 28.9 PG (ref 26–34)
MCHC RBC AUTO-ENTMCNC: 32.3 G/DL (ref 30–36.5)
MCV RBC AUTO: 89.7 FL (ref 80–99)
NRBC # BLD: 0 K/UL (ref 0–0.01)
NRBC BLD-RTO: 0 PER 100 WBC
PLATELET # BLD AUTO: 281 K/UL (ref 150–400)
PMV BLD AUTO: 10.7 FL (ref 8.9–12.9)
POTASSIUM SERPL-SCNC: 4.4 MMOL/L (ref 3.5–5.1)
PROT SERPL-MCNC: 7.2 G/DL (ref 6.4–8.2)
PSA SERPL-MCNC: 0.4 NG/ML (ref 0.01–4)
RBC # BLD AUTO: 4.84 M/UL (ref 4.1–5.7)
SODIUM SERPL-SCNC: 140 MMOL/L (ref 136–145)
TSH SERPL DL<=0.05 MIU/L-ACNC: 1.21 UIU/ML (ref 0.36–3.74)
WBC # BLD AUTO: 4.5 K/UL (ref 4.1–11.1)

## 2021-06-08 RX ORDER — BETAMETHASONE DIPROPIONATE 0.5 MG/G
CREAM TOPICAL 2 TIMES DAILY
Qty: 15 G | Refills: 0 | Status: SHIPPED | OUTPATIENT
Start: 2021-06-08

## 2021-06-08 RX ORDER — CHLORPHENIRAMINE MALEATE 4 MG
TABLET ORAL 2 TIMES DAILY
Qty: 15 G | Refills: 0 | Status: SHIPPED | OUTPATIENT
Start: 2021-06-08

## 2021-06-08 NOTE — TELEPHONE ENCOUNTER
----- Message from Hamzah Greene MD sent at 2021 12:48 PM EDT -----  Regarding: FW: Prescription Question  Contact: [phone]  Pend prescription  ----- Message -----  From: Christian Baltazar  Sent: 2021   9:31 AM EDT  To: Hamzah Greene MD  Subject: FW: Prescription Question                          ----- Message -----  From: Demi Barbosa  Sent: 2021   9:25 AM EDT  To: [facility] Nurse Pool  Subject: Prescription Question                            This is Reema Lamas. Pedro Sanz has a reoccurring issue with a rash or something on his nose. Dr. Radha Mccall, ENT, prescribed Clotrimazole and Betamethasone Dipropionate Cream USP 1%/0.05% to treat that, and the Rx has . Venessa Nova is retiring and we have not yet chosen another ENT. Can you please call in a prescription for that to [facility]? Also could you recommend another ENT for all of us.

## 2021-06-09 RX ORDER — METFORMIN HYDROCHLORIDE 500 MG/1
TABLET ORAL
Qty: 100 TABLET | Refills: 0 | Status: SHIPPED | OUTPATIENT
Start: 2021-06-09 | End: 2021-11-02

## 2021-07-21 ENCOUNTER — TELEPHONE (OUTPATIENT)
Dept: INTERNAL MEDICINE CLINIC | Age: 51
End: 2021-07-21

## 2021-07-21 NOTE — TELEPHONE ENCOUNTER
#[phone] dad, Irvin Bauer states Dr. Linda Guthrie wanted to see pt in August.  Irvin Bauer states he just received a call pertaining to setting this up. There are no appts in August but VV. Did Dr. Linda Guthrie want a VV or in office? Please call to schedule.

## 2021-07-23 LAB
ALBUMIN/CREAT UR: <7 MG/G CREAT (ref 0–29)
CREAT UR-MCNC: 42.7 MG/DL
MICROALBUMIN UR-MCNC: <3 UG/ML
TSH SERPL DL<=0.005 MIU/L-ACNC: 1.92 UIU/ML (ref 0.45–4.5)

## 2021-07-23 NOTE — PROGRESS NOTES
Please call patient back about results  Please schedule 3 month follow-up with the patient it can be a virtual also on the labs were just drawn patient had an A1c and BMP see if any of this can be added to the blood lab otherwise he needs to go back

## 2021-07-26 LAB
BUN SERPL-MCNC: 14 MG/DL (ref 6–24)
BUN/CREAT SERPL: 16 (ref 9–20)
CALCIUM SERPL-MCNC: 9 MG/DL (ref 8.7–10.2)
CHLORIDE SERPL-SCNC: 101 MMOL/L (ref 96–106)
CO2 SERPL-SCNC: 25 MMOL/L (ref 20–29)
CREAT SERPL-MCNC: 0.9 MG/DL (ref 0.76–1.27)
GLUCOSE SERPL-MCNC: 100 MG/DL (ref 65–99)
POTASSIUM SERPL-SCNC: 4.4 MMOL/L (ref 3.5–5.2)
SODIUM SERPL-SCNC: 142 MMOL/L (ref 134–144)
SPECIMEN STATUS REPORT, ROLRST: NORMAL

## 2021-07-28 RX ORDER — FLASH GLUCOSE SENSOR
KIT MISCELLANEOUS
Qty: 1 KIT | Refills: 1 | Status: SHIPPED | OUTPATIENT
Start: 2021-07-28 | End: 2021-08-09

## 2021-08-01 RX ORDER — CLEMASTINE FUMARATE 2.68 MG/1
TABLET ORAL
Qty: 180 TABLET | Refills: 1 | Status: SHIPPED | OUTPATIENT
Start: 2021-08-01 | End: 2021-12-29

## 2021-08-09 RX ORDER — FLASH GLUCOSE SENSOR
KIT MISCELLANEOUS
Qty: 1 KIT | Refills: 2 | Status: SHIPPED | OUTPATIENT
Start: 2021-08-09 | End: 2021-09-27

## 2021-08-18 NOTE — PROGRESS NOTES
HISTORY OF PRESENT ILLNESS  Lakisha Smiley is a 46 y.o. male. HPI     Last visit was 5/25/21. Here for routine care.   Presents with mom who provide hx   This is an established visit completed with telemedicine was completed with video assist  the patient acknowledges and agrees to this method of visitation debbieyme    No home BP readings, discussed checking BP at least once per month     Wt was 175 lbs lov     He takes metformin for DM 500mg daily  BS: PM: 153, 178, 127 Afternoon: 79 AM: 147 108  Discussed writing down BS readings  He has free style julius     Reviewed labs        The patient has a h/o hlp   On lipitor     On pepcid for gerd has Protonix use as needed  Takes this twice daily no issues    Not taking protonix every day     Takes mvi  Take vit b12  Takes allegra for [place] [employer]     Takes calcium  Takes vitamin d 2000units      Has constantino--he wears cpap most nights 8/21  He sees Dr. Daniel Mcmahon  Last there 10/29/20       Sees dr. Lincoln Diaz) for chronic cough from gerd/reflux   Last there 5/31/19  Has been using gaviscon which has helped  He is on clemastine BID for a cough   Last ct chest 10/20: stable changes   Provided referral to luda N[facility]  Saw Dr. Mercedez Medrano (ENT) and had ear cleaning  Was given fungal cream for his nose  This is helping a little, but the fungus is still there  Discussed that this looks more like rosacea, will give metrogel for this  Discussed that if this does not improve should see derm  Has lipoma on R side neck, he is following this, had us 2/19     Has bunions, not operated yet  Sees gretchen, trying to treat foot fungus, got new shoes      Has a h/o rash on his nose was told it was a fungus by the ENT looks more like rosacea has some reddish hue on cheeks and nose  We will give metrogel for this possible rosacea     He c/o pain in throat in chest x as long as he can remember was sent to cardiology for this and had evaluation  He saw Dr. Sharyle Baxter (cardio) for CP  Last there 9/30/20 Takes asa at night--has h/o isolated dvt in 2008 no recurrent issues   Was on coumadin until 2018     Pt lives with parents     Pt is not functionally independent      PREVENTIVE:  Colonoscopy: 5/18 sara 10 yrs, has had egd as well   PSA: 6/20 0.3 5/21 0.4  AAA screen not needed  Tdap: 10/19  Pneumovax: 2/21  Prev 13 not yet needed  Shingrix: discussed, ordered prescription   Flu shot 10/20  Ophtho: 7/20 amanda joeler  A1c: 12/19 8.2 6/20 5.9 11/20 5.9 2/21 6.0 5/21 5.9  Lipids 2/21  ldl 65  Micro  11/20   Hep C screen: 5/21 negative  Covid: both (moderna)    Patient Active Problem List    Diagnosis Date Noted    Type 2 diabetes mellitus without complication, without long-term current use of insulin ([facility] [address] 04/06/2020    SERGIO on CPAP 04/06/2020    Gastroesophageal reflux disease without esophagitis 04/04/2020    Mixed hyperlipidemia 04/04/2020    DVT (deep venous thrombosis) (HCC) 07/17/2014    Down's syndrome      Current Outpatient Medications   Medication Sig Dispense Refill    metroNIDAZOLE (METROGEL) 1 % topical gel Apply  to affected area daily. Use a thin layer to affected areas after washing 45 g 0    FreeStyle Lashawn 2 Sensor kit USE TO SCAN SENSOR AT LEAST 3 TIMES DAILY APPLY AND REPLACE SENSOR EVERY 14 DAYS 1 Kit 2    clemastine 2.68 mg tab tablet TAKE 1 TABLET TWICE A  Tablet 1    pantoprazole (PROTONIX) 40 mg tablet Take 1 Tablet by mouth daily. 30 Tablet 0    pantoprazole (PROTONIX) 40 mg tablet Take 1 Tablet by mouth daily. 90 Tablet 1    metFORMIN (GLUCOPHAGE) 500 mg tablet TAKE 1 TABLET DAILY 100 Tablet 0    clotrimazole (LOTRIMIN) 1 % topical cream Apply  to affected area two (2) times a day. 15 g 0    betamethasone dipropionate (DIPROSONE) 0.05 % topical cream Apply  to affected area two (2) times a day.  15 g 0    varicella-zoster recombinant, PF, (Shingrix, PF,) 50 mcg/0.5 mL susr injection 0.5mL by IntraMUSCular route once now and then repeat in 2-6 months 0.5 mL 1    famotidine (PEPCID) 20 mg tablet Take 1 Tab by mouth two (2) times daily as needed for Heartburn, Gastroesophageal Reflux Disease (GERD) or Indigestion. 180 Tab 1    atorvastatin (LIPITOR) 40 mg tablet TAKE 1 TABLET NIGHTLY 90 Tab 1    FreeStyle Lashawn 14 Day [place] misc USE TO SCAN SENSOR AT LEAST THREE TIMES DAILY AS DIRECTED 1 Each 0    glucose blood VI test strips (FreeStyle Precision Aleksandr Strips) strip Check once daily 100 Strip 1    fexofenadine (ALLEGRA) 180 mg tablet Take 180 mg by mouth daily.  cholecalciferol, vitamin D3, (Vitamin D3) 50 mcg (2,000 unit) tab Take  by mouth daily.  aspirin delayed-release 81 mg tablet Take  by mouth daily.  PSEUDOEPHEDRINE-guaiFENesin (Mucinex D)  mg per tablet Take 1 Tab by mouth every twelve (12) hours.  ondansetron hcl (ZOFRAN, AS HYDROCHLORIDE,) 4 mg tablet Take 1 Tab by mouth every eight (8) hours as needed for Nausea. 20 Tab 0    TG-SX-KG-Fe-Min-Lycopen-Lutein (CENTRUM) 0.4-162-18 mg Tab Take 1 Tab by mouth daily.  vit B cmplx & C #11-ca-dha-Q10 (BRAIN MIGHT-DHA-CO Q10) 140-10-10 mg Tab Take 1 Tab by mouth daily.  calcium 600 mg cap Take 1 Tab by mouth daily.  Azelastine (ASTEPRO) 0.15 % (205.5 mcg) nasal spray 2 Sprays by Both Nostrils route as needed.        Past Surgical History:   Procedure Laterality Date    COLONOSCOPY N/A 5/31/2018    COL[facility]ftali [facility]breu [facility] at [facility] ENDOSCOPY    HX CHOLECYSTECTOMY      HX OTHER SURGICAL      convergent strabismus s/p corrective surgery      Lab Results   Component Value Date/Time    WBC 4.5 05/25/2021 02:13 PM    HGB 14.0 05/25/2021 02:13 PM    HCT 43.4 05/25/2021 02:13 PM    PLATELET 290 55/86/4672 02:13 PM    MCV 89.7 05/25/2021 02:13 PM     Lab Results   Component Value Date/Time    Cholesterol, total 133 02/17/2021 09:34 AM    HDL Cholesterol 51 02/17/2021 09:34 AM    LDL, calculated 65 02/17/2021 09:34 AM    LDL, calculated 58 06/05/2020 10:11 AM Triglyceride 90 02/17/2021 09:34 AM     Lab Results   Component Value Date/Time    GFR est non-AA 99 07/22/2021 09:07 AM    GFR est  07/22/2021 09:07 AM    Creatinine 0.90 07/22/2021 09:07 AM    BUN 14 07/22/2021 09:07 AM    Sodium 142 07/22/2021 09:07 AM    Potassium 4.4 07/22/2021 09:07 AM    Chloride 101 07/22/2021 09:07 AM    CO2 25 07/22/2021 09:07 AM    Magnesium 2.0 05/22/2014 01:45 PM        Review of Systems   Respiratory: Negative for shortness of breath. Cardiovascular: Negative for chest pain. Skin:        Fungal infection, redness on nose       Physical Exam  Constitutional:       General: He is not in acute distress. Appearance: Normal appearance. He is not ill-appearing, toxic-appearing or diaphoretic. HENT:      Head: Normocephalic and atraumatic. Eyes:      General:         Right eye: No discharge. Left eye: No discharge. Conjunctiva/sclera: Conjunctivae normal.   Pulmonary:      Effort: No respiratory distress. Skin:     Comments: Rosacea on nose and cheeks   Neurological:      Mental Status: He is alert and oriented to person, place, and time. Mental status is at baseline. Gait: Gait normal.   Psychiatric:         Mood and Affect: Mood normal.         Behavior: Behavior normal.         ASSESSMENT and PLAN    ICD-10-CM ICD-9-CM    1. Type 2 diabetes mellitus without complication, without long-term current use of insulin ([facility])      E11.9 250.00 REFERRAL TO PULMONARY DISEASE      MICROALBUMIN, UR, RAND W/ MICROALB/CREAT RATIO   Controlled on Metformin Home readings have been okay check A1c we will check an eye exam    METABOLIC PANEL, COMPREHENSIVE      HEMOGLOBIN A1C WITH EAG   2. SERGIO on CPAP  G47.33 327.23 MICROALBUMIN, UR, RAND W/ MICROALB/CREAT RATIO   Compliant with CPAP J44.24 R16.5 METABOLIC PANEL, COMPREHENSIVE      HEMOGLOBIN A1C WITH EAG   3.  Gastroesophageal reflux disease without esophagitis  K21.9 530.81 MICROALBUMIN, UR, RAND W/ MICROALB/CREAT RATIO   Uses Pepcid twice daily   METABOLIC PANEL, COMPREHENSIVE      HEMOGLOBIN A1C WITH EAG   4. Down's syndrome  Q90.9 758.0 MICROALBUMIN, UR, RAND W/ MICROALB/CREAT RATIO   Cared for by parents   METABOLIC PANEL, COMPREHENSIVE      HEMOGLOBIN A1C WITH EAG   5. Mixed hyperlipidemia  E78.2 272.2 MICROALBUMIN, UR, RAND W/ MICROALB/CREAT RATIO   Controlled on Lipitor   METABOLIC PANEL, COMPREHENSIVE      HEMOGLOBIN A1C WITH EAG   6. Deep vein thrombosis (DVT) of proximal lower extremity, unspecified chronicity, unspecified laterality (HCC)  I82.4Y9 453.41 MICROALBUMIN, UR, RAND W/ MICROALB/CREAT RATIO   History of in the past just on aspirin for prophylaxis prevention of DVT   METABOLIC PANEL, COMPREHENSIVE      HEMOGLOBIN A1C WITH EAG   7. Chronic cough  R05 786.2 MICROALBUMIN, UR, RAND W/ MICROALB/CREAT RATIO   Improved with clemastine normally follows with pulmonary last imaging was in October has some old lingular scarring changes due to follow-up with pulmonary placed referral again for him   METABOLIC PANEL, COMPREHENSIVE      HEMOGLOBIN A1C WITH EAG   8. Rosacea     Rash seems consistent with rosacea we will treat with MetroGel L71.9 695.3         Scribed by Mecca Murphy of [facility], as dictated by Dr. Rufus Yeung. Current diagnosis and concerns discussed with pt at length. Pt understands risks and benefits or current treatment plan and medications, and accepts the treatment and medication with any possible risks. Pt asks appropriate questions, which were answered. Pt was instructed to call with any concerns or problems. I have reviewed the note documented by the scribe. The services provided are my own. The documentation is accurate     Chestine Rumps, was evaluated through a synchronous (real-time) audio-video encounter. The patient (or guardian if applicable) is aware that this is a billable service. Verbal consent to proceed has been obtained within the past 12 months.  The visit was conducted pursuant to the emergency declaration under the [facility], [address] [place] [address] authority and the [employer] General Act. Patient identification was verified, and a caregiver was present when appropriate. The patient was located in a state where the provider was credentialed to provide care.

## 2021-08-20 ENCOUNTER — VIRTUAL VISIT (OUTPATIENT)
Dept: INTERNAL MEDICINE CLINIC | Age: 51
End: 2021-08-20
Payer: MEDICARE

## 2021-08-20 DIAGNOSIS — I82.4Y9 DEEP VEIN THROMBOSIS (DVT) OF PROXIMAL LOWER EXTREMITY, UNSPECIFIED CHRONICITY, UNSPECIFIED LATERALITY (HCC): ICD-10-CM

## 2021-08-20 DIAGNOSIS — Z99.89 OSA ON CPAP: ICD-10-CM

## 2021-08-20 DIAGNOSIS — G47.33 OSA ON CPAP: ICD-10-CM

## 2021-08-20 DIAGNOSIS — L71.9 ROSACEA: ICD-10-CM

## 2021-08-20 DIAGNOSIS — R05.3 CHRONIC COUGH: ICD-10-CM

## 2021-08-20 DIAGNOSIS — E78.2 MIXED HYPERLIPIDEMIA: ICD-10-CM

## 2021-08-20 DIAGNOSIS — K21.9 GASTROESOPHAGEAL REFLUX DISEASE WITHOUT ESOPHAGITIS: ICD-10-CM

## 2021-08-20 DIAGNOSIS — Q90.9 DOWN'S SYNDROME: ICD-10-CM

## 2021-08-20 DIAGNOSIS — E11.9 TYPE 2 DIABETES MELLITUS WITHOUT COMPLICATION, WITHOUT LONG-TERM CURRENT USE OF INSULIN (HCC): Primary | ICD-10-CM

## 2021-08-20 PROCEDURE — 99214 OFFICE O/P EST MOD 30 MIN: CPT | Performed by: INTERNAL MEDICINE

## 2021-08-20 PROCEDURE — G8427 DOCREV CUR MEDS BY ELIG CLIN: HCPCS | Performed by: INTERNAL MEDICINE

## 2021-08-20 PROCEDURE — G8510 SCR DEP NEG, NO PLAN REQD: HCPCS | Performed by: INTERNAL MEDICINE

## 2021-08-20 PROCEDURE — 3017F COLORECTAL CA SCREEN DOC REV: CPT | Performed by: INTERNAL MEDICINE

## 2021-08-20 PROCEDURE — 2022F DILAT RTA XM EVC RTNOPTHY: CPT | Performed by: INTERNAL MEDICINE

## 2021-08-20 PROCEDURE — 3044F HG A1C LEVEL LT 7.0%: CPT | Performed by: INTERNAL MEDICINE

## 2021-08-20 RX ORDER — METRONIDAZOLE 10 MG/G
GEL TOPICAL DAILY
Qty: 45 G | Refills: 0 | Status: SHIPPED | OUTPATIENT
Start: 2021-08-20 | End: 2022-06-12

## 2021-09-03 ENCOUNTER — APPOINTMENT (OUTPATIENT)
Dept: INTERNAL MEDICINE CLINIC | Age: 51
End: 2021-09-03

## 2021-09-04 LAB
ALBUMIN SERPL-MCNC: 3.6 G/DL (ref 3.5–5)
ALBUMIN/GLOB SERPL: 1.1 {RATIO} (ref 1.1–2.2)
ALP SERPL-CCNC: 76 U/L (ref 45–117)
ALT SERPL-CCNC: 38 U/L (ref 12–78)
ANION GAP SERPL CALC-SCNC: 7 MMOL/L (ref 5–15)
AST SERPL-CCNC: 20 U/L (ref 15–37)
BILIRUB SERPL-MCNC: 0.8 MG/DL (ref 0.2–1)
BUN SERPL-MCNC: 14 MG/DL (ref 6–20)
BUN/CREAT SERPL: 15 (ref 12–20)
CALCIUM SERPL-MCNC: 8.5 MG/DL (ref 8.5–10.1)
CHLORIDE SERPL-SCNC: 106 MMOL/L (ref 97–108)
CO2 SERPL-SCNC: 29 MMOL/L (ref 21–32)
CREAT SERPL-MCNC: 0.96 MG/DL (ref 0.7–1.3)
CREAT UR-MCNC: 116 MG/DL
EST. AVERAGE GLUCOSE BLD GHB EST-MCNC: 123 MG/DL
GLOBULIN SER CALC-MCNC: 3.4 G/DL (ref 2–4)
GLUCOSE SERPL-MCNC: 96 MG/DL (ref 65–100)
HBA1C MFR BLD: 5.9 % (ref 4–5.6)
MICROALBUMIN UR-MCNC: <0.5 MG/DL
MICROALBUMIN/CREAT UR-RTO: NORMAL MG/G (ref 0–30)
POTASSIUM SERPL-SCNC: 4.2 MMOL/L (ref 3.5–5.1)
PROT SERPL-MCNC: 7 G/DL (ref 6.4–8.2)
SODIUM SERPL-SCNC: 142 MMOL/L (ref 136–145)

## 2021-09-07 LAB
COMMENT, HOLDF: NORMAL
SAMPLES BEING HELD,HOLD: NORMAL

## 2021-09-27 RX ORDER — FLASH GLUCOSE SENSOR
KIT MISCELLANEOUS
Qty: 1 KIT | Refills: 1 | Status: SHIPPED | OUTPATIENT
Start: 2021-09-27 | End: 2021-11-08

## 2021-11-02 RX ORDER — ATORVASTATIN CALCIUM 40 MG/1
TABLET, FILM COATED ORAL
Qty: 90 TABLET | Refills: 1 | Status: SHIPPED | OUTPATIENT
Start: 2021-11-02 | End: 2022-05-01 | Stop reason: SDUPTHER

## 2021-11-02 RX ORDER — METFORMIN HYDROCHLORIDE 500 MG/1
TABLET ORAL
Qty: 100 TABLET | Refills: 0 | Status: SHIPPED | OUTPATIENT
Start: 2021-11-02 | End: 2021-11-08

## 2021-11-08 RX ORDER — FLASH GLUCOSE SENSOR
KIT MISCELLANEOUS
Qty: 1 KIT | Refills: 0 | Status: SHIPPED | OUTPATIENT
Start: 2021-11-08 | End: 2021-11-16

## 2021-11-08 RX ORDER — METFORMIN HYDROCHLORIDE 500 MG/1
TABLET ORAL
Qty: 100 TABLET | Refills: 0 | Status: SHIPPED | OUTPATIENT
Start: 2021-11-08 | End: 2022-06-19

## 2021-11-16 RX ORDER — FLASH GLUCOSE SENSOR
KIT MISCELLANEOUS
Qty: 1 KIT | Refills: 2 | Status: SHIPPED | OUTPATIENT
Start: 2021-11-16 | End: 2021-12-17

## 2021-11-23 ENCOUNTER — OFFICE VISIT (OUTPATIENT)
Dept: INTERNAL MEDICINE CLINIC | Age: 51
End: 2021-11-23
Payer: MEDICARE

## 2021-11-23 VITALS
SYSTOLIC BLOOD PRESSURE: 113 MMHG | TEMPERATURE: 97.7 F | BODY MASS INDEX: 31.49 KG/M2 | RESPIRATION RATE: 16 BRPM | DIASTOLIC BLOOD PRESSURE: 74 MMHG | OXYGEN SATURATION: 100 % | WEIGHT: 189 LBS | HEART RATE: 82 BPM | HEIGHT: 65 IN

## 2021-11-23 DIAGNOSIS — I82.4Y9 DEEP VEIN THROMBOSIS (DVT) OF PROXIMAL LOWER EXTREMITY, UNSPECIFIED CHRONICITY, UNSPECIFIED LATERALITY (HCC): ICD-10-CM

## 2021-11-23 DIAGNOSIS — Z99.89 OSA ON CPAP: ICD-10-CM

## 2021-11-23 DIAGNOSIS — E78.2 MIXED HYPERLIPIDEMIA: ICD-10-CM

## 2021-11-23 DIAGNOSIS — G47.33 OSA ON CPAP: ICD-10-CM

## 2021-11-23 DIAGNOSIS — E11.9 TYPE 2 DIABETES MELLITUS WITHOUT COMPLICATION, WITHOUT LONG-TERM CURRENT USE OF INSULIN (HCC): Primary | ICD-10-CM

## 2021-11-23 DIAGNOSIS — K21.9 GASTROESOPHAGEAL REFLUX DISEASE WITHOUT ESOPHAGITIS: ICD-10-CM

## 2021-11-23 DIAGNOSIS — Q90.9 DOWN'S SYNDROME: ICD-10-CM

## 2021-11-23 LAB — HBA1C MFR BLD HPLC: 5.6 %

## 2021-11-23 PROCEDURE — 99214 OFFICE O/P EST MOD 30 MIN: CPT | Performed by: INTERNAL MEDICINE

## 2021-11-23 PROCEDURE — 3044F HG A1C LEVEL LT 7.0%: CPT | Performed by: INTERNAL MEDICINE

## 2021-11-23 PROCEDURE — 83036 HEMOGLOBIN GLYCOSYLATED A1C: CPT | Performed by: INTERNAL MEDICINE

## 2021-11-23 PROCEDURE — G8417 CALC BMI ABV UP PARAM F/U: HCPCS | Performed by: INTERNAL MEDICINE

## 2021-11-23 PROCEDURE — G8510 SCR DEP NEG, NO PLAN REQD: HCPCS | Performed by: INTERNAL MEDICINE

## 2021-11-23 PROCEDURE — G8427 DOCREV CUR MEDS BY ELIG CLIN: HCPCS | Performed by: INTERNAL MEDICINE

## 2021-11-23 PROCEDURE — 2022F DILAT RTA XM EVC RTNOPTHY: CPT | Performed by: INTERNAL MEDICINE

## 2021-11-23 PROCEDURE — 3017F COLORECTAL CA SCREEN DOC REV: CPT | Performed by: INTERNAL MEDICINE

## 2021-12-13 DIAGNOSIS — K21.9 GASTROESOPHAGEAL REFLUX DISEASE WITHOUT ESOPHAGITIS: Primary | ICD-10-CM

## 2021-12-13 RX ORDER — PANTOPRAZOLE SODIUM 40 MG/1
40 TABLET, DELAYED RELEASE ORAL DAILY
Qty: 90 TABLET | Refills: 1 | Status: SHIPPED | OUTPATIENT
Start: 2021-12-13

## 2021-12-13 NOTE — TELEPHONE ENCOUNTER
PCP: Shelbi Penaloza MD    Last appt: 11/23/2021  No future appointments. Requested Prescriptions     Pending Prescriptions Disp Refills    pantoprazole (PROTONIX) 40 mg tablet 90 Tablet 1     Sig: Take 1 Tablet by mouth daily.

## 2021-12-17 RX ORDER — FLASH GLUCOSE SENSOR
KIT MISCELLANEOUS
Qty: 1 KIT | Refills: 2 | Status: SHIPPED | OUTPATIENT
Start: 2021-12-17 | End: 2022-01-26

## 2021-12-29 RX ORDER — CLEMASTINE FUMARATE 2.68 MG/1
TABLET ORAL
Qty: 180 TABLET | Refills: 1 | Status: SHIPPED | OUTPATIENT
Start: 2021-12-29 | End: 2022-08-11

## 2022-01-26 ENCOUNTER — PATIENT MESSAGE (OUTPATIENT)
Dept: INTERNAL MEDICINE CLINIC | Age: 52
End: 2022-01-26

## 2022-01-26 DIAGNOSIS — J31.0 RHINITIS, UNSPECIFIED TYPE: ICD-10-CM

## 2022-01-26 DIAGNOSIS — E11.9 TYPE 2 DIABETES MELLITUS WITHOUT COMPLICATION, WITHOUT LONG-TERM CURRENT USE OF INSULIN (HCC): Primary | ICD-10-CM

## 2022-01-26 RX ORDER — AZELASTINE HCL 205.5 UG/1
2 SPRAY NASAL AS NEEDED
Qty: 3 EACH | Refills: 0 | Status: SHIPPED | OUTPATIENT
Start: 2022-01-26

## 2022-01-26 RX ORDER — FLASH GLUCOSE SENSOR
KIT MISCELLANEOUS
Qty: 1 KIT | Refills: 2 | Status: SHIPPED | OUTPATIENT
Start: 2022-01-26

## 2022-01-26 RX ORDER — FLASH GLUCOSE SENSOR
KIT MISCELLANEOUS
Qty: 1 KIT | Refills: 1 | Status: SHIPPED | OUTPATIENT
Start: 2022-01-26 | End: 2022-02-09

## 2022-01-26 NOTE — TELEPHONE ENCOUNTER
From: Meg Dillon  To: Jo Schafer MD  Sent: 1/26/2022 4:12 PM EST  Subject: Prescriptions     This is Fidencio. I need for you to send the following new prescriptions to [facility] for Leticia Coronado. 1) [address] [place] [address] is sending me a voucher for a replacement of the last sensor which did not work and the pharmacy need a new prescription. 2) Azelastine HCL Nasal [place]  This prescription was written back on 5/31/19 by Malinda Sol NP Leticia Coronado is having trouble with drainage causing him to cough a lot.

## 2022-02-01 NOTE — PROGRESS NOTES
HISTORY OF PRESENT ILLNESS  Malgorzata Armendariz is a 46 y.o. male. HPI     Last here 11/23/21. Here for routine care.   Presents with mom who provide hx    He isn't working right now because [facility] has been having covid outbreaks, staying at home more     BP today is 98/66  He is taking ASA daily     Wt today is 191 lbs, up 16 lbs x lov     112 104 110 115 107 116   He takes metformin for DM 500mg daily  He has free style julius     Reviewed labs   Will get labs today           The patient has a h/o hlp   On lipitor 40mg      Has reflux  On pepcid BID for gerd   Uses protonix q AM  Takes this twice daily no issues          Takes mvi  Take vit b12  Takes allegra for [place] [employer]     Takes calcium  Takes vitamin d 2000units      Has constantino--he wears cpap 2/22  He has new machine and mask and is doing a better job with this  He sees Dr. Ofelia Verdugo  Last there 10/29/20   Will schedule f/u missed his last appointment discussed this he needs to reschedule still           Sees dr. Tammy Canales) for chronic cough from gerd/reflux   Last there 5/31/19  Has been using gaviscon which has helped  He is on clemastine BID for a cough   Last ct chest 10/20: stable changes   Has appt with Dr. Janet Cruz (pulm) scheduled for this month 2/22     Saw Dr. Simran Garcia (ENT) and had ear cleaning  Was given fungal cream for his nose  Has lipoma on R side neck, he is following this, had us 2/19  Recall on exam: smooth grape-sized nodule R neck lipoma  CT neck 4/19: nl  US neck 2/19: lipoma  He still complains of difficulty with his throat which is a chronic issue discussed f/u with new ENT has appt scheduled     Has bunions, not operated yet  Sees gretchen, also Dr. Cyril Bernardo  trying to treat foot fungus, got new shoes he shaves down calluses on his feet            He saw Dr. Angélica Chirinos (cardio) for CP  Last there 9/30/20      Takes asa at night--has h/o isolated dvt in 2008 no recurrent issues   Was on coumadin until May take 2 Cialis at a time if necessary  2018      PREVENTIVE:  Colonoscopy: 5/18 sara 10 yrs, has had egd as well   PSA: 6/20 0.3 5/21 0.4  AAA screen not needed  Tdap: 10/19  Pneumovax: 2/21  Prev 13 not yet needed  Foot exam: 11/23/21  Shingrix: 1st dose, due for 2nd dose reminded discussed can get from local pharmacy  Flu shot: Fall 2021  Ophtho: 12/21 amanda skinner will get report  A1c: 12/19 8.2 6/20 5.9 11/20 5.9 2/21 6.0 5/21 5.9 9/21 5.9, 11/21 5.9 11/21 5.6 POC  Lipids 2/21  ldl 65  Micro: 9/21  Hep C screen: 5/21 negative  Covid: both + booster (moderna)    Patient Active Problem List    Diagnosis Date Noted    Type 2 diabetes mellitus without complication, without long-term current use of insulin ([facility] 04/06/2020    SERGIO on CPAP 04/06/2020    Gastroesophageal reflux disease without esophagitis 04/04/2020    Mixed hyperlipidemia 04/04/2020    DVT (deep venous thrombosis) ([facility] 07/17/2014    Down's syndrome      Current Outpatient Medications   Medication Sig Dispense Refill    FreeStyle Lashawn 2 Sensor kit USE TO SCAN SENSOR AT LEAST 3 TIMES DAILY, APPLY AND REPLACE SENSOR EVERY 14 DAYS 1 Kit 1    flash glucose sensor (FreeStyle Lashawn 2 Sensor) kit USE TO SCAN SENSOR AT LEAST THREE TIMES DAILY, APPLY AND REPLACE SENSOR EVERY 14 DAYS 1 Kit 2    azelastine (Astepro) 205.5 mcg (0.15 %) 2 Sprays by Both Nostrils route as needed (Rhrinitis). 3 Each 0    clemastine 2.68 mg tab tablet TAKE 1 TABLET TWICE A  Tablet 1    pantoprazole (PROTONIX) 40 mg tablet Take 1 Tablet by mouth daily. 90 Tablet 1    metFORMIN (GLUCOPHAGE) 500 mg tablet TAKE 1 TABLET DAILY 100 Tablet 0    atorvastatin (LIPITOR) 40 mg tablet TAKE 1 TABLET NIGHTLY 90 Tablet 1    famotidine (PEPCID) 20 mg tablet TAKE 1 TABLET 2 TIMES DAILY AS NEEDED FOR HEARTBURN, GASTROESOPHAGEAL REFLUX DISEASE (GERD), OR INDIGESTION 180 Tablet 1    metroNIDAZOLE (METROGEL) 1 % topical gel Apply  to affected area daily.  Use a thin layer to affected areas after washing 45 g 0    pantoprazole (PROTONIX) 40 mg tablet Take 1 Tablet by mouth daily. 30 Tablet 0    clotrimazole (LOTRIMIN) 1 % topical cream Apply  to affected area two (2) times a day. 15 g 0    betamethasone dipropionate (DIPROSONE) 0.05 % topical cream Apply  to affected area two (2) times a day. 15 g 0    varicella-zoster recombinant, PF, (Shingrix, PF,) 50 mcg/0.5 mL susr injection 0.5mL by IntraMUSCular route once now and then repeat in 2-6 months 0.5 mL 1    FreeStyle Lashawn 14 Day [place] misc USE TO SCAN SENSOR AT LEAST THREE TIMES DAILY AS DIRECTED 1 Each 0    glucose blood VI test strips (FreeStyle Precision Aleksandr Strips) strip Check once daily 100 Strip 1    fexofenadine (ALLEGRA) 180 mg tablet Take 180 mg by mouth daily.  cholecalciferol, vitamin D3, (Vitamin D3) 50 mcg (2,000 unit) tab Take  by mouth daily.  aspirin delayed-release 81 mg tablet Take  by mouth daily.  PSEUDOEPHEDRINE-guaiFENesin (Mucinex D)  mg per tablet Take 1 Tab by mouth every twelve (12) hours.  ondansetron hcl (ZOFRAN, AS HYDROCHLORIDE,) 4 mg tablet Take 1 Tab by mouth every eight (8) hours as needed for Nausea. 20 Tab 0    vit B cmplx & C #11-ca-dha-Q10 (BRAIN MIGHT-DHA-CO Q10) 140-10-10 mg Tab Take 1 Tab by mouth daily.  calcium 600 mg cap Take 1 Tab by mouth daily.  OY-GO-UM-Fe-Min-Lycopen-Lutein (CENTRUM) 0.4-162-18 mg Tab Take 1 Tab by mouth daily.  (Patient not taking: Reported on 2/2/2022)       Past Surgical History:   Procedure Laterality Date    COLONOSCOPY N/A 5/31/2018    COLONOSCOPY performed by Roselyn Howe MD at \[facility]"" ENDOSCOPY    HX CHOLECYSTECTOMY      HX OTHER SURGICAL      convergent strabismus s/p corrective surgery      Lab Results   Component Value Date/Time    WBC 4.5 05/25/2021 02:13 PM    HGB 14.0 05/25/2021 02:13 PM    HCT 43.4 05/25/2021 02:13 PM    PLATELET 628 24/85/8876 02:13 PM    MCV 89.7 05/25/2021 02:13 PM     Lab Results   Component Value Date/Time    Cholesterol, total 133 02/17/2021 09:34 AM    HDL Cholesterol 51 02/17/2021 09:34 AM    LDL, calculated 65 02/17/2021 09:34 AM    LDL, calculated 58 06/05/2020 10:11 AM    Triglyceride 90 02/17/2021 09:34 AM     Lab Results   Component Value Date/Time    GFR est non-AA >60 09/03/2021 09:00 AM    GFR est AA >60 09/03/2021 09:00 AM    Creatinine 0.96 09/03/2021 09:00 AM    BUN 14 09/03/2021 09:00 AM    Sodium 142 09/03/2021 09:00 AM    Potassium 4.2 09/03/2021 09:00 AM    Chloride 106 09/03/2021 09:00 AM    CO2 29 09/03/2021 09:00 AM    Magnesium 2.0 05/22/2014 01:45 PM        Review of Systems   Respiratory: Negative for shortness of breath. Cardiovascular: Negative for chest pain. Physical Exam  Constitutional:       General: He is not in acute distress. Appearance: Normal appearance. He is not ill-appearing, toxic-appearing or diaphoretic. HENT:      Head: Normocephalic and atraumatic. Comments: Smooth grape-sized nodule R neck     Right Ear: External ear normal.      Left Ear: External ear normal.   Eyes:      General:         Right eye: No discharge. Left eye: No discharge. Conjunctiva/sclera: Conjunctivae normal.      Pupils: Pupils are equal, round, and reactive to light. Cardiovascular:      Rate and Rhythm: Normal rate and regular rhythm. Heart sounds: No murmur heard. No friction rub. No gallop. Pulmonary:      Effort: No respiratory distress. Breath sounds: Normal breath sounds. No wheezing or rales. Chest:      Chest wall: No tenderness. Musculoskeletal:         General: Normal range of motion. Cervical back: Normal range of motion and neck supple. Right lower leg: Edema present. Left lower leg: Edema present. Comments: Trace edema   Skin:     General: Skin is warm and dry. Neurological:      Mental Status: He is alert and oriented to person, place, and time. Mental status is at baseline.       Coordination: Coordination normal.      Gait: Gait normal. Psychiatric:         Mood and Affect: Mood normal.         Behavior: Behavior normal.         ASSESSMENT and PLAN    ICD-10-CM ICD-9-CM    1. Type 2 diabetes mellitus without complication, without long-term current use of insulin (HCC)     Well-controlled with Metformin eye exam up-to-date check A1c E11.9 250.00    2. Gastroesophageal reflux disease without esophagitis       Controlled Protonix and Pepcid K21.9 530.81    3. SERGIO on CPAP  G47.33 327.23    Compliant with CPAP Z99.89 V46.8    4. Deep vein thrombosis (DVT) of proximal lower extremity, unspecified chronicity, unspecified laterality (HCC)       History of on aspirin for prevention I82.4Y9 453.41    5. Mixed hyperlipidemia       Controlled diet E78.2 272.2     6 chronic cough takes clemastine and has follow-up with pulmonary pending  7 lipoma--eval by ENT has follow-up pending to discuss chronic throat symptoms    Depression screen reviewed and negative     Scribed by John Amador of [facility], as dictated by Dr. Patricia Kovacs. Current diagnosis and concerns discussed with pt at length. Pt understands risks and benefits or current treatment plan and medications, and accepts the treatment and medication with any possible risks. Pt asks appropriate questions, which were answered. Pt was instructed to call with any concerns or problems. I have reviewed the note documented by the scribe. The services provided are my own.   The documentation is accurate

## 2022-02-02 ENCOUNTER — OFFICE VISIT (OUTPATIENT)
Dept: INTERNAL MEDICINE CLINIC | Age: 52
End: 2022-02-02
Payer: MEDICARE

## 2022-02-02 VITALS
HEART RATE: 69 BPM | BODY MASS INDEX: 31.89 KG/M2 | DIASTOLIC BLOOD PRESSURE: 66 MMHG | WEIGHT: 191.4 LBS | SYSTOLIC BLOOD PRESSURE: 98 MMHG | RESPIRATION RATE: 16 BRPM | TEMPERATURE: 98.4 F | HEIGHT: 65 IN | OXYGEN SATURATION: 98 %

## 2022-02-02 DIAGNOSIS — Z99.89 OSA ON CPAP: ICD-10-CM

## 2022-02-02 DIAGNOSIS — R09.82 PND (POST-NASAL DRIP): ICD-10-CM

## 2022-02-02 DIAGNOSIS — R05.9 COUGH: ICD-10-CM

## 2022-02-02 DIAGNOSIS — I82.4Y9 DEEP VEIN THROMBOSIS (DVT) OF PROXIMAL LOWER EXTREMITY, UNSPECIFIED CHRONICITY, UNSPECIFIED LATERALITY (HCC): ICD-10-CM

## 2022-02-02 DIAGNOSIS — E78.2 MIXED HYPERLIPIDEMIA: ICD-10-CM

## 2022-02-02 DIAGNOSIS — Q90.9 DOWN'S SYNDROME: ICD-10-CM

## 2022-02-02 DIAGNOSIS — G47.33 OSA ON CPAP: ICD-10-CM

## 2022-02-02 DIAGNOSIS — K21.9 GASTROESOPHAGEAL REFLUX DISEASE WITHOUT ESOPHAGITIS: ICD-10-CM

## 2022-02-02 DIAGNOSIS — E11.9 TYPE 2 DIABETES MELLITUS WITHOUT COMPLICATION, WITHOUT LONG-TERM CURRENT USE OF INSULIN (HCC): Primary | ICD-10-CM

## 2022-02-02 LAB
ALBUMIN SERPL-MCNC: 3.6 G/DL (ref 3.5–5)
ALBUMIN/GLOB SERPL: 1.1 {RATIO} (ref 1.1–2.2)
ALP SERPL-CCNC: 86 U/L (ref 45–117)
ALT SERPL-CCNC: 42 U/L (ref 12–78)
ANION GAP SERPL CALC-SCNC: 4 MMOL/L (ref 5–15)
AST SERPL-CCNC: 19 U/L (ref 15–37)
BILIRUB SERPL-MCNC: 0.6 MG/DL (ref 0.2–1)
BUN SERPL-MCNC: 12 MG/DL (ref 6–20)
BUN/CREAT SERPL: 14 (ref 12–20)
CALCIUM SERPL-MCNC: 9 MG/DL (ref 8.5–10.1)
CHLORIDE SERPL-SCNC: 105 MMOL/L (ref 97–108)
CHOLEST SERPL-MCNC: 142 MG/DL
CO2 SERPL-SCNC: 31 MMOL/L (ref 21–32)
CREAT SERPL-MCNC: 0.83 MG/DL (ref 0.7–1.3)
ERYTHROCYTE [DISTWIDTH] IN BLOOD BY AUTOMATED COUNT: 15.4 % (ref 11.5–14.5)
EST. AVERAGE GLUCOSE BLD GHB EST-MCNC: 128 MG/DL
GLOBULIN SER CALC-MCNC: 3.3 G/DL (ref 2–4)
GLUCOSE SERPL-MCNC: 116 MG/DL (ref 65–100)
HBA1C MFR BLD: 6.1 % (ref 4–5.6)
HCT VFR BLD AUTO: 43.8 % (ref 36.6–50.3)
HDLC SERPL-MCNC: 41 MG/DL
HDLC SERPL: 3.5 {RATIO} (ref 0–5)
HGB BLD-MCNC: 14 G/DL (ref 12.1–17)
LDLC SERPL CALC-MCNC: 58.2 MG/DL (ref 0–100)
MCH RBC QN AUTO: 29.1 PG (ref 26–34)
MCHC RBC AUTO-ENTMCNC: 32 G/DL (ref 30–36.5)
MCV RBC AUTO: 91.1 FL (ref 80–99)
NRBC # BLD: 0 K/UL (ref 0–0.01)
NRBC BLD-RTO: 0 PER 100 WBC
PLATELET # BLD AUTO: 281 K/UL (ref 150–400)
PMV BLD AUTO: 10.2 FL (ref 8.9–12.9)
POTASSIUM SERPL-SCNC: 4.3 MMOL/L (ref 3.5–5.1)
PROT SERPL-MCNC: 6.9 G/DL (ref 6.4–8.2)
RBC # BLD AUTO: 4.81 M/UL (ref 4.1–5.7)
SODIUM SERPL-SCNC: 140 MMOL/L (ref 136–145)
TRIGL SERPL-MCNC: 214 MG/DL (ref ?–150)
TSH SERPL DL<=0.05 MIU/L-ACNC: 1.24 UIU/ML (ref 0.36–3.74)
VLDLC SERPL CALC-MCNC: 42.8 MG/DL
WBC # BLD AUTO: 4 K/UL (ref 4.1–11.1)

## 2022-02-02 PROCEDURE — G8510 SCR DEP NEG, NO PLAN REQD: HCPCS | Performed by: INTERNAL MEDICINE

## 2022-02-02 PROCEDURE — 3046F HEMOGLOBIN A1C LEVEL >9.0%: CPT | Performed by: INTERNAL MEDICINE

## 2022-02-02 PROCEDURE — 2022F DILAT RTA XM EVC RTNOPTHY: CPT | Performed by: INTERNAL MEDICINE

## 2022-02-02 PROCEDURE — G8427 DOCREV CUR MEDS BY ELIG CLIN: HCPCS | Performed by: INTERNAL MEDICINE

## 2022-02-02 PROCEDURE — G8417 CALC BMI ABV UP PARAM F/U: HCPCS | Performed by: INTERNAL MEDICINE

## 2022-02-02 PROCEDURE — 99214 OFFICE O/P EST MOD 30 MIN: CPT | Performed by: INTERNAL MEDICINE

## 2022-02-02 PROCEDURE — 3017F COLORECTAL CA SCREEN DOC REV: CPT | Performed by: INTERNAL MEDICINE

## 2022-02-09 RX ORDER — FLASH GLUCOSE SENSOR
KIT MISCELLANEOUS
Qty: 1 KIT | Refills: 2 | Status: SHIPPED | OUTPATIENT
Start: 2022-02-09 | End: 2022-05-28

## 2022-02-26 ENCOUNTER — PATIENT MESSAGE (OUTPATIENT)
Dept: INTERNAL MEDICINE CLINIC | Age: 52
End: 2022-02-26

## 2022-02-26 DIAGNOSIS — K21.9 GASTROESOPHAGEAL REFLUX DISEASE WITHOUT ESOPHAGITIS: Primary | ICD-10-CM

## 2022-02-26 NOTE — TELEPHONE ENCOUNTER
From: Bassem Yang  To: Roro Lwason MD  Sent: 2/26/2022 4:22 PM EST  Subject: New Prescription    Can you send a new prescription for 1 30day supply to [facility] and then send another prescription for a 90 day supply with refills to [facility] for the following:    Famotdine Tab 20mg    He only has a weeks supply at this point. Not sure [place] will get it here in a week.

## 2022-02-26 NOTE — TELEPHONE ENCOUNTER
PCP: Tameka Macias MD    Last appt: 2/2/2022  Future Appointments   Date Time Provider Nasra Mcguire   5/24/2022 10:15 AM Tameka Macias MD [facility] BS AMB       Requested Prescriptions     Pending Prescriptions Disp Refills    famotidine (PEPCID) 20 mg tablet 180 Tablet 1     Sig: Take 1 Tablet by mouth two (2) times daily as needed for Heartburn.  famotidine (PEPCID) 20 mg tablet 60 Tablet 0     Sig: Take 1 Tablet by mouth two (2) times daily as needed for Heartburn.

## 2022-02-28 RX ORDER — FAMOTIDINE 20 MG/1
20 TABLET, FILM COATED ORAL
Qty: 60 TABLET | Refills: 0 | Status: SHIPPED | OUTPATIENT
Start: 2022-02-28 | End: 2022-04-11

## 2022-02-28 RX ORDER — FAMOTIDINE 20 MG/1
20 TABLET, FILM COATED ORAL
Qty: 180 TABLET | Refills: 1 | Status: SHIPPED | OUTPATIENT
Start: 2022-02-28 | End: 2022-06-12

## 2022-03-18 PROBLEM — K21.9 GASTROESOPHAGEAL REFLUX DISEASE WITHOUT ESOPHAGITIS: Status: ACTIVE | Noted: 2020-04-04

## 2022-03-18 PROBLEM — Z99.89 OSA ON CPAP: Status: ACTIVE | Noted: 2020-04-06

## 2022-03-18 PROBLEM — G47.33 OSA ON CPAP: Status: ACTIVE | Noted: 2020-04-06

## 2022-03-19 PROBLEM — E11.9 TYPE 2 DIABETES MELLITUS WITHOUT COMPLICATION, WITHOUT LONG-TERM CURRENT USE OF INSULIN (HCC): Status: ACTIVE | Noted: 2020-04-06

## 2022-03-19 PROBLEM — E78.2 MIXED HYPERLIPIDEMIA: Status: ACTIVE | Noted: 2020-04-04

## 2022-03-22 RX ORDER — FLASH GLUCOSE SENSOR
KIT MISCELLANEOUS
Qty: 1 KIT | Refills: 1 | Status: SHIPPED | OUTPATIENT
Start: 2022-03-22 | End: 2022-06-13

## 2022-03-23 RX ORDER — FLASH GLUCOSE SCANNING READER
EACH MISCELLANEOUS
Qty: 1 EACH | Refills: 0 | Status: SHIPPED | OUTPATIENT
Start: 2022-03-23

## 2022-04-11 DIAGNOSIS — K21.9 GASTROESOPHAGEAL REFLUX DISEASE WITHOUT ESOPHAGITIS: ICD-10-CM

## 2022-04-11 RX ORDER — FAMOTIDINE 20 MG/1
TABLET, FILM COATED ORAL
Qty: 60 TABLET | Refills: 0 | Status: SHIPPED | OUTPATIENT
Start: 2022-04-11 | End: 2022-05-24

## 2022-05-01 DIAGNOSIS — E78.2 MIXED HYPERLIPIDEMIA: Primary | ICD-10-CM

## 2022-05-01 RX ORDER — ATORVASTATIN CALCIUM 40 MG/1
40 TABLET, FILM COATED ORAL
Qty: 90 TABLET | Refills: 1 | Status: SHIPPED | OUTPATIENT
Start: 2022-05-01

## 2022-05-01 NOTE — TELEPHONE ENCOUNTER
----- Message from John Burgos sent at 4/30/2022  2:57 PM EDT -----  Subject: Refill Request    QUESTIONS  Name of Medication? Other - atorvastatin (LIPITOR) 40 mg tablet  Patient-reported dosage and instructions? request 40 mg tablet   How many days do you have left? 0  Preferred Pharmacy? [facility] phone number (if available)? [phone]  Additional Information for Provider? [facility] has been using Fax #, to reach   PCP in regards tp pt Rx script for refill conformation. Fax To -   [phone] Call @ - [phone] - [facility] [place]   ---------------------------------------------------------------------------  --------------  Any [place]ONEY  What is the best way for the office to contact you? OK to leave message on   voicemail  Preferred Call Back Phone Number? [phone]  ---------------------------------------------------------------------------  --------------  SCRIPT ANSWERS  Relationship to Patient? Third Party  Third Party Type? Pharmacy? Representative Name?  Kamran Palomares

## 2022-05-01 NOTE — TELEPHONE ENCOUNTER
PCP: Malika Mcwilliams MD    Last appt: 4/9/2021  Future Appointments   Date Time Provider Nasra Mcguire   5/24/2022 10:15 AM Malika Mcwilliams MD [facility] BS AMB       Requested Prescriptions     Pending Prescriptions Disp Refills    atorvastatin (LIPITOR) 40 mg tablet 90 Tablet 1     Sig: Take 1 Tablet by mouth nightly.

## 2022-05-21 ENCOUNTER — TELEPHONE (OUTPATIENT)
Dept: INTERNAL MEDICINE CLINIC | Age: 52
End: 2022-05-21

## 2022-05-21 DIAGNOSIS — U07.1 COVID-19: Primary | ICD-10-CM

## 2022-05-21 RX ORDER — NIRMATRELVIR AND RITONAVIR 300-100 MG
KIT ORAL
Qty: 1 BOX | Refills: 0 | Status: SHIPPED | OUTPATIENT
Start: 2022-05-21 | End: 2022-05-24 | Stop reason: ALTCHOICE

## 2022-05-21 NOTE — TELEPHONE ENCOUNTER
Patient  Returned to work at [employer] and had covid cotacts and  tested positive for COVID -19 today  Has hx of diabetes , obesity, HTN, SERGIO   He was advised to contact his PCP for paxlovid  Having cough and congestion and low grade fever  Given commorbidities will prescribe paxlovid  Patient had normal renal function in  Feb 2022 when last seen by Dr Ellie Aguilar and has appointment next week May 24 which needs to be changed virtual       Orders Placed This Encounter    nirmatrelvir-ritonavir (Paxlovid, EUA,) 150 mg x 2- 100 mg tablet     Sig: Follow package directions     Dispense:  1 Box     Refill:  0     Normal GFR above 60     Order Specific Question:   Does this patient qualify for COVID-19 antiviral treatment based on criteria for treatment?      Answer:   Yes     Please change appointment to virtual since he has COVID-23

## 2022-05-23 NOTE — PROGRESS NOTES
HISTORY OF PRESENT ILLNESS  Mary Neri is a 46 y.o. male. HPI     Last here 2/02/22. Here for routine care.   Presents with his sister who provide hx  This is an established visit completed with telemedicine was completed with video assist  the patient acknowledges and agrees to this method of visitation [email or web address]    He started having sxs over 5 days ago, likely started Tuesday or Wednesday of last week   He tested positive for covid, his family members have covid as well  He tested positive Saturday morning  He took paxlovid per Dr. Ruiz Service 5/21/22  Discussed possibility of paxlovid rebound  He c/o tiredness, achey, HA, chest and nasal congestion, fever  Taking tylenol twice per day, helps with muscle aches, HA, fever    Blood pressure is generally well controlled      He is taking ASA daily- for DVT prevention     Wt was 191 lbs lov    He takes metformin for DM 500mg daily  BS fasting 120s 164 96 has had some fluctuating sugars recently with his COVID  He has free style julius     Reviewed labs   Ordered labs       The patient has a h/o hlp   On lipitor 40mg        Has reflux  On pepcid BID for gerd   Uses protonix q AM  Takes this twice daily no issues     Takes mvi  Take vit b12  Takes allegra for [facility]     Takes calcium  Takes vitamin d 2000units      Has constantino--he wears cpap 5/22  He has new machine and mask and is doing a better job with this  He sees Dr. Don Olvera  Last there 10/29/20       (pulm) for chronic cough from gerd/reflux   Has been using gaviscon which has helped  He is on clemastine BID for a cough   Last ct chest 10/20: stable changes   Saw Dr. Julio Thorpe (pulm) 2/22- per pt he suggested doubling dose of mucinex to help with postnasal drip, this did not help , PFTs ordered-- will get notes for review     Saw Dr. Sully Padilla (ENT) and had ear cleaning  Was given fungal cream for his nose  Has lipoma on R side neck, he is following this, had us 2/19  Recall on exam: smooth grape-sized nodule R neck lipoma  CT neck 4/19: nl  US neck 2/19: lipoma  Saw Dr. Trevor Banks (ENT), suggested doubling up on reflux meds, this has helped with cough     Has bunions, not operated yet  Sees gretchen, also Dr. Jose Armando Arnold  trying to treat foot fungus, got new shoes he shaves down calluses on his feet     He saw Dr. Valentin Crowell (cardio) for CP  Last there 9/30/20      Takes asa at night--has h/o isolated dvt in 2008 no recurrent issues   Was on coumadin until 2018    Does not drive or do own meds  His sister is helping      PREVENTIVE:  Colonoscopy: 5/18 sara 10 yrs, has had egd as well   PSA: 6/20 0.3 5/21 0.4  AAA screen not needed  Tdap: 10/19  Pneumovax: 2/21  Prev 13 not yet needed  Foot exam: 11/23/21  Shingrix: 1st dose, due for 2nd dose reminded  Flu shot: Fall 2021  Ophtho: 12/21 amanda tower   A1c:  9/21 5.9, 11/21 5.9 11/21 5.6 POC 2/22 6.1  Lipids: 2/22 LDL 58  Micro: 9/21  Hep C screen: 5/21 negative  Covid: both + booster (moderna)    Patient Active Problem List    Diagnosis Date Noted    Type 2 diabetes mellitus without complication, without long-term current use of insulin ([facility] 04/06/2020    SERGIO on CPAP 04/06/2020    Gastroesophageal reflux disease without esophagitis 04/04/2020    Mixed hyperlipidemia 04/04/2020    DVT (deep venous thrombosis) ([facility] 07/17/2014    Down's syndrome      Current Outpatient Medications   Medication Sig Dispense Refill    magnesium oxide (MAG-OX) 400 mg tablet Take 400 mg by mouth daily.  carboxymethylcellulose sodium (TheraTears) 0.25 % drop ophthalmic solution Administer 1 Drop to both eyes.  acetaminophen (TylenoL) 325 mg tablet Take  by mouth every four (4) hours as needed for Pain.  atorvastatin (LIPITOR) 40 mg tablet Take 1 Tablet by mouth nightly.  90 Tablet 1    FreeStyle Lashawn 14 Day [place] misc USE TO SCAN SENSOR AT LEAST THREE TIMES DAILY AS DIRECTED 1 Each 0    FreeStyle Lashawn 2 Sensor kit USE TO SCAN SENSOR AT LEAST THREE TIMES DAILY, APPLY AND REPLACE SENSOR EVERY 14 DAYS 1 Kit 1    famotidine (PEPCID) 20 mg tablet Take 1 Tablet by mouth two (2) times daily as needed for Heartburn. 180 Tablet 1    FreeStyle Lashawn 2 Sensor kit USE AS DIRECTED REPLACE EVERY 14 DAYS 1 Kit 2    flash glucose sensor (FreeStyle Lashawn 2 Sensor) kit USE TO SCAN SENSOR AT LEAST THREE TIMES DAILY, APPLY AND REPLACE SENSOR EVERY 14 DAYS 1 Kit 2    azelastine (Astepro) 205.5 mcg (0.15 %) 2 Sprays by Both Nostrils route as needed (Rhrinitis). 3 Each 0    clemastine 2.68 mg tab tablet TAKE 1 TABLET TWICE A  Tablet 1    pantoprazole (PROTONIX) 40 mg tablet Take 1 Tablet by mouth daily. 90 Tablet 1    metFORMIN (GLUCOPHAGE) 500 mg tablet TAKE 1 TABLET DAILY 100 Tablet 0    metroNIDAZOLE (METROGEL) 1 % topical gel Apply  to affected area daily. Use a thin layer to affected areas after washing 45 g 0    pantoprazole (PROTONIX) 40 mg tablet Take 1 Tablet by mouth daily. 30 Tablet 0    clotrimazole (LOTRIMIN) 1 % topical cream Apply  to affected area two (2) times a day. 15 g 0    betamethasone dipropionate (DIPROSONE) 0.05 % topical cream Apply  to affected area two (2) times a day. 15 g 0    glucose blood VI test strips (FreeStyle Precision Aleksandr Strips) strip Check once daily 100 Strip 1    fexofenadine (ALLEGRA) 180 mg tablet Take 180 mg by mouth daily.  cholecalciferol, vitamin D3, (Vitamin D3) 50 mcg (2,000 unit) tab Take  by mouth daily.  aspirin delayed-release 81 mg tablet Take  by mouth daily.  PSEUDOEPHEDRINE-guaiFENesin (Mucinex D)  mg per tablet Take 1 Tab by mouth every twelve (12) hours.  XV-NJ-KZ-Fe-Min-Lycopen-Lutein (CENTRUM) 0.4-162-18 mg Tab Take 1 Tablet by mouth daily.  vit B cmplx & C #11-ca-dha-Q10 (BRAIN MIGHT-DHA-CO Q10) 140-10-10 mg Tab Take 1 Tab by mouth daily.  calcium 600 mg cap Take 1 Tab by mouth daily.       varicella-zoster recombinant, PF, (Shingrix, PF,) 50 mcg/0.5 mL susr injection 0.5mL by IntraMUSCular route once now and then repeat in 2-6 months (Patient not taking: Reported on 5/24/2022) 0.5 mL 1    ondansetron hcl (ZOFRAN, AS HYDROCHLORIDE,) 4 mg tablet Take 1 Tab by mouth every eight (8) hours as needed for Nausea. (Patient not taking: Reported on 5/24/2022) 20 Tab 0     Past Surgical History:   Procedure Laterality Date    COLONOSCOPY N/A 5/31/2018    COLONOSCOPY performed by [facility]ileth Collins [facility] at [facility]OSCOPY    HX CHOLECYSTECTOMY      HX OTHER SURGICAL      convergent strabismus s/p corrective surgery      Lab Results   Component Value Date/Time    WBC 4.0 (L) 02/02/2022 12:29 PM    HGB 14.0 02/02/2022 12:29 PM    HCT 43.8 02/02/2022 12:29 PM    PLATELET 194 65/61/6546 12:29 PM    MCV 91.1 02/02/2022 12:29 PM     Lab Results   Component Value Date/Time    Cholesterol, total 142 02/02/2022 12:29 PM    HDL Cholesterol 41 02/02/2022 12:29 PM    LDL, calculated 58.2 02/02/2022 12:29 PM    Triglyceride 214 (H) 02/02/2022 12:29 PM    CHOL/HDL Ratio 3.5 02/02/2022 12:29 PM     Lab Results   Component Value Date/Time    GFR est non-AA >60 02/02/2022 12:29 PM    GFR est AA >60 02/02/2022 12:29 PM    Creatinine 0.83 02/02/2022 12:29 PM    BUN 12 02/02/2022 12:29 PM    Sodium 140 02/02/2022 12:29 PM    Potassium 4.3 02/02/2022 12:29 PM    Chloride 105 02/02/2022 12:29 PM    CO2 31 02/02/2022 12:29 PM    Magnesium 2.0 05/22/2014 01:45 PM        Review of Systems   Constitutional: Positive for fever and malaise/fatigue. HENT: Positive for congestion. Respiratory: Positive for cough. Negative for shortness of breath. Cardiovascular: Negative for chest pain. Musculoskeletal: Positive for myalgias. Physical Exam  Constitutional:       General: He is not in acute distress. Appearance: Normal appearance. He is not ill-appearing, toxic-appearing or diaphoretic. HENT:      Head: Normocephalic and atraumatic. Eyes:      General:         Right eye: No discharge. Left eye: No discharge. Conjunctiva/sclera: Conjunctivae normal.   Pulmonary:      Effort: No respiratory distress. Neurological:      Mental Status: He is alert and oriented to person, place, and time. Mental status is at baseline. Gait: Gait normal.   Psychiatric:         Mood and Affect: Mood normal.         Behavior: Behavior normal.         ASSESSMENT and PLAN    ICD-10-CM ICD-9-CM    1. Type 2 diabetes mellitus without complication, without long-term current use of insulin ([facility])    U62.3 925.44 METABOLIC PANEL, COMPREHENSIVE      HEMOGLOBIN A1C WITH EAG   Has been controlled on metformin 500 mg daily due for blood work which I ordered   MICROALBUMIN, UR, RAND W/ MICROALB/CREAT RATIO      PSA SCREENING (SCREENING)   2. Medicare annual wellness visit, subsequent  F33.33 O65.0 METABOLIC PANEL, COMPREHENSIVE      HEMOGLOBIN A1C WITH EAG      MICROALBUMIN, UR, RAND W/ MICROALB/CREAT RATIO      PSA SCREENING (SCREENING)   3. Gastroesophageal reflux disease without esophagitis  B07.0 388.47 METABOLIC PANEL, COMPREHENSIVE      HEMOGLOBIN A1C WITH EAG   Improved with Prilosec and Pepcid   MICROALBUMIN, UR, RAND W/ MICROALB/CREAT RATIO      PSA SCREENING (SCREENING)   4. Mixed hyperlipidemia  Z76.9 682.1 METABOLIC PANEL, COMPREHENSIVE      HEMOGLOBIN A1C WITH EAG   Controlled Lipitor   MICROALBUMIN, UR, RAND W/ MICROALB/CREAT RATIO      PSA SCREENING (SCREENING)   5. SERGIO on CPAP  T53.62 600.56 METABOLIC PANEL, COMPREHENSIVE    Z99.89 V46.8 HEMOGLOBIN A1C WITH EAG   Working on compliance with CPAP   MICROALBUMIN, UR, RAND W/ MICROALB/CREAT RATIO      PSA SCREENING (SCREENING)   6.  Deep vein thrombosis (DVT) of proximal lower extremity, unspecified chronicity, unspecified laterality ([facility])  O68.5Z9 396.57 METABOLIC PANEL, COMPREHENSIVE      HEMOGLOBIN A1C WITH EAG   Remote history of DVT now on aspirin daily to prevent recurrent blood clot   MICROALBUMIN, UR, RAND W/ MICROALB/CREAT RATIO      PSA SCREENING (SCREENING)   7. Down's syndrome N38.3 948.1 METABOLIC PANEL, COMPREHENSIVE      HEMOGLOBIN A1C WITH EAG   Cared for by sister currently   MICROALBUMIN, UR, RAND W/ MICROALB/CREAT RATIO      PSA SCREENING (SCREENING)   8. COVID-19  S29.6 606.56 METABOLIC PANEL, COMPREHENSIVE      HEMOGLOBIN A1C WITH EAG   Completing course o paxlovid   Stable   MICROALBUMIN, UR, RAND W/ MICROALB/CREAT RATIO      PSA SCREENING (SCREENING)   9. Benign prostatic hyperplasia without lower urinary tract symptoms  K10.2 784.47 METABOLIC PANEL, COMPREHENSIVE      HEMOGLOBIN A1C WITH EAG   Check PSA   MICROALBUMIN, UR, RAND W/ MICROALB/CREAT RATIO      PSA SCREENING (SCREENING)   10. Cough       Has a history of chronic cough thought related to reflux and allergies he is on Mucinex and Pepcid and Protonix follows with both ENT and pulmonary also has clemastine for his chronic cough R05.9 786.2         Depression screen reviewed and negative     Scribed by Wayne Parikh of [facility], as dictated by Dr. Rocio Saunders. Current diagnosis and concerns discussed with pt at length. Pt understands risks and benefits or current treatment plan and medications, and accepts the treatment and medication with any possible risks. Pt asks appropriate questions, which were answered. Pt was instructed to call with any concerns or problems. I have reviewed the note documented by the scribe. The services provided are my own. The documentation is accurate     Leigh Varela, was evaluated through a synchronous (real-time) audio-video encounter. The patient (or guardian if applicable) is aware that this is a billable service. Verbal consent to proceed has been obtained. The visit was conducted pursuant to the emergency declaration under the [facility], [address] [place] [address] authority and the [employer] and [employer] General Act. Patient identification was verified, and a caregiver was present when appropriate.  The patient was located at home in a state where the provider was licensed to provide care.

## 2022-05-24 ENCOUNTER — VIRTUAL VISIT (OUTPATIENT)
Dept: INTERNAL MEDICINE CLINIC | Age: 52
End: 2022-05-24
Payer: MEDICARE

## 2022-05-24 DIAGNOSIS — Q90.9 DOWN'S SYNDROME: ICD-10-CM

## 2022-05-24 DIAGNOSIS — N40.0 BENIGN PROSTATIC HYPERPLASIA WITHOUT LOWER URINARY TRACT SYMPTOMS: ICD-10-CM

## 2022-05-24 DIAGNOSIS — G47.33 OSA ON CPAP: ICD-10-CM

## 2022-05-24 DIAGNOSIS — R05.9 COUGH: ICD-10-CM

## 2022-05-24 DIAGNOSIS — K21.9 GASTROESOPHAGEAL REFLUX DISEASE WITHOUT ESOPHAGITIS: ICD-10-CM

## 2022-05-24 DIAGNOSIS — U07.1 COVID-19: ICD-10-CM

## 2022-05-24 DIAGNOSIS — I82.4Y9 DEEP VEIN THROMBOSIS (DVT) OF PROXIMAL LOWER EXTREMITY, UNSPECIFIED CHRONICITY, UNSPECIFIED LATERALITY (HCC): ICD-10-CM

## 2022-05-24 DIAGNOSIS — Z00.00 MEDICARE ANNUAL WELLNESS VISIT, SUBSEQUENT: ICD-10-CM

## 2022-05-24 DIAGNOSIS — E78.2 MIXED HYPERLIPIDEMIA: ICD-10-CM

## 2022-05-24 DIAGNOSIS — Z99.89 OSA ON CPAP: ICD-10-CM

## 2022-05-24 DIAGNOSIS — E11.9 TYPE 2 DIABETES MELLITUS WITHOUT COMPLICATION, WITHOUT LONG-TERM CURRENT USE OF INSULIN (HCC): Primary | ICD-10-CM

## 2022-05-24 PROCEDURE — G8417 CALC BMI ABV UP PARAM F/U: HCPCS | Performed by: INTERNAL MEDICINE

## 2022-05-24 PROCEDURE — 2022F DILAT RTA XM EVC RTNOPTHY: CPT | Performed by: INTERNAL MEDICINE

## 2022-05-24 PROCEDURE — G8427 DOCREV CUR MEDS BY ELIG CLIN: HCPCS | Performed by: INTERNAL MEDICINE

## 2022-05-24 PROCEDURE — G8510 SCR DEP NEG, NO PLAN REQD: HCPCS | Performed by: INTERNAL MEDICINE

## 2022-05-24 PROCEDURE — G0439 PPPS, SUBSEQ VISIT: HCPCS | Performed by: INTERNAL MEDICINE

## 2022-05-24 PROCEDURE — 99214 OFFICE O/P EST MOD 30 MIN: CPT | Performed by: INTERNAL MEDICINE

## 2022-05-24 PROCEDURE — 3017F COLORECTAL CA SCREEN DOC REV: CPT | Performed by: INTERNAL MEDICINE

## 2022-05-24 PROCEDURE — 3044F HG A1C LEVEL LT 7.0%: CPT | Performed by: INTERNAL MEDICINE

## 2022-05-24 RX ORDER — LANOLIN ALCOHOL/MO/W.PET/CERES
400 CREAM (GRAM) TOPICAL DAILY
COMMUNITY

## 2022-05-24 RX ORDER — CARBOXYMETHYLCELLULOSE SODIUM 2.5 MG/ML
1 SOLUTION/ DROPS OPHTHALMIC
COMMUNITY

## 2022-05-24 RX ORDER — ACETAMINOPHEN 325 MG/1
TABLET ORAL
COMMUNITY

## 2022-05-24 NOTE — PATIENT INSTRUCTIONS
Medicare Wellness Visit, Male    The best way to live healthy is to have a lifestyle where you eat a well-balanced diet, exercise regularly, limit alcohol use, and quit all forms of tobacco/nicotine, if applicable. Regular preventive services are another way to keep healthy. Preventive services (vaccines, screening tests, monitoring & exams) can help personalize your care plan, which helps you manage your own care. Screening tests can find health problems at the earliest stages, when they are easiest to treat. [facility] follows the current, evidence-based guidelines published by the [facility] (RUSTSTF) when recommending preventive services for our patients. Because we follow these guidelines, sometimes recommendations change over time as research supports it. (For example, a prostate screening blood test is no longer routinely recommended for men with no symptoms). Of course, you and your doctor may decide to screen more often for some diseases, based on your risk and co-morbidities (chronic disease you are already diagnosed with). Preventive services for you include:  - Medicare offers their members a free annual wellness visit, which is time for you and your primary care provider to discuss and plan for your preventive service needs. Take advantage of this benefit every year!  -All adults over age 72 should receive the recommended pneumonia vaccines. Current USPSTF guidelines recommend a series of two vaccines for the best pneumonia protection.   -All adults should have a flu vaccine yearly and tetanus vaccine every 10 years.  -All adults age 48 and older should receive the shingles vaccines (series of two vaccines).        -All adults age 38-68 who are overweight should have a diabetes screening test once every three years.   -Other screening tests & preventive services for persons with diabetes include: an eye exam to screen for diabetic retinopathy, a kidney function test, a foot exam, and stricter control over your cholesterol.   -Cardiovascular screening for adults with routine risk involves an electrocardiogram (ECG) at intervals determined by the provider.   -Colorectal cancer screening should be done for adults age 54-65 with no increased risk factors for colorectal cancer. There are a number of acceptable methods of screening for this type of cancer. Each test has its own benefits and drawbacks. Discuss with your provider what is most appropriate for you during your annual wellness visit. The different tests include: colonoscopy (considered the best screening method), a fecal occult blood test, a fecal DNA test, and sigmoidoscopy.  -All adults born between [facility] should be screened once for Hepatitis C.  -An Abdominal Aortic Aneurysm (AAA) Screening is recommended for men age 73-68 who has ever smoked in their lifetime.      Here is a list of your current Health Maintenance items (your personalized list of preventive services) with a due date:  Health Maintenance Due   Topic Date Due    Shingles Vaccine (2 of 2) 08/18/2021    Pneumococcal Vaccine (2 - PCV) 02/16/2022    Annual Well Visit  05/26/2022

## 2022-05-24 NOTE — PROGRESS NOTES
This is the Subsequent Medicare Annual Wellness Exam, performed 12 months or more after the Initial AWV or the last Subsequent AWV    I have reviewed the patient's medical history in detail and updated the computerized patient record. Assessment/Plan   Education and counseling provided:  Are appropriate based on today's review and evaluation    1. Medicare annual wellness visit, subsequent       Depression Risk Factor Screening     3 most recent PHQ Screens 5/24/2022   Little interest or pleasure in doing things Not at all   Feeling down, depressed, irritable, or hopeless Not at all   Total Score PHQ 2 0       Alcohol & Drug Abuse Risk Screen    Do you average more than 2 drinks per night or 14 drinks a week: No    On any one occasion in the past three months have you have had more than 4 drinks containing alcohol:  No          Functional Ability and Level of Safety    Hearing: Hearing is good. Activities of Daily Living: The home contains: handrails and grab bars  Patient needs help with:  transportation, shopping, preparing meals, laundry, housework, managing medications and managing money      Ambulation: with no difficulty     Fall Risk:  Fall Risk Assessment, last 12 mths 5/24/2022   Able to walk? Yes   Fall in past 12 months?  0      Abuse Screen:  Patient is not abused   lives w parents, sister Brian Vick helps as well     Archana Ibarra caring for him currently     Cognitive Screening    Has your family/caregiver stated any concerns about your memory: yes - developmental delays     Cognitive Screening: developmental delays    Health Maintenance Due     Health Maintenance Due   Topic Date Due    Shingrix Vaccine Age 49> (2 of 2) 08/18/2021    Pneumococcal 0-64 years (2 - PCV) 02/16/2022    Medicare Yearly Exam  05/26/2022       Patient Care Team   Patient Care Team:  Diandra Lam MD as PCP - General (Internal Medicine Physician)  Diandra Lam MD as PCP - [facility] Empaneled Provider  Herminia Mendez Mercedez Duarte MD as Physician (Sleep Medicine Physician)  Dmitry Farley MD (Pulmonary Disease)  Ray Morales, OD (Optometry)  Felisa Nicholson DPM (Podiatry)  Kyle Hernandez MD as Physician (Cardiovascular Disease Physician)  Shamar Hooper MD (Inactive) (Otolaryngology)    History     Patient Active Problem List   Diagnosis Code    Down's syndrome Q90.9    DVT (deep venous thrombosis) ([facility] I82.409    Gastroesophageal reflux disease without esophagitis K21.9    Mixed hyperlipidemia E78.2    Type 2 diabetes mellitus without complication, without long-term current use of insulin ([address] E11.9    SERGIO on CPAP G47.33, Z99.89     Past Medical History:   Diagnosis Date    Arthritis     scoliosis    Down's syndrome     DVT (deep venous thrombosis) ([facility] 7/17/2014    Gastrointestinal disorder     GERD, Robin's Esophagus    Other ill-defined conditions(799.89)     Down Syndrom with mild mental retardation    Other ill-defined conditions(799.89)     mixed hyperlipidemia/hypercholesteremia    Other ill-defined conditions(799.89)     myopia    Other ill-defined conditions(799.89)     sleep apnea, uses cpap at night    Sleep apnea     uses cpap    Thromboembolus [facility])       Past Surgical History:   Procedure Laterality Date    COLONOSCOPY N/A 5/31/2018    [facility] Jose Martin [facility] ENDOSCOPY    HX CHOLECYSTECTOMY      HX OTHER SURGICAL      convergent strabismus s/p corrective surgery     Current Outpatient Medications   Medication Sig Dispense Refill    nirmatrelvir-ritonavir (Paxlovid, EUA,) 150 mg x 2- 100 mg tablet Follow package directions 1 Box 0    atorvastatin (LIPITOR) 40 mg tablet Take 1 Tablet by mouth nightly.  90 Tablet 1    famotidine (PEPCID) 20 mg tablet TAKE 1 TABLET BY MOUTH TWICE DAILY AS NEEDED FOR HEARTBURN 60 Tablet 0    FreeStyle Lashawn 14 Day [place] misc USE TO SCAN SENSOR AT LEAST THREE TIMES DAILY AS DIRECTED 1 Each 0    FreeStyle Lashawn 2 Sensor kit USE TO SCAN SENSOR AT LEAST THREE TIMES DAILY, APPLY AND REPLACE SENSOR EVERY 14 DAYS 1 Kit 1    famotidine (PEPCID) 20 mg tablet Take 1 Tablet by mouth two (2) times daily as needed for Heartburn. 180 Tablet 1    FreeStyle Lashawn 2 Sensor kit USE AS DIRECTED REPLACE EVERY 14 DAYS 1 Kit 2    flash glucose sensor (FreeStyle Lashawn 2 Sensor) kit USE TO SCAN SENSOR AT LEAST THREE TIMES DAILY, APPLY AND REPLACE SENSOR EVERY 14 DAYS 1 Kit 2    azelastine (Astepro) 205.5 mcg (0.15 %) 2 Sprays by Both Nostrils route as needed (Rhrinitis). 3 Each 0    clemastine 2.68 mg tab tablet TAKE 1 TABLET TWICE A  Tablet 1    pantoprazole (PROTONIX) 40 mg tablet Take 1 Tablet by mouth daily. 90 Tablet 1    metFORMIN (GLUCOPHAGE) 500 mg tablet TAKE 1 TABLET DAILY 100 Tablet 0    metroNIDAZOLE (METROGEL) 1 % topical gel Apply  to affected area daily. Use a thin layer to affected areas after washing 45 g 0    pantoprazole (PROTONIX) 40 mg tablet Take 1 Tablet by mouth daily. 30 Tablet 0    clotrimazole (LOTRIMIN) 1 % topical cream Apply  to affected area two (2) times a day. 15 g 0    betamethasone dipropionate (DIPROSONE) 0.05 % topical cream Apply  to affected area two (2) times a day. 15 g 0    varicella-zoster recombinant, PF, (Shingrix, PF,) 50 mcg/0.5 mL susr injection 0.5mL by IntraMUSCular route once now and then repeat in 2-6 months 0.5 mL 1    glucose blood VI test strips (FreeStyle Precision Aleksandr Strips) strip Check once daily 100 Strip 1    fexofenadine (ALLEGRA) 180 mg tablet Take 180 mg by mouth daily.  cholecalciferol, vitamin D3, (Vitamin D3) 50 mcg (2,000 unit) tab Take  by mouth daily.  aspirin delayed-release 81 mg tablet Take  by mouth daily.  PSEUDOEPHEDRINE-guaiFENesin (Mucinex D)  mg per tablet Take 1 Tab by mouth every twelve (12) hours.  ondansetron hcl (ZOFRAN, AS HYDROCHLORIDE,) 4 mg tablet Take 1 Tab by mouth every eight (8) hours as needed for Nausea.  20 Tab 0  CU-TX-LZ-Fe-Min-Lycopen-Lutein (CENTRUM) 0.4-162-18 mg Tab Take 1 Tab by mouth daily. (Patient not taking: Reported on 2/2/2022)      vit B cmplx & C #11-ca-dha-Q10 (BRAIN MIGHT-DHA-CO Q10) 140-10-10 mg Tab Take 1 Tab by mouth daily.  calcium 600 mg cap Take 1 Tab by mouth daily. No Known Allergies    Family History   Problem Relation Age of Onset    Hypertension Mother     Diabetes Mother    Alena Pro Asthma Father     Diabetes Father     Asthma Sister     Bleeding Prob Maternal Grandfather     OSTEOARTHRITIS Paternal Grandmother     Alcohol abuse Paternal Grandmother     Lung Disease Paternal Grandmother     Hypertension Paternal Grandmother     Alcohol abuse Paternal Grandfather     Heart Disease Paternal Grandfather      Social History     Tobacco Use    Smoking status: Never Smoker    Smokeless tobacco: Never Used   Substance Use Topics    Alcohol use: No     Alcohol/week: 0.0 standard drinks     ACP planning begun today, SDM is. Parents and sister        Colonoscopy: 5/18 sara 10 yrs, has had egd as well   PSA:  5/21 0.4 due  AAA screen not needed    Tdap: 10/19  Pneumovax: 2/21  Prev 13 not yet needed  Shingrix: 1st dose, due for 2nd dose   Flu shot: Fall 2021    Ophtho: 12/21 amanda tower     A1c:  2/22 6.1 due  Lipids: 2/22 LDL 58 annual     Hep C screen: 5/21 negative  Covid: both + booster (moderna)    Medication reconciliation completed by MA and reviewed by me. Medical/surgical/social/family history reviewed and updated by me. Patient provided AVS and preventative screening table. Patient verbalized understanding of all information discussed.       Unique Adames MD

## 2022-05-28 RX ORDER — FLASH GLUCOSE SENSOR
KIT MISCELLANEOUS
Qty: 1 KIT | Refills: 3 | Status: SHIPPED | OUTPATIENT
Start: 2022-05-28 | End: 2022-07-22

## 2022-06-12 DIAGNOSIS — K21.9 GASTROESOPHAGEAL REFLUX DISEASE WITHOUT ESOPHAGITIS: ICD-10-CM

## 2022-06-12 RX ORDER — PANTOPRAZOLE SODIUM 40 MG/1
40 TABLET, DELAYED RELEASE ORAL DAILY
Qty: 30 TABLET | Refills: 0 | Status: SHIPPED | OUTPATIENT
Start: 2022-06-12 | End: 2022-08-07

## 2022-06-12 RX ORDER — METRONIDAZOLE 10 MG/G
GEL TOPICAL
Qty: 55 G | Refills: 0 | Status: SHIPPED | OUTPATIENT
Start: 2022-06-12 | End: 2022-08-02

## 2022-06-12 RX ORDER — FAMOTIDINE 20 MG/1
TABLET, FILM COATED ORAL
Qty: 60 TABLET | Refills: 2 | Status: SHIPPED | OUTPATIENT
Start: 2022-06-12 | End: 2022-09-10

## 2022-06-13 ENCOUNTER — LAB ONLY (OUTPATIENT)
Dept: INTERNAL MEDICINE CLINIC | Age: 52
End: 2022-06-13

## 2022-06-13 DIAGNOSIS — G47.33 OSA ON CPAP: ICD-10-CM

## 2022-06-13 DIAGNOSIS — U07.1 COVID-19: ICD-10-CM

## 2022-06-13 DIAGNOSIS — Z99.89 OSA ON CPAP: ICD-10-CM

## 2022-06-13 DIAGNOSIS — I82.4Y9 DEEP VEIN THROMBOSIS (DVT) OF PROXIMAL LOWER EXTREMITY, UNSPECIFIED CHRONICITY, UNSPECIFIED LATERALITY (HCC): ICD-10-CM

## 2022-06-13 DIAGNOSIS — Z00.00 MEDICARE ANNUAL WELLNESS VISIT, SUBSEQUENT: ICD-10-CM

## 2022-06-13 DIAGNOSIS — E78.2 MIXED HYPERLIPIDEMIA: ICD-10-CM

## 2022-06-13 DIAGNOSIS — Q90.9 DOWN'S SYNDROME: ICD-10-CM

## 2022-06-13 DIAGNOSIS — E11.9 TYPE 2 DIABETES MELLITUS WITHOUT COMPLICATION, WITHOUT LONG-TERM CURRENT USE OF INSULIN (HCC): ICD-10-CM

## 2022-06-13 DIAGNOSIS — N40.0 BENIGN PROSTATIC HYPERPLASIA WITHOUT LOWER URINARY TRACT SYMPTOMS: ICD-10-CM

## 2022-06-13 DIAGNOSIS — K21.9 GASTROESOPHAGEAL REFLUX DISEASE WITHOUT ESOPHAGITIS: ICD-10-CM

## 2022-06-13 RX ORDER — FLASH GLUCOSE SENSOR
KIT MISCELLANEOUS
Qty: 1 KIT | Refills: 2 | Status: SHIPPED | OUTPATIENT
Start: 2022-06-13 | End: 2022-08-20

## 2022-06-14 LAB
ALBUMIN SERPL-MCNC: 3.8 G/DL (ref 3.5–5)
ALBUMIN/GLOB SERPL: 1.1 {RATIO} (ref 1.1–2.2)
ALP SERPL-CCNC: 87 U/L (ref 45–117)
ALT SERPL-CCNC: 49 U/L (ref 12–78)
ANION GAP SERPL CALC-SCNC: 7 MMOL/L (ref 5–15)
AST SERPL-CCNC: 26 U/L (ref 15–37)
BILIRUB SERPL-MCNC: 0.8 MG/DL (ref 0.2–1)
BUN SERPL-MCNC: 13 MG/DL (ref 6–20)
BUN/CREAT SERPL: 14 (ref 12–20)
CALCIUM SERPL-MCNC: 9 MG/DL (ref 8.5–10.1)
CHLORIDE SERPL-SCNC: 103 MMOL/L (ref 97–108)
CO2 SERPL-SCNC: 29 MMOL/L (ref 21–32)
CREAT SERPL-MCNC: 0.93 MG/DL (ref 0.7–1.3)
CREAT UR-MCNC: 30.4 MG/DL
EST. AVERAGE GLUCOSE BLD GHB EST-MCNC: 126 MG/DL
GLOBULIN SER CALC-MCNC: 3.4 G/DL (ref 2–4)
GLUCOSE SERPL-MCNC: 101 MG/DL (ref 65–100)
HBA1C MFR BLD: 6 % (ref 4–5.6)
MICROALBUMIN UR-MCNC: <0.5 MG/DL
MICROALBUMIN/CREAT UR-RTO: <16 MG/G (ref 0–30)
POTASSIUM SERPL-SCNC: 4.4 MMOL/L (ref 3.5–5.1)
PROT SERPL-MCNC: 7.2 G/DL (ref 6.4–8.2)
PSA SERPL-MCNC: 0.2 NG/ML (ref 0.01–4)
SODIUM SERPL-SCNC: 139 MMOL/L (ref 136–145)

## 2022-06-16 ENCOUNTER — HOSPITAL ENCOUNTER (OUTPATIENT)
Dept: GENERAL RADIOLOGY | Age: 52
Discharge: HOME | End: 2022-06-16
Payer: MEDICARE

## 2022-06-16 ENCOUNTER — TRANSCRIBE ORDER (OUTPATIENT)
Dept: REGISTRATION | Age: 52
End: 2022-06-16

## 2022-06-16 DIAGNOSIS — R05.3 CHRONIC COUGH: Primary | ICD-10-CM

## 2022-06-16 DIAGNOSIS — R05.3 CHRONIC COUGH: ICD-10-CM

## 2022-06-16 PROCEDURE — 71046 X-RAY EXAM CHEST 2 VIEWS: CPT

## 2022-07-22 RX ORDER — FLASH GLUCOSE SENSOR
KIT MISCELLANEOUS
Qty: 1 KIT | Refills: 1 | Status: SHIPPED | OUTPATIENT
Start: 2022-07-22

## 2022-08-02 RX ORDER — METRONIDAZOLE 10 MG/G
GEL TOPICAL
Qty: 55 G | Refills: 0 | Status: SHIPPED | OUTPATIENT
Start: 2022-08-02

## 2022-08-03 ENCOUNTER — TELEPHONE (OUTPATIENT)
Dept: INTERNAL MEDICINE CLINIC | Age: 52
End: 2022-08-03

## 2022-08-03 NOTE — TELEPHONE ENCOUNTER
Unable to leave message advising patient's sister Nohelia Moore that Dr. Maite Rojo is currently not accepting new patients at this time. Voicemail full.

## 2022-08-03 NOTE — TELEPHONE ENCOUNTER
Patients father called, returning missed call. [employer] verified. Per previous psr note, this psr advised caller that pt sister requested to change pcp to Dr Víctor Jamison, however Dr Víctor Jamison is not accepting new patients at this time. Caller states understanding. Please advised.

## 2022-08-07 RX ORDER — PANTOPRAZOLE SODIUM 40 MG/1
40 TABLET, DELAYED RELEASE ORAL DAILY
Qty: 30 TABLET | Refills: 0 | Status: SHIPPED | OUTPATIENT
Start: 2022-08-07

## 2022-08-11 RX ORDER — CLEMASTINE FUMARATE 2.68 MG/1
TABLET ORAL
Qty: 180 TABLET | Refills: 1 | Status: SHIPPED | OUTPATIENT
Start: 2022-08-11

## 2022-08-19 ENCOUNTER — TRANSCRIBE ORDER (OUTPATIENT)
Dept: SCHEDULING | Age: 52
End: 2022-08-19

## 2022-08-19 DIAGNOSIS — R13.10 DYSPHAGIA: ICD-10-CM

## 2022-08-19 DIAGNOSIS — R14.3 FLATULENCE SYMPTOM: ICD-10-CM

## 2022-08-19 DIAGNOSIS — K21.9 ACID REFLUX: ICD-10-CM

## 2022-08-19 DIAGNOSIS — Z83.71 FAMILY HISTORY OF COLONIC POLYPS: ICD-10-CM

## 2022-08-19 DIAGNOSIS — R19.7 DIARRHEA: Primary | ICD-10-CM

## 2022-08-20 RX ORDER — FLASH GLUCOSE SENSOR
KIT MISCELLANEOUS
Qty: 1 KIT | Refills: 5 | Status: SHIPPED | OUTPATIENT
Start: 2022-08-20

## 2022-09-06 ENCOUNTER — HOSPITAL ENCOUNTER (OUTPATIENT)
Dept: GENERAL RADIOLOGY | Age: 52
Discharge: HOME | End: 2022-09-06
Attending: NURSE PRACTITIONER
Payer: MEDICARE

## 2022-09-06 DIAGNOSIS — R14.3 FLATULENCE SYMPTOM: ICD-10-CM

## 2022-09-06 DIAGNOSIS — K21.9 ACID REFLUX: ICD-10-CM

## 2022-09-06 DIAGNOSIS — Z83.71 FAMILY HISTORY OF COLONIC POLYPS: ICD-10-CM

## 2022-09-06 DIAGNOSIS — R13.10 DYSPHAGIA: ICD-10-CM

## 2022-09-06 DIAGNOSIS — R19.7 DIARRHEA: ICD-10-CM

## 2022-09-06 PROCEDURE — 74220 X-RAY XM ESOPHAGUS 1CNTRST: CPT

## 2022-09-09 DIAGNOSIS — K21.9 GASTROESOPHAGEAL REFLUX DISEASE WITHOUT ESOPHAGITIS: ICD-10-CM

## 2022-09-10 RX ORDER — FAMOTIDINE 20 MG/1
TABLET, FILM COATED ORAL
Qty: 60 TABLET | Refills: 2 | Status: SHIPPED | OUTPATIENT
Start: 2022-09-10

## 2022-09-12 NOTE — TELEPHONE ENCOUNTER
Called and offered to schedule patient for an appointment. Patient declined and stated that he has switched PCPs.

## 2023-05-01 ENCOUNTER — ANESTHESIA (OUTPATIENT)
Dept: ENDOSCOPY | Age: 53
End: 2023-05-01
Payer: MEDICARE

## 2023-05-01 ENCOUNTER — HOSPITAL ENCOUNTER (OUTPATIENT)
Age: 53
Setting detail: OUTPATIENT SURGERY
Discharge: HOME | End: 2023-05-01
Attending: INTERNAL MEDICINE | Admitting: INTERNAL MEDICINE
Payer: MEDICARE

## 2023-05-01 ENCOUNTER — ANESTHESIA EVENT (OUTPATIENT)
Dept: ENDOSCOPY | Age: 53
End: 2023-05-01
Payer: MEDICARE

## 2023-05-01 VITALS
HEART RATE: 74 BPM | HEIGHT: 63 IN | TEMPERATURE: 98.2 F | SYSTOLIC BLOOD PRESSURE: 114 MMHG | RESPIRATION RATE: 19 BRPM | BODY MASS INDEX: 31.89 KG/M2 | OXYGEN SATURATION: 98 % | DIASTOLIC BLOOD PRESSURE: 78 MMHG | WEIGHT: 180 LBS

## 2023-05-01 PROCEDURE — 74011250636 HC RX REV CODE- 250/636: Performed by: INTERNAL MEDICINE

## 2023-05-01 PROCEDURE — 74011250636 HC RX REV CODE- 250/636: Performed by: NURSE ANESTHETIST, CERTIFIED REGISTERED

## 2023-05-01 PROCEDURE — 77030019988 HC FCPS ENDOSC DISP BSC -B: Performed by: INTERNAL MEDICINE

## 2023-05-01 PROCEDURE — 74011000250 HC RX REV CODE- 250: Performed by: NURSE ANESTHETIST, CERTIFIED REGISTERED

## 2023-05-01 PROCEDURE — 76060000032 HC ANESTHESIA 0.5 TO 1 HR: Performed by: INTERNAL MEDICINE

## 2023-05-01 PROCEDURE — 88305 TISSUE EXAM BY PATHOLOGIST: CPT

## 2023-05-01 PROCEDURE — 77030020268 HC MISC GENERAL SUPPLY: Performed by: INTERNAL MEDICINE

## 2023-05-01 PROCEDURE — 77030021593 HC FCPS BIOP ENDOSC BSC -A: Performed by: INTERNAL MEDICINE

## 2023-05-01 PROCEDURE — C1769 GUIDE WIRE: HCPCS | Performed by: INTERNAL MEDICINE

## 2023-05-01 PROCEDURE — 76040000007: Performed by: INTERNAL MEDICINE

## 2023-05-01 PROCEDURE — 2709999900 HC NON-CHARGEABLE SUPPLY: Performed by: INTERNAL MEDICINE

## 2023-05-01 PROCEDURE — 88342 IMHCHEM/IMCYTCHM 1ST ANTB: CPT

## 2023-05-01 RX ORDER — SODIUM CHLORIDE 0.9 % (FLUSH) 0.9 %
5-40 SYRINGE (ML) INJECTION EVERY 8 HOURS
Status: DISCONTINUED | OUTPATIENT
Start: 2023-05-01 | End: 2023-05-01 | Stop reason: HOSPADM

## 2023-05-01 RX ORDER — FLUMAZENIL 0.1 MG/ML
0.2 INJECTION INTRAVENOUS
Status: DISCONTINUED | OUTPATIENT
Start: 2023-05-01 | End: 2023-05-01 | Stop reason: HOSPADM

## 2023-05-01 RX ORDER — ATROPINE SULFATE 0.1 MG/ML
0.5 INJECTION INTRAVENOUS
Status: DISCONTINUED | OUTPATIENT
Start: 2023-05-01 | End: 2023-05-01 | Stop reason: HOSPADM

## 2023-05-01 RX ORDER — PROPOFOL 10 MG/ML
INJECTION, EMULSION INTRAVENOUS AS NEEDED
Status: DISCONTINUED | OUTPATIENT
Start: 2023-05-01 | End: 2023-05-01 | Stop reason: HOSPADM

## 2023-05-01 RX ORDER — EPHEDRINE SULFATE/0.9% NACL/PF 50 MG/5 ML
SYRINGE (ML) INTRAVENOUS AS NEEDED
Status: DISCONTINUED | OUTPATIENT
Start: 2023-05-01 | End: 2023-05-01 | Stop reason: HOSPADM

## 2023-05-01 RX ORDER — EPINEPHRINE 0.1 MG/ML
1 INJECTION INTRACARDIAC; INTRAVENOUS
Status: DISCONTINUED | OUTPATIENT
Start: 2023-05-01 | End: 2023-05-01 | Stop reason: HOSPADM

## 2023-05-01 RX ORDER — DEXTROMETHORPHAN/PSEUDOEPHED 2.5-7.5/.8
1.2 DROPS ORAL
Status: DISCONTINUED | OUTPATIENT
Start: 2023-05-01 | End: 2023-05-01 | Stop reason: HOSPADM

## 2023-05-01 RX ORDER — SODIUM CHLORIDE 0.9 % (FLUSH) 0.9 %
5-40 SYRINGE (ML) INJECTION AS NEEDED
Status: DISCONTINUED | OUTPATIENT
Start: 2023-05-01 | End: 2023-05-01 | Stop reason: HOSPADM

## 2023-05-01 RX ORDER — LIDOCAINE HYDROCHLORIDE 20 MG/ML
INJECTION, SOLUTION EPIDURAL; INFILTRATION; INTRACAUDAL; PERINEURAL AS NEEDED
Status: DISCONTINUED | OUTPATIENT
Start: 2023-05-01 | End: 2023-05-01 | Stop reason: HOSPADM

## 2023-05-01 RX ORDER — SODIUM CHLORIDE 9 MG/ML
25 INJECTION, SOLUTION INTRAVENOUS CONTINUOUS
Status: DISCONTINUED | OUTPATIENT
Start: 2023-05-01 | End: 2023-05-01 | Stop reason: HOSPADM

## 2023-05-01 RX ORDER — NALOXONE HYDROCHLORIDE 0.4 MG/ML
0.4 INJECTION, SOLUTION INTRAMUSCULAR; INTRAVENOUS; SUBCUTANEOUS
Status: DISCONTINUED | OUTPATIENT
Start: 2023-05-01 | End: 2023-05-01 | Stop reason: HOSPADM

## 2023-05-01 RX ADMIN — PROPOFOL 50 MG: 10 INJECTION, EMULSION INTRAVENOUS at 11:03

## 2023-05-01 RX ADMIN — PROPOFOL 50 MG: 10 INJECTION, EMULSION INTRAVENOUS at 11:16

## 2023-05-01 RX ADMIN — PROPOFOL 100 MG: 10 INJECTION, EMULSION INTRAVENOUS at 10:59

## 2023-05-01 RX ADMIN — LIDOCAINE HYDROCHLORIDE 60 MG: 20 INJECTION, SOLUTION INTRAVENOUS at 10:59

## 2023-05-01 RX ADMIN — Medication 10 MG: at 11:22

## 2023-05-01 RX ADMIN — PROPOFOL 50 MG: 10 INJECTION, EMULSION INTRAVENOUS at 11:12

## 2023-05-01 RX ADMIN — PROPOFOL 50 MG: 10 INJECTION, EMULSION INTRAVENOUS at 11:01

## 2023-05-01 RX ADMIN — PROPOFOL 50 MG: 10 INJECTION, EMULSION INTRAVENOUS at 11:19

## 2023-05-01 RX ADMIN — PROPOFOL 50 MG: 10 INJECTION, EMULSION INTRAVENOUS at 11:09

## 2023-05-01 RX ADMIN — SODIUM CHLORIDE 25 ML/HR: 9 INJECTION, SOLUTION INTRAVENOUS at 10:51

## 2023-05-01 RX ADMIN — LIDOCAINE HYDROCHLORIDE 40 MG: 20 INJECTION, SOLUTION INTRAVENOUS at 10:58

## 2023-05-01 RX ADMIN — PROPOFOL 50 MG: 10 INJECTION, EMULSION INTRAVENOUS at 11:06

## 2023-05-01 NOTE — PROCEDURES
NAME:  Hafsa Clarke   :   1970   MRN:   [id number]     Date/Time:  2023 11:06 AM    Esophagogastroduodenoscopy (EGD) Procedure Note    Procedure: Esophagogastroduodenoscopy with biopsy, esophageal dilation    Indication: Dysphagia, GERD  Pre-operative Diagnosis: see indication above  Post-operative Diagnosis: see findings below  :  Mirian Ardon MD  Referring Provider:   -None    Exam:  Airway: clear, no airway problems anticipated  Heart: RRR, without gallops or rubs  Lungs: clear bilaterally without wheezes, crackles, or rhonchi  Abdomen: soft, nontender, nondistended, bowel sounds present  Mental Status: awake, alert and oriented to person, place and time     Anethesia/Sedation:  MAC anesthesia Propofol  Procedure Details   After informed consent was obtained for the procedure, with all risks and benefits of procedure explained the patient was taken to the endoscopy suite and placed in the left lateral decubitus position. Following sequential administration of sedation as per above, the [id number] gastroscope was inserted into the mouth and advanced under direct vision to second portion of the duodenum. A careful inspection was made as the gastroscope was withdrawn, including a retroflexed view of the proximal stomach; findings and interventions are described below. Findings:   OROPHARYNX: Cords and pyriform recesses normal.   ESOPHAGUS: mild linear furrowing of mucosa, biopsied. Mild erythema at GEJ. STOMACH: The fundus on antegrade and retroflex views with a small sliding, 1 cm hiatal hernia. The body, antrum, and pylorus are diffusely erythematous, biopsied. DUODENUM: The bulb, second and third portions are normal. Biopsied. Therapies:  esophageal dilation with savary sizes 48 fr  biopsy of esophagus  biopsy of stomach body  biopsy of duodenal bulb, 2nd portion    Specimens: duodenum, gastric, GE junction    EBL:  less than 5 mL.          Complications:   None; patient tolerated the procedure well. Impression:  -- mild gastritis, biopsied  -- small hiatal hernia  -- normal duodenum, biopsied to rule out celiac  -- mild reflux esophagitis, biopsied to also evaluate for eosinophilia  -- empiric dilation to 48 fr over the guidewire.     Recommendations:  -- await pathology  -- await dilation effect  -- proceed to colonoscopy    Discharge disposition:  Home in the company of  when able to ambulate    Garett Ambrosio MD

## 2023-05-01 NOTE — DISCHARGE INSTRUCTIONS
Dontrell Lopez  [id number]  1970    EGD and COLONOSCOPY DISCHARGE INSTRUCTIONS  Discomfort:  Sore throat- throat lozenges or warm salt water gargle  redness at IV site- apply warm compress to area; if redness or soreness persist- contact your physician  Gaseous discomfort- walking, belching will help relieve any discomfort  You may not operate a vehicle for 12 hours  You may not engage in an occupation involving machinery or appliances for rest of today  You may not drink alcoholic beverages for at least 12 hours  Avoid making any critical decisions for at least 24 hour  DIET  You may have minimal sips at this time-- do not eat or drink for two hours. You may eat and drink after you wake up  You may resume your regular diet - however -  remember your colon is empty and a heavy meal will produce gas. Avoid these foods:  vegetables, fried / greasy foods, carbonated drinks    MEDICATIONS:  See attached    ACTIVITY  You may resume your normal daily activities until tomorrow AM;  Spend the remainder of the day resting -  avoid any strenuous activity. CALL M.D. ANY SIGN OF   Increasing pain, nausea, vomiting  Abdominal distension (swelling)  New increased bleeding (oral or rectal)  Fever (chills)  Pain in chest area  Bloody discharge from nose or mouth  Shortness of breath      IMPRESSION:    EGD:  -- mild \"gastritis\" or stomach redness and irritation present. Sometimes this is from bacteria, medications (like NSAIDs), or bile, or something else. We biopsied this today. -- also, a small hiatal hernia was seen, which is when the very top of the stomach can slide up an inch or two into the lower chest. This is a common finding in patients with reflux.    -- esophagus was not narrowed or strictured, but I did stretch it open today    COLONOSCOPY:  -- no polyps!!!  -- we did take biopsies in the lining to look for any inflammation/irritation, but hopefully these will be normal.  -- we saw some hemorrhoids, which are very common, and these are swollen veins at the anal canal or end of the rectum, which can bulge with constipation and straining or frequent bowel movements. Sometimes they cause bleeding, burning, pressure, or even pain. A diet high in fiber helps, but using a daily fiber supplement (like 2 teaspoons of psyllium husk powder or metamucil) can help treat these. Follow-up Instructions:   Call Dr. Marita Rodriguez for the questions and/or results of procedure / biopsy (if taken)  in 7-10 days   Telephone # [phone]  Repeat colonoscopy in 5 years    Chapito Ramos MD  Patient Education on Sedation / Analgesia Administered for Procedure      For 24 hours after general anesthesia or intravenous analgesia / sedation:  Have someone responsible help you with your care  Limit your activities  Do not drive and operate hazardous machinery  Do not make important personal, legal or business decisions  Do not drink alcoholic beverages  If you have not urinated within 8 hours after discharge, please contact your physician  Resume your medications unless otherwise instructed    For 24 hours after general anesthesia or intravenous analgesia / sedation  you may experience:  Drowsiness, dizziness, sleepiness, or confusion  Difficulty remembering or delayed reaction times  Difficulty with your balance, especially while walking, move slowly and carefully, do not make sudden position changes  Difficulty focusing or blurred vision    You may not be aware of slight changes in your behavior and/or your reaction time because of the medication used during and after your procedure.     Report the following to your physician:  Excessive pain, swelling, redness or odor of or around the surgical area  Temperature over 100.5  Nausea and vomiting lasting longer than 4 hours or if unable to take medications  Any signs of decreased circulation or nerve impairment to extremity: change in color, persistent numbness, tingling, coldness or increase pain  Any questions or concerns    IF YOU REPORT TO AN EMERGENCY ROOM, DOCTOR'S OFFICE OR HOSPITAL WITHIN 24 HOURS AFTER YOUR PROCEDURE, BRING THIS SHEET AND YOUR AFTER VISIT SUMMARY WITH YOU AND GIVE IT TO THE PHYSICIAN OR NURSE ATTENDING YOU.

## 2023-05-01 NOTE — ANESTHESIA PREPROCEDURE EVALUATION
Anesthetic History   No history of anesthetic complications            Review of Systems / Medical History  Patient summary reviewed, nursing notes reviewed and pertinent labs reviewed    Pulmonary        Sleep apnea: CPAP           Neuro/Psych   Within defined limits           Cardiovascular  Within defined limits                Exercise tolerance: >4 METS     GI/Hepatic/Renal     GERD: well controlled           Endo/Other        Obesity and arthritis     Other Findings   Comments:  Down Syndrom with mild mental retardation            Physical Exam    Airway  Mallampati: III  TM Distance: 4 - 6 cm  Neck ROM: normal range of motion, short neck   Mouth opening: Normal     Cardiovascular    Rhythm: regular  Rate: normal         Dental    Dentition: Lower dentition intact and Upper dentition intact     Pulmonary  Breath sounds clear to auscultation               Abdominal  GI exam deferred       Other Findings            Anesthetic Plan    ASA: 3  Anesthesia type: MAC          Induction: Intravenous  Anesthetic plan and risks discussed with: Patient, Sibling and Healthcare power of

## 2023-05-01 NOTE — PROGRESS NOTES
Endoscopy Case End Note:    3095:  Procedure scope was pre-cleaned, per protocol, at bedside by Alexis YOU      8212:  Report received from anesthesia - Maris Mena CRNA. See anesthesia flowsheet for intra-procedure vital signs and events. 1124:  Glasses returned to patient.

## 2023-05-01 NOTE — PROCEDURES
NAME:  Ellyn Romero   :   1970   MRN:   [id number]     Date/Time:  2023 11:21 AM    Colonoscopy Operative Report    Procedure Type:  Colonoscopy with biopsy     Indications:  family hx of colon polyps, diarrhea  Pre-operative Diagnosis: see indication above  Post-operative Diagnosis:  See findings below  :  Nahomy Dominguez MD  Referring Provider: -None    Exam:  Airway: clear, no airway problems anticipated  Heart: RRR, without gallops or rubs  Lungs: clear bilaterally without wheezes, crackles, or rhonchi  Abdomen: soft, nontender, nondistended, bowel sounds present  Mental Status: awake, alert and oriented to person, place and time    Sedation:  MAC anesthesia Propofol  Procedure Details:  After informed consent was obtained with all risks and benefits of procedure explained and preoperative exam completed, the patient was taken to the endoscopy suite and placed in the left lateral decubitus position. Upon sequential sedation as per above, a digital rectal exam was performed demonstrating internal and external hemorrhoids. The Olympus videocolonoscope  was inserted in the rectum and carefully advanced to the ileocecal valve and appendiceal orifice. The quality of preparation was fair. The colonoscope was slowly withdrawn with careful evaluation between folds. Retroflexion in the rectum was completed demonstrating internal and external hemorrhoids. Findings:   ANUS: Anal exam reveals no masses but hemorrhoids, sphincter tone is normal.   RECTUM: Rectal exam reveals no masses but hemorrhoids. SIGMOID COLON: The mucosa is normal with good vascular pattern and without ulcers, diverticula, and polyps. DESCENDING COLON: The mucosa is normal with good vascular pattern and without ulcers, diverticula, and polyps. TRANSVERSE COLON: The mucosa is normal with good vascular pattern and without ulcers, diverticula, and polyps.    ASCENDING COLON: The mucosa is normal with good vascular pattern and without ulcers, diverticula, and polyps. CECUM: The appendiceal orifice appears normal. The ileocecal valve appears normal.   TERMINAL ILEUM: The terminal ileum was not entered. Specimen Removed:  random colon  Complications:  None. EBL:     None. Impression:  -- sub-optimal bowel preparation, but normal underlying mucosa, biopsied to rule out microscopic colitis  -- internal hemorrhoids  -- family history of colon polyps    Recommendations:   -- await pathology  -- repeat colonoscopy in 5 years    Discharge Disposition:  Home in the company of a  when able to ambulate.       Darrin Owens MD

## 2023-05-01 NOTE — ANESTHESIA POSTPROCEDURE EVALUATION
Procedure(s):  ESOPHAGOGASTRODUODENOSCOPY (EGD)  COLONOSCOPY  ESOPHAGOGASTRODUODENAL (EGD) BIOPSY  ESOPHAGEAL DILATION  COLON BIOPSY. MAC    Anesthesia Post Evaluation        Patient location during evaluation: PACU  Note status: Adequate. Level of consciousness: responsive to verbal stimuli and sleepy but conscious  Pain management: satisfactory to patient  Airway patency: patent  Anesthetic complications: no  Cardiovascular status: acceptable  Respiratory status: acceptable  Hydration status: acceptable  Comments: +Post-Anesthesia Evaluation and Assessment    Patient: Isabell Worrell MRN: [id number]  SSN: [id number]   YOB: 1970  Age: 48 y.o. Sex: male      Cardiovascular Function/Vital Signs    BP (!) 107/41   Pulse 70   Temp 36.8 °C (98.2 °F)   Resp 15   Ht 5' 3\" (1.6 m)   Wt 81.6 kg (180 lb)   SpO2 98%   BMI 31.89 kg/m²     Patient is status post Procedure(s):  ESOPHAGOGASTRODUODENOSCOPY (EGD)  COLONOSCOPY  ESOPHAGOGASTRODUODENAL (EGD) BIOPSY  ESOPHAGEAL DILATION  COLON BIOPSY. Nausea/Vomiting: Controlled. Postoperative hydration reviewed and adequate. Pain:  Pain Scale 1: Numeric (0 - 10) (05/01/23 1141)  Pain Intensity 1: 1 (05/01/23 1141)   Managed. Neurological Status: At baseline. Mental Status and Level of Consciousness: Arousable. Pulmonary Status:   O2 Device: None (Room air) (05/01/23 1141)   Adequate oxygenation and airway patent. Complications related to anesthesia: None    Post-anesthesia assessment completed. No concerns. Signed By: Franc Vasquez MD    5/1/2023  Post anesthesia nausea and vomiting:  controlled      INITIAL Post-op Vital signs:   Vitals Value Taken Time   /78 05/01/23 1151   Temp 36.8 °C (98.2 °F) 05/01/23 1141   Pulse 66 05/01/23 1154   Resp 17 05/01/23 1154   SpO2 100 % 05/01/23 1143   Vitals shown include unvalidated device data.

## 2023-05-01 NOTE — PROGRESS NOTES
The risks and benefits of the bite block have been explained to patient and patient's sister/POA Cloria Number. Patient and patient's sister/POA Cloria Number verbalizes understanding. Patient has a hx of down syndrome. Patient A&Ox1. Patient's sister/POALL Munoz Basset at bedside and signed consent forms.

## 2023-05-01 NOTE — H&P
Gastroenterology Outpatient History and Physical    Patient: Gilford Bowler    Physician: Asher Gilford, MD    Chief Complaint: GERD, family hx of colon polyps  History of Present Illness: 47 yo M with GERD, family hx of colon polyps. Sometimes has diarrhea. Sometimes had dysphagia.     History:  Past Medical History:   Diagnosis Date    Adverse effect of anesthesia     delayed awakening    Arthritis     scoliosis    Down's syndrome     DVT (deep venous thrombosis) ([facility]) 07/17/2014    rt leg, spontaneous    Gastrointestinal disorder     GERD, Robin's Esophagus no longer shows up    Hx of bacterial pneumonia     Ill-defined condition     duodenal polyp, hiatal hernia    Left ventricular hypertrophy     Other ill-defined conditions(799.89)     Down Syndrom with mild mental retardation    Other ill-defined conditions(799.89)     mixed hyperlipidemia/hypercholesteremia    Other ill-defined conditions(799.89)     myopia    Other ill-defined conditions(799.89)     sleep apnea, uses cpap at night    Pancreatitis 2022    Sleep apnea     uses cpap    Spastic esophagus     Thromboembolus ([address]     Varicose veins of both lower extremities       Past Surgical History:   Procedure Laterality Date    COLONOSCOPY N/A 05/31/2018    COLONOSCOPY performed by Carlyn Jamison MD at [facility]COPY    HX CHOLECYSTECTOMY      HX OTHER SURGICAL      convergent strabismus s/p corrective surgery    [place] EGD DELIVER THERMAL ENERGY SPHNCTR/CARDIA GERD        Social History     Socioeconomic History    Marital status: SINGLE   Tobacco Use    Smoking status: Never    Smokeless tobacco: Never   Vaping Use    Vaping Use: Never used   Substance and Sexual Activity    Alcohol use: No     Alcohol/week: 0.0 standard drinks    Drug use: No      Family History   Problem Relation Age of Onset    Hypertension Mother     Diabetes Mother     Delayed Awakening Mother     Asthma Father     Diabetes Father     Asthma Sister     Bleeding Prob Maternal Grandfather OSTEOARTHRITIS Paternal Grandmother     Alcohol abuse Paternal Grandmother     Lung Disease Paternal Grandmother     Hypertension Paternal Grandmother     Alcohol abuse Paternal Grandfather     Heart Disease Paternal Grandfather       Patient Active Problem List   Diagnosis Code    Down's syndrome Q90.9    DVT (deep venous thrombosis) ([facility] I82.409    Gastroesophageal reflux disease without esophagitis K21.9    Mixed hyperlipidemia E78.2    Type 2 diabetes mellitus without complication, without long-term current use of insulin (HCC) E11.9    SERGIO on CPAP G47.33, Z99.89       Allergies: No Known Allergies  Medications:   Prior to Admission medications    Medication Sig Start Date End Date Taking? Authorizing Provider   ML-IC-WR-Fe-Min-Lycopen-Lutein 0.4-162-18 mg tab Take 1 Tablet by mouth daily. Yes Other, MD Coleen   famotidine (PEPCID) 20 mg tablet TAKE 1 TABLET BY MOUTH TWICE DAILY AS NEEDED FOR HEARTBURN 9/10/22   Kalani High MD   FreeStyle Lashawn 2 Sensor kit USE TO SCAN SENSOR AT LEAST 3 TIMES DAILY AND REPLACE SENSOR EVERY 14 DAYS AS DIRECTED 8/20/22   Kalani High MD   clemastine 2.68 mg tab tablet TAKE 1 TABLET TWICE A DAY 8/11/22   Kalani High MD   pantoprazole (PROTONIX) 40 mg tablet TAKE 1 TABLET BY MOUTH DAILY 8/7/22   Kalani High MD   metroNIDAZOLE (METROGEL) 1 % topical gel APPLY THIN LAYER TOPICALLY TO THE AFFECTED AREA EVERY DAY AFTER WASHING 8/2/22   Kalani High MD   FreeStyle Lashawn 2 Sensor kit USE AS DIRECTED, CHANGE SENSOR EVERY 14 DAYS 7/22/22   Kalani High MD   metFORMIN (GLUCOPHAGE) 500 mg tablet TAKE 1 TABLET DAILY 6/19/22   Kalani High MD   magnesium oxide (MAG-OX) 400 mg tablet Take 400 mg by mouth daily. Provider, Historical   carboxymethylcellulose sodium (TheraTears) 0.25 % drop ophthalmic solution Administer 1 Drop to both eyes.     Provider, Historical   acetaminophen (TylenoL) 325 mg tablet Take  by mouth every four (4) hours as needed for Pain. Provider, Historical   atorvastatin (LIPITOR) 40 mg tablet Take 1 Tablet by mouth nightly. 5/1/22   Josette Knight MD   FreeStyle Lashawn 14 Day [place] misc USE TO SCAN SENSOR AT LEAST THREE TIMES DAILY AS DIRECTED 3/23/22   Josette Knight MD   flash glucose sensor (FreeStyle Lashawn 2 Sensor) kit USE TO SCAN SENSOR AT LEAST THREE TIMES DAILY, APPLY AND REPLACE SENSOR EVERY 14 DAYS 1/26/22   Josette Knight MD   azelastine (Astepro) 205.5 mcg (0.15 %) 2 Sprays by Both Nostrils route as needed (Rhrinitis). 1/26/22   Josette Knight MD   pantoprazole (PROTONIX) 40 mg tablet Take 1 Tablet by mouth daily. 12/13/21   Josette Knight MD   clotrimazole (LOTRIMIN) 1 % topical cream Apply  to affected area two (2) times a day. 6/8/21   Josette Knight MD   betamethasone dipropionate (DIPROSONE) 0.05 % topical cream Apply  to affected area two (2) times a day. 6/8/21   Josette Knight MD   varicella-zoster recombinant, PF, (Shingrix, PF,) 50 mcg/0.5 mL susr injection 0.5mL by IntraMUSCular route once now and then repeat in 2-6 months  Patient not taking: Reported on 5/24/2022 5/25/21   Josette Knight MD   glucose blood VI test strips (FreeStyle Precision Aleksandr Strips) strip Check once daily 10/1/20   Josette Knight MD   fexofenadine (ALLEGRA) 180 mg tablet Take 180 mg by mouth daily. Provider, Historical   cholecalciferol, vitamin D3, (Vitamin D3) 50 mcg (2,000 unit) tab Take  by mouth daily. Provider, Historical   PSEUDOEPHEDRINE-guaiFENesin (Mucinex D)  mg per tablet Take 1 Tab by mouth every twelve (12) hours. Provider, Historical   ondansetron hcl (ZOFRAN, AS HYDROCHLORIDE,) 4 mg tablet Take 1 Tab by mouth every eight (8) hours as needed for Nausea. Patient not taking: Reported on 5/24/2022 4/7/17   Nelson Hooks MD   vit B cmplx & C #11-ca-dha-Q10 (BRAIN MIGHT-DHA-CO Q10) 140-10-10 mg Tab Take 1 Tab by mouth daily.     Other, MD Coleen   calcium 600 mg cap Take 1 Tab by mouth daily. Other, MD Coleen     Physical Exam:   Vital Signs: There were no vitals taken for this visit.   General: well developed, well nourished   HEENT: unremarkable   Heart: regular rhythm no mumur    Lungs: clear   Abdominal:  benign   Neurological: unremarkable   Extremities: no edema     Findings/Diagnosis: GERD, family hx of colon polyps  Plan of Care/Planned Procedure: EGD/Colonoscopy with conscious/deep sedation    Signed:  Garett Ambrosio MD 5/1/2023

## 2023-05-01 NOTE — PERIOP NOTES
[facility]  1970  [id number]    Situation:  Verbal report received from: Ray Guevara RN  Procedure: Procedure(s):  ESOPHAGOGASTRODUODENOSCOPY (EGD)  COLONOSCOPY  ESOPHAGOGASTRODUODENAL (EGD) BIOPSY  ESOPHAGEAL DILATION  COLON BIOPSY    Background:    Preoperative diagnosis: DIARRHEA, ACID REFLUX, DYSPHAGIA, FLATULENCE SYMPTOM  Postoperative diagnosis: EGD- gastritis, hiatal hernia, and hx of dysphagia  COLON- Hemorrhoids    :  Dr. Roberth Wharton    Assistant(s): Endoscopy Technician-1: Deepika Palmer  Endoscopy RN-1: Collins Marks RN    Specimens:   ID Type Source Tests Collected by Time Destination   1 : Duodenum BX Preservative Duodenum  Stephanie Florence MD 5/1/2023 1108 Pathology   2 : Gastric BX Preservative Gastric  Stephanie Florence MD 5/1/2023 1109 Pathology   3 : [address] Preservative GE Junction  Stephanie Florence MD 5/1/2023 1109 Pathology   4 : Random Colon BX Preservative Random colon  Stephanie Florence MD 5/1/2023 1117 Pathology     H. Pylori  no    Assessment:  Intra-procedure medications     Anesthesia gave intra-procedure sedation and medications, see anesthesia flow sheet yes    Intravenous fluids: NS@ KVO     Vital signs stable     Abdominal assessment: round and soft     Recommendation:  Discharge patient per MD order. Family   Permission to share finding with family or friend yes    Sister at bedside for Post- procedure instructions.

## 2023-12-13 LAB — HBA1C MFR BLD HPLC: 5.3 %

## 2023-12-15 ENCOUNTER — HOSPITAL ENCOUNTER (EMERGENCY)
Facility: HOSPITAL | Age: 53
Discharge: HOME | End: 2023-12-15
Attending: EMERGENCY MEDICINE
Payer: MEDICARE

## 2023-12-15 ENCOUNTER — APPOINTMENT (OUTPATIENT)
Facility: HOSPITAL | Age: 53
End: 2023-12-15
Payer: MEDICARE

## 2023-12-15 VITALS
WEIGHT: 170.64 LBS | DIASTOLIC BLOOD PRESSURE: 65 MMHG | SYSTOLIC BLOOD PRESSURE: 110 MMHG | RESPIRATION RATE: 16 BRPM | TEMPERATURE: 97.7 F | OXYGEN SATURATION: 100 % | HEART RATE: 65 BPM | BODY MASS INDEX: 30.23 KG/M2

## 2023-12-15 DIAGNOSIS — R10.84 GENERALIZED ABDOMINAL PAIN: Primary | ICD-10-CM

## 2023-12-15 LAB
ALBUMIN SERPL-MCNC: 3.2 G/DL (ref 3.5–5)
ALBUMIN/GLOB SERPL: 0.8 (ref 1.1–2.2)
ALP SERPL-CCNC: 68 U/L (ref 45–117)
ALT SERPL-CCNC: 27 U/L (ref 12–78)
ANION GAP SERPL CALC-SCNC: 4 MMOL/L (ref 5–15)
APPEARANCE UR: CLEAR
AST SERPL-CCNC: 14 U/L (ref 15–37)
BACTERIA URNS QL MICRO: NEGATIVE /HPF
BASOPHILS # BLD: 0.1 K/UL (ref 0–0.1)
BASOPHILS NFR BLD: 1 % (ref 0–1)
BILIRUB SERPL-MCNC: 0.7 MG/DL (ref 0.2–1)
BILIRUB UR QL: NEGATIVE
BUN SERPL-MCNC: 15 MG/DL (ref 6–20)
BUN/CREAT SERPL: 15 (ref 12–20)
CALCIUM SERPL-MCNC: 9 MG/DL (ref 8.5–10.1)
CHLORIDE SERPL-SCNC: 106 MMOL/L (ref 97–108)
CO2 SERPL-SCNC: 30 MMOL/L (ref 21–32)
COLOR UR: NORMAL
CREAT SERPL-MCNC: 1.01 MG/DL (ref 0.7–1.3)
DIFFERENTIAL METHOD BLD: ABNORMAL
EOSINOPHIL # BLD: 0.1 K/UL (ref 0–0.4)
EOSINOPHIL NFR BLD: 1 % (ref 0–7)
EPITH CASTS URNS QL MICRO: NORMAL /LPF
ERYTHROCYTE [DISTWIDTH] IN BLOOD BY AUTOMATED COUNT: 14.6 % (ref 11.5–14.5)
GLOBULIN SER CALC-MCNC: 3.8 G/DL (ref 2–4)
GLUCOSE SERPL-MCNC: 101 MG/DL (ref 65–100)
GLUCOSE UR STRIP.AUTO-MCNC: NEGATIVE MG/DL
HCT VFR BLD AUTO: 40.3 % (ref 36.6–50.3)
HGB BLD-MCNC: 13.4 G/DL (ref 12.1–17)
HGB UR QL STRIP: NEGATIVE
HYALINE CASTS URNS QL MICRO: NORMAL /LPF (ref 0–2)
IMM GRANULOCYTES # BLD AUTO: 0 K/UL (ref 0–0.04)
IMM GRANULOCYTES NFR BLD AUTO: 0 % (ref 0–0.5)
KETONES UR QL STRIP.AUTO: NEGATIVE MG/DL
LEUKOCYTE ESTERASE UR QL STRIP.AUTO: NEGATIVE
LIPASE SERPL-CCNC: 27 U/L (ref 13–75)
LYMPHOCYTES # BLD: 1.1 K/UL (ref 0.8–3.5)
LYMPHOCYTES NFR BLD: 20 % (ref 12–49)
MCH RBC QN AUTO: 29.1 PG (ref 26–34)
MCHC RBC AUTO-ENTMCNC: 33.3 G/DL (ref 30–36.5)
MCV RBC AUTO: 87.6 FL (ref 80–99)
MONOCYTES # BLD: 0.4 K/UL (ref 0–1)
MONOCYTES NFR BLD: 8 % (ref 5–13)
NEUTS SEG # BLD: 3.7 K/UL (ref 1.8–8)
NEUTS SEG NFR BLD: 70 % (ref 32–75)
NITRITE UR QL STRIP.AUTO: NEGATIVE
NRBC # BLD: 0 K/UL (ref 0–0.01)
NRBC BLD-RTO: 0 PER 100 WBC
PH UR STRIP: 7.5 (ref 5–8)
PLATELET # BLD AUTO: 247 K/UL (ref 150–400)
PMV BLD AUTO: 9.5 FL (ref 8.9–12.9)
POTASSIUM SERPL-SCNC: 4.1 MMOL/L (ref 3.5–5.1)
PROT SERPL-MCNC: 7 G/DL (ref 6.4–8.2)
PROT UR STRIP-MCNC: NEGATIVE MG/DL
RBC # BLD AUTO: 4.6 M/UL (ref 4.1–5.7)
RBC #/AREA URNS HPF: NORMAL /HPF (ref 0–5)
SODIUM SERPL-SCNC: 140 MMOL/L (ref 136–145)
SP GR UR REFRACTOMETRY: 1.02
URINE CULTURE IF INDICATED: NORMAL
UROBILINOGEN UR QL STRIP.AUTO: 0.2 EU/DL (ref 0.2–1)
WBC # BLD AUTO: 5.4 K/UL (ref 4.1–11.1)
WBC URNS QL MICRO: NORMAL /HPF (ref 0–4)

## 2023-12-15 PROCEDURE — 6360000004 HC RX CONTRAST MEDICATION: Performed by: STUDENT IN AN ORGANIZED HEALTH CARE EDUCATION/TRAINING PROGRAM

## 2023-12-15 PROCEDURE — 80053 COMPREHEN METABOLIC PANEL: CPT

## 2023-12-15 PROCEDURE — 85025 COMPLETE CBC W/AUTO DIFF WBC: CPT

## 2023-12-15 PROCEDURE — 83690 ASSAY OF LIPASE: CPT

## 2023-12-15 PROCEDURE — 99285 EMERGENCY DEPT VISIT HI MDM: CPT

## 2023-12-15 PROCEDURE — 81001 URINALYSIS AUTO W/SCOPE: CPT

## 2023-12-15 PROCEDURE — 36415 COLL VENOUS BLD VENIPUNCTURE: CPT

## 2023-12-15 PROCEDURE — 74177 CT ABD & PELVIS W/CONTRAST: CPT

## 2023-12-15 RX ADMIN — IOPAMIDOL 100 ML: 755 INJECTION, SOLUTION INTRAVENOUS at 12:36

## 2023-12-15 NOTE — ED NOTES
I have reviewed the provider's instructions with the patient, answering all questions to his satisfaction. Pt ambulatory out of ED.        Frida Ravi [place]  23/53/50 1981

## 2023-12-15 NOTE — ED PROVIDER NOTES
[facility] EMERGENCY DEPT  EMERGENCY DEPARTMENT ENCOUNTER       Pt Name: Marquis Anguiano  MRN: [id number]  [address] [facility] 1970  Date of evaluation: 12/15/2023  Provider: Patrick London MD   PCP: None, None  Note Started: 5:55 PM 12/15/23     CHIEF COMPLAINT       Chief Complaint   Patient presents with    Abdominal Pain     Half hour ago complaining of pain lower abdomen both sides and rubbing his back as well with no vomiting. Mother reports pt has recent increase cognitive decline(h/o Down's syndrome)-where he has had moments of blank stares, and had a recent increase in Lamotrigine but is on decline in the dose(all of this through [facility])-pt has had constipation problems in past.      HISTORY OF PRESENT ILLNESS: 1 or more elements      History From: Patient, Mother, History limited by: Cognitive impairement     Marquis Anguiano is a 48 y.o. male who presents with abdominal pain. Patient had an episode of bilateral upper abdominal pain this AM that lasted about 30 minutes, became tearful. Since that time has been well without any further complaints of abdominal pain. Nursing Notes were all reviewed and agreed with or any disagreements were addressed in the HPI. REVIEW OF SYSTEMS      Positives and Pertinent negatives as per HPI.     PAST HISTORY     Past Medical History:  Past Medical History:   Diagnosis Date    Adverse effect of anesthesia     delayed awakening    Arthritis     scoliosis    Down's syndrome     DVT (deep venous thrombosis) ([address]) 07/17/2014    rt leg, spontaneous    Gastrointestinal disorder     GERD, Mcqueen's Esophagus no longer shows up    Hx of bacterial pneumonia     Ill-defined condition     duodenal polyp, hiatal hernia    Left ventricular hypertrophy     Other ill-defined conditions(799.89)     myopia    Other ill-defined conditions(799.89)     Down Syndrom with mild mental retardation    Other ill-defined conditions(799.89)     sleep apnea, uses cpap at night    Other ill-defined

## 2024-06-11 ENCOUNTER — HOSPITAL ENCOUNTER (OUTPATIENT)
Facility: HOSPITAL | Age: 54
Discharge: HOME | End: 2024-06-14
Payer: MEDICAID

## 2024-06-11 DIAGNOSIS — R05.9 COUGH, UNSPECIFIED TYPE: ICD-10-CM

## 2024-06-11 DIAGNOSIS — K21.9 GASTROESOPHAGEAL REFLUX DISEASE WITHOUT ESOPHAGITIS: ICD-10-CM

## 2024-06-11 DIAGNOSIS — R13.10 DYSPHAGIA, UNSPECIFIED TYPE: ICD-10-CM

## 2024-06-11 PROCEDURE — 92611 MOTION FLUOROSCOPY/SWALLOW: CPT

## 2024-06-11 PROCEDURE — 74230 X-RAY XM SWLNG FUNCJ C+: CPT

## 2024-06-11 NOTE — PLAN OF CARE
--Upright all PO intake   --Oral hygiene 2-3x/day  --Eliminate distractions with meals     Reflux precautions:  - Sit fully upright at 90 for meals (prefer that meals are eating while sitting in a chair)  - Remain up for 1-hour after meals  - Consider 6-smaller meals throughout the day (eat about every 2-3 hours) instead of large meals  - Avoid acidic foods (tomato based, citrus fruits, alcohol, high-fat dairy, caffeine, fried foods)  - Trial warm, de-caffeinated beverages with meals to improve esophageal clearance  - Sleep w/ HOB elevated (30 degrees)  - Stop eating/drinking anything except water 2-hours before bedtime  - Avoid strenuous activity after meals (walking is OK)       COMMUNICATION/EDUCATION:   The above findings and recommendations were discussed with:  radiologist and caregiver  who verbalized understanding.    Thank you for this referral.  Missy Alvarez SLP  Minutes: 35

## 2025-01-03 ENCOUNTER — HOSPITAL ENCOUNTER (EMERGENCY)
Facility: HOSPITAL | Age: 55
Discharge: HOME | End: 2025-01-03
Attending: EMERGENCY MEDICINE
Payer: MEDICARE

## 2025-01-03 ENCOUNTER — APPOINTMENT (OUTPATIENT)
Facility: HOSPITAL | Age: 55
End: 2025-01-03
Payer: MEDICARE

## 2025-01-03 VITALS
WEIGHT: 174.6 LBS | HEART RATE: 67 BPM | TEMPERATURE: 99.3 F | OXYGEN SATURATION: 100 % | RESPIRATION RATE: 19 BRPM | BODY MASS INDEX: 30.93 KG/M2 | DIASTOLIC BLOOD PRESSURE: 52 MMHG | SYSTOLIC BLOOD PRESSURE: 112 MMHG

## 2025-01-03 DIAGNOSIS — N30.00 ACUTE CYSTITIS WITHOUT HEMATURIA: Primary | ICD-10-CM

## 2025-01-03 DIAGNOSIS — R10.84 GENERALIZED ABDOMINAL PAIN: ICD-10-CM

## 2025-01-03 LAB
ALBUMIN SERPL-MCNC: 3.2 G/DL (ref 3.5–5)
ALBUMIN/GLOB SERPL: 0.8 (ref 1.1–2.2)
ALP SERPL-CCNC: 81 U/L (ref 45–117)
ALT SERPL-CCNC: 29 U/L (ref 12–78)
ANION GAP SERPL CALC-SCNC: 4 MMOL/L (ref 2–12)
APPEARANCE UR: ABNORMAL
AST SERPL-CCNC: 20 U/L (ref 15–37)
BACTERIA URNS QL MICRO: ABNORMAL /HPF
BASOPHILS # BLD: 0.1 K/UL (ref 0–0.1)
BASOPHILS NFR BLD: 0 % (ref 0–1)
BILIRUB SERPL-MCNC: 1 MG/DL (ref 0.2–1)
BILIRUB UR QL: NEGATIVE
BUN SERPL-MCNC: 11 MG/DL (ref 6–20)
BUN/CREAT SERPL: 12 (ref 12–20)
CALCIUM SERPL-MCNC: 8.8 MG/DL (ref 8.5–10.1)
CHLORIDE SERPL-SCNC: 103 MMOL/L (ref 97–108)
CO2 SERPL-SCNC: 29 MMOL/L (ref 21–32)
COLOR UR: ABNORMAL
CREAT SERPL-MCNC: 0.89 MG/DL (ref 0.7–1.3)
DIFFERENTIAL METHOD BLD: ABNORMAL
EOSINOPHIL # BLD: 0 K/UL (ref 0–0.4)
EOSINOPHIL NFR BLD: 0 % (ref 0–7)
EPITH CASTS URNS QL MICRO: ABNORMAL /LPF
ERYTHROCYTE [DISTWIDTH] IN BLOOD BY AUTOMATED COUNT: 14.1 % (ref 11.5–14.5)
GLOBULIN SER CALC-MCNC: 3.9 G/DL (ref 2–4)
GLUCOSE SERPL-MCNC: 109 MG/DL (ref 65–100)
GLUCOSE UR STRIP.AUTO-MCNC: NEGATIVE MG/DL
HCT VFR BLD AUTO: 40.8 % (ref 36.6–50.3)
HGB BLD-MCNC: 13.8 G/DL (ref 12.1–17)
HGB UR QL STRIP: ABNORMAL
IMM GRANULOCYTES # BLD AUTO: 0.1 K/UL (ref 0–0.04)
IMM GRANULOCYTES NFR BLD AUTO: 1 % (ref 0–0.5)
KETONES UR QL STRIP.AUTO: NEGATIVE MG/DL
LEUKOCYTE ESTERASE UR QL STRIP.AUTO: ABNORMAL
LIPASE SERPL-CCNC: 24 U/L (ref 13–75)
LYMPHOCYTES # BLD: 1.6 K/UL (ref 0.8–3.5)
LYMPHOCYTES NFR BLD: 9 % (ref 12–49)
MCH RBC QN AUTO: 29.4 PG (ref 26–34)
MCHC RBC AUTO-ENTMCNC: 33.8 G/DL (ref 30–36.5)
MCV RBC AUTO: 87 FL (ref 80–99)
MONOCYTES # BLD: 1.1 K/UL (ref 0–1)
MONOCYTES NFR BLD: 6 % (ref 5–13)
NEUTS SEG # BLD: 14.3 K/UL (ref 1.8–8)
NEUTS SEG NFR BLD: 84 % (ref 32–75)
NITRITE UR QL STRIP.AUTO: POSITIVE
NRBC # BLD: 0 K/UL (ref 0–0.01)
NRBC BLD-RTO: 0 PER 100 WBC
PH UR STRIP: 6.5 (ref 5–8)
PLATELET # BLD AUTO: 248 K/UL (ref 150–400)
PMV BLD AUTO: 9.5 FL (ref 8.9–12.9)
POTASSIUM SERPL-SCNC: 4.1 MMOL/L (ref 3.5–5.1)
PROT SERPL-MCNC: 7.1 G/DL (ref 6.4–8.2)
PROT UR STRIP-MCNC: ABNORMAL MG/DL
RBC # BLD AUTO: 4.69 M/UL (ref 4.1–5.7)
RBC #/AREA URNS HPF: ABNORMAL /HPF (ref 0–5)
SODIUM SERPL-SCNC: 136 MMOL/L (ref 136–145)
SP GR UR REFRACTOMETRY: 1.01
URINE CULTURE IF INDICATED: ABNORMAL
UROBILINOGEN UR QL STRIP.AUTO: 0.2 EU/DL (ref 0.2–1)
WBC # BLD AUTO: 17.1 K/UL (ref 4.1–11.1)
WBC URNS QL MICRO: >100 /HPF (ref 0–4)

## 2025-01-03 PROCEDURE — 87186 SC STD MICRODIL/AGAR DIL: CPT

## 2025-01-03 PROCEDURE — 87086 URINE CULTURE/COLONY COUNT: CPT

## 2025-01-03 PROCEDURE — 83690 ASSAY OF LIPASE: CPT

## 2025-01-03 PROCEDURE — 81001 URINALYSIS AUTO W/SCOPE: CPT

## 2025-01-03 PROCEDURE — 6360000002 HC RX W HCPCS: Performed by: EMERGENCY MEDICINE

## 2025-01-03 PROCEDURE — 85025 COMPLETE CBC W/AUTO DIFF WBC: CPT

## 2025-01-03 PROCEDURE — 6370000000 HC RX 637 (ALT 250 FOR IP): Performed by: EMERGENCY MEDICINE

## 2025-01-03 PROCEDURE — 36415 COLL VENOUS BLD VENIPUNCTURE: CPT

## 2025-01-03 PROCEDURE — 80053 COMPREHEN METABOLIC PANEL: CPT

## 2025-01-03 PROCEDURE — 99285 EMERGENCY DEPT VISIT HI MDM: CPT

## 2025-01-03 PROCEDURE — 2580000003 HC RX 258: Performed by: EMERGENCY MEDICINE

## 2025-01-03 PROCEDURE — 74177 CT ABD & PELVIS W/CONTRAST: CPT

## 2025-01-03 PROCEDURE — 96375 TX/PRO/DX INJ NEW DRUG ADDON: CPT

## 2025-01-03 PROCEDURE — 87088 URINE BACTERIA CULTURE: CPT

## 2025-01-03 PROCEDURE — 6360000004 HC RX CONTRAST MEDICATION: Performed by: RADIOLOGY

## 2025-01-03 PROCEDURE — 2500000003 HC RX 250 WO HCPCS: Performed by: EMERGENCY MEDICINE

## 2025-01-03 PROCEDURE — 96374 THER/PROPH/DIAG INJ IV PUSH: CPT

## 2025-01-03 RX ORDER — ONDANSETRON 4 MG/1
4 TABLET, FILM COATED ORAL EVERY 8 HOURS PRN
Qty: 12 TABLET | Refills: 0 | Status: SHIPPED | OUTPATIENT
Start: 2025-01-03

## 2025-01-03 RX ORDER — 0.9 % SODIUM CHLORIDE 0.9 %
1000 INTRAVENOUS SOLUTION INTRAVENOUS ONCE
Status: COMPLETED | OUTPATIENT
Start: 2025-01-03 | End: 2025-01-03

## 2025-01-03 RX ORDER — ONDANSETRON 2 MG/ML
4 INJECTION INTRAMUSCULAR; INTRAVENOUS
Status: COMPLETED | OUTPATIENT
Start: 2025-01-03 | End: 2025-01-03

## 2025-01-03 RX ORDER — DICYCLOMINE HCL 20 MG
20 TABLET ORAL
Status: COMPLETED | OUTPATIENT
Start: 2025-01-03 | End: 2025-01-03

## 2025-01-03 RX ORDER — CEPHALEXIN 500 MG/1
500 CAPSULE ORAL 3 TIMES DAILY
Qty: 30 CAPSULE | Refills: 0 | Status: SHIPPED | OUTPATIENT
Start: 2025-01-03 | End: 2025-01-13

## 2025-01-03 RX ORDER — IOPAMIDOL 755 MG/ML
100 INJECTION, SOLUTION INTRAVASCULAR
Status: COMPLETED | OUTPATIENT
Start: 2025-01-03 | End: 2025-01-03

## 2025-01-03 RX ADMIN — ONDANSETRON 4 MG: 2 INJECTION INTRAMUSCULAR; INTRAVENOUS at 05:49

## 2025-01-03 RX ADMIN — IOPAMIDOL 100 ML: 755 INJECTION, SOLUTION INTRAVENOUS at 06:36

## 2025-01-03 RX ADMIN — DICYCLOMINE HYDROCHLORIDE 20 MG: 20 TABLET ORAL at 05:53

## 2025-01-03 RX ADMIN — WATER 1000 MG: 1 INJECTION INTRAMUSCULAR; INTRAVENOUS; SUBCUTANEOUS at 05:50

## 2025-01-03 RX ADMIN — SODIUM CHLORIDE 1000 ML: 9 INJECTION, SOLUTION INTRAVENOUS at 05:47

## 2025-01-03 ASSESSMENT — ENCOUNTER SYMPTOMS
DIARRHEA: 0
VOMITING: 0
SHORTNESS OF BREATH: 0
NAUSEA: 0
ABDOMINAL PAIN: 1

## 2025-01-03 NOTE — ED PROVIDER NOTES
antibiotics    DIAGNOSTIC STUDY RESULTS     Vital Signs:   Reviewed the patient's vital signs.  No data found.      Pulse Oximetry Analysis:  99% on RA normal    Heart Monitory Analysis:  Rate: 80 bpm  Rhythm: nsr    Labs:     Recent Results (from the past 48 hour(s))   Urinalysis with Reflex to Culture    Collection Time: 01/03/25  3:50 AM    Specimen: Urine   Result Value Ref Range    Color, UA YELLOW/STRAW      Appearance CLOUDY (A) CLEAR      Specific Gravity, UA 1.012      pH, Urine 6.5 5.0 - 8.0      Protein, UA TRACE (A) NEG mg/dL    Glucose, Ur Negative NEG mg/dL    Ketones, Urine Negative NEG mg/dL    Bilirubin, Urine Negative NEG      Blood, Urine MODERATE (A) NEG      Urobilinogen, Urine 0.2 0.2 - 1.0 EU/dL    Nitrite, Urine Positive (A) NEG      Leukocyte Esterase, Urine LARGE (A) NEG      WBC, UA >100 (H) 0 - 4 /hpf    RBC, UA 10-20 0 - 5 /hpf    Epithelial Cells, UA MODERATE (A) FEW /lpf    BACTERIA, URINE 3+ (A) NEG /hpf    Urine Culture if Indicated URINE CULTURE ORDERED (A) CNI     Culture, Urine    Collection Time: 01/03/25  3:50 AM    Specimen: Urine   Result Value Ref Range    Special Requests NO SPECIAL REQUESTS  Reflexed from [id number]        [place] count >100,000  COLONIES/mL        Culture (A)       Gram negative rods IDENTIFICATION AND SUSCEPTIBILITY TO FOLLOW   CBC with Auto Differential    Collection Time: 01/03/25  5:41 AM   Result Value Ref Range    WBC 17.1 (H) 4.1 - 11.1 K/uL    RBC 4.69 4.10 - 5.70 M/uL    Hemoglobin 13.8 12.1 - 17.0 g/dL    Hematocrit 40.8 36.6 - 50.3 %    MCV 87.0 80.0 - 99.0 FL    MCH 29.4 26.0 - 34.0 PG    MCHC 33.8 30.0 - 36.5 g/dL    RDW 14.1 11.5 - 14.5 %    Platelets 248 150 - 400 K/uL    MPV 9.5 8.9 - 12.9 FL    Nucleated RBCs 0.0 0  WBC    nRBC 0.00 0.00 - 0.01 K/uL    Neutrophils % 84 (H) 32 - 75 %    Lymphocytes % 9 (L) 12 - 49 %    Monocytes % 6 5 - 13 %    Eosinophils % 0 0 - 7 %    Basophils % 0 0 - 1 %    Immature Granulocytes % 1 (H) 0.0 - 0.5  drug: zoster recombinant adjuvanted vaccine     Theratears PF 0.25 % Soln  Generic drug: Carboxymethylcellulose Sod PF               Where to Get Your Medications        These medications were sent to [id number] - [place], [place] - [address] - P [phone] - F [phone] [address] [facility] [address]      Phone: [phone]   cephALEXin 500 MG capsule  ondansetron 4 MG tablet           DISCONTINUED MEDICATIONS:  Discharge Medication List as of 1/3/2025  7:50 AM          I am the Primary Clinician of Record.   Christine Estrada MD (electronically signed)    (Please note that parts of this dictation were completed with voice recognition software. Quite often unanticipated grammatical, syntax, homophones, and other interpretive errors are inadvertently transcribed by the computer software. Please disregards these errors. Please excuse any errors that have escaped final proofreading.)            Christine Estrada MD  01/04/25 0819

## 2025-01-03 NOTE — ED NOTES
Bedside shift change report given to ELSI Louis (oncoming nurse) by ELSI Rubin (offgoing nurse). Report included the following information Nurse Handoff Report, ED Encounter Summary, ED SBAR, and Adult Overview.

## 2025-01-03 NOTE — DISCHARGE INSTRUCTIONS
It was a pleasure taking care of you in our Emergency Department today.  We know that when you come to [facility], you are entrusting us with your health, comfort, and safety.  Our physicians and nurses honor that trust, and truly appreciate the opportunity to care for you and your loved ones.      We also value your feedback.  If you receive a survey about your Emergency Department experience today, please fill it out.  We care about our patients' feedback, and we listen to what you have to say.  Thank you!      Dr. Christine Estrada MD.

## 2025-01-03 NOTE — ED NOTES
Assumed care of patient at this time, report received from ELSI Rubin. Patient resting in stretcher, lying supine. Caregiver at bedside. Bed locked and in low position, side rails x2, call bell within reach.

## 2025-01-05 LAB
BACTERIA SPEC CULT: ABNORMAL
CC UR VC: ABNORMAL
SERVICE CMNT-IMP: ABNORMAL

## 2025-06-06 ENCOUNTER — TRANSCRIBE ORDERS (OUTPATIENT)
Facility: HOSPITAL | Age: 55
End: 2025-06-06

## 2025-06-06 DIAGNOSIS — H47.10 CHOKED DISC: Primary | ICD-10-CM

## 2025-06-10 ENCOUNTER — TRANSCRIBE ORDERS (OUTPATIENT)
Facility: HOSPITAL | Age: 55
End: 2025-06-10

## 2025-06-10 DIAGNOSIS — H47.10 CHOKED DISC: Primary | ICD-10-CM

## 2025-06-11 ENCOUNTER — HOSPITAL ENCOUNTER (OUTPATIENT)
Facility: HOSPITAL | Age: 55
Discharge: HOME | End: 2025-06-14
Attending: STUDENT IN AN ORGANIZED HEALTH CARE EDUCATION/TRAINING PROGRAM
Payer: MEDICARE

## 2025-06-11 DIAGNOSIS — H47.10 CHOKED DISC: ICD-10-CM

## 2025-06-11 PROCEDURE — 70551 MRI BRAIN STEM W/O DYE: CPT

## 2025-06-25 NOTE — PROGRESS NOTES
HISTORY OF PRESENT ILLNESS  Nithin Adams is a 46 y.o. male. HPI     Last here vv was 8/20/21. Here for routine care.   Presents with mom who provide hx    His mom notes that he either has wax in ears or is having difficulty breathing  On exam: not much cerumen  Discussed f/u with ENT if having difficulty breathing    He c/o same throat issue as in the past  Discussed can f/u with ENT about this     BP today is controlled     Wt was 175 lbs lov     BS , 88 128 111 120 109 127 104 150 11 106 126 136 112 152 114   He takes metformin for DM 500mg daily  He has free style julius     Reviewed labs   Will do POC A1c today  Ordered labs       The patient has a h/o hlp   On lipitor 40mg     On pepcid BID for gerd   Uses protonix q AM  Takes this twice daily no issues     Not taking protonix every day     Takes mvi  Take vit b12  Takes allegra for allergies      Takes calcium  Takes vitamin d 2000units      Has constantino--he wears cpap 11/21  He has new machine and mask and is doing a better job with this  He sees Dr. Lulu Chapin  Last there 10/29/20   Will schedule f/u missed his last appointment    He is following with Dr. Mark Ramey (podiatry)  Last visit 11/21      Sees dr. Tasha Mims) for chronic cough from gerd/reflux   Last there 5/31/19  Has been using gaviscon which has helped  He is on clemastine BID for a cough   Last ct chest 10/20: stable changes   Provided referral to new pulm last office visit he will schedule     Saw Dr. 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 dEwin Loving is retired     Has bunions, not operated yet  Sees gretchen, trying to treat foot fungus, got new shoes      Has a h/o rash on his nose was told it was a fungus by the ENT looks more like rosacea has some reddish hue on PT called in needing to cancel her OB appointment for today. She is wondering if she has to reschedule or if she is ok with continuing with her next appointment that's already scheduled. Please follow up.    cheeks and nose  Gave metrogel for this     He c/o pain in throat in chest x as long as he can remember was sent to cardiology for this and had evaluation  He saw Dr. Jimmy Morales (cardio) for CP  Last there 9/30/20      Takes asa at night--has h/o isolated dvt in 2008 no recurrent issues   Was on coumadin until 2018      PREVENTIVE:  Colonoscopy: 5/18 sara 10 yrs, has had egd as well   PSA: 6/20 0.3 5/21 0.4  AAA screen not needed  Tdap: 10/19  Pneumovax: 2/21  Prev 13 not yet needed  Foot exam: 11/23/21  Shingrix: discussed, ordered prescription   Flu shot: Fall 2021  Ophtho: 7/20 amanda tower, scheduled 12/21  A1c: 12/19 8.2 6/20 5.9 11/20 5.9 2/21 6.0 5/21 5.9 9/21 5.9, 11/21 5.9  Lipids 2/21  ldl 65  Micro: 9/21  Hep C screen: 5/21 negative  Covid: both + booster (moderna)    Patient Active Problem List    Diagnosis Date Noted    Type 2 diabetes mellitus without complication, without long-term current use of insulin ([facility] 04/06/2020    SERGIO on CPAP 04/06/2020    Gastroesophageal reflux disease without esophagitis 04/04/2020    Mixed hyperlipidemia 04/04/2020    DVT (deep venous thrombosis) ([facility] 07/17/2014    Down's syndrome      Current Outpatient Medications   Medication Sig Dispense Refill    FreeStyle Lashawn 2 Sensor kit USE TO SCAN SENSOR AT LEAST THREE TIMES DAILY. APPLY AND REPLACE SENSOR EVERY 14 DAYS 1 Kit 2    metFORMIN (GLUCOPHAGE) 500 mg tablet TAKE 1 TABLET DAILY 100 Tablet 0    atorvastatin (LIPITOR) 40 mg tablet TAKE 1 TABLET NIGHTLY 90 Tablet 1    famotidine (PEPCID) 20 mg tablet TAKE 1 TABLET 2 TIMES DAILY AS NEEDED FOR HEARTBURN, GASTROESOPHAGEAL REFLUX DISEASE (GERD), OR INDIGESTION 180 Tablet 1    metroNIDAZOLE (METROGEL) 1 % topical gel Apply  to affected area daily. Use a thin layer to affected areas after washing 45 g 0    clemastine 2.68 mg tab tablet TAKE 1 TABLET TWICE A  Tablet 1    pantoprazole (PROTONIX) 40 mg tablet Take 1 Tablet by mouth daily.  30 Tablet 0    pantoprazole (PROTONIX) 40 mg tablet Take 1 Tablet by mouth daily. 90 Tablet 1    clotrimazole (LOTRIMIN) 1 % topical cream Apply  to affected area two (2) times a day. 15 g 0    betamethasone dipropionate (DIPROSONE) 0.05 % topical cream Apply  to affected area two (2) times a day. 15 g 0    varicella-zoster recombinant, PF, (Shingrix, PF,) 50 mcg/0.5 mL susr injection 0.5mL by IntraMUSCular route once now and then repeat in 2-6 months 0.5 mL 1    FreeStyle Lashawn 14 Day [place] misc USE TO SCAN SENSOR AT LEAST THREE TIMES DAILY AS DIRECTED 1 Each 0    glucose blood VI test strips (FreeStyle Precision Aleksandr Strips) strip Check once daily 100 Strip 1    fexofenadine (ALLEGRA) 180 mg tablet Take 180 mg by mouth daily.  cholecalciferol, vitamin D3, (Vitamin D3) 50 mcg (2,000 unit) tab Take  by mouth daily.  aspirin delayed-release 81 mg tablet Take  by mouth daily.  PSEUDOEPHEDRINE-guaiFENesin (Mucinex D)  mg per tablet Take 1 Tab by mouth every twelve (12) hours.  ondansetron hcl (ZOFRAN, AS HYDROCHLORIDE,) 4 mg tablet Take 1 Tab by mouth every eight (8) hours as needed for Nausea. 20 Tab 0    TF-FJ-KR-Fe-Min-Lycopen-Lutein (CENTRUM) 0.4-162-18 mg Tab Take 1 Tab by mouth daily.  vit B cmplx & C #11-ca-dha-Q10 (BRAIN MIGHT-DHA-CO Q10) 140-10-10 mg Tab Take 1 Tab by mouth daily.  calcium 600 mg cap Take 1 Tab by mouth daily.  Azelastine (ASTEPRO) 0.15 % (205.5 mcg) nasal spray 2 Sprays by Both Nostrils route as needed.        Past Surgical History:   Procedure Laterality Date    COLONOSCOPY N/A 5/31/2018    COLONOSCOPY performed by Imtiaz Espino [facility]    HX CHOLECYSTECTOMY      HX OTHER SURGICAL      convergent strabismus s/p corrective surgery      Lab Results   Component Value Date/Time    WBC 4.5 05/25/2021 02:13 PM    HGB 14.0 05/25/2021 02:13 PM    HCT 43.4 05/25/2021 02:13 PM    PLATELET 697 67/96/1905 02:13 PM    MCV 89.7 05/25/2021 02:13 PM     Lab Results   Component Value Date/Time    Cholesterol, total 133 02/17/2021 09:34 AM    HDL Cholesterol 51 02/17/2021 09:34 AM    LDL, calculated 65 02/17/2021 09:34 AM    LDL, calculated 58 06/05/2020 10:11 AM    Triglyceride 90 02/17/2021 09:34 AM     Lab Results   Component Value Date/Time    GFR est non-AA >60 09/03/2021 09:00 AM    GFR est AA >60 09/03/2021 09:00 AM    Creatinine 0.96 09/03/2021 09:00 AM    BUN 14 09/03/2021 09:00 AM    Sodium 142 09/03/2021 09:00 AM    Potassium 4.2 09/03/2021 09:00 AM    Chloride 106 09/03/2021 09:00 AM    CO2 29 09/03/2021 09:00 AM    Magnesium 2.0 05/22/2014 01:45 PM        Review of Systems   Respiratory: Negative for shortness of breath. Cardiovascular: Negative for chest pain. Physical Exam  Constitutional:       General: He is not in acute distress. Appearance: Normal appearance. He is not ill-appearing, toxic-appearing or diaphoretic. HENT:      Head: Normocephalic and atraumatic. Right Ear: External ear normal.      Left Ear: External ear normal.   Eyes:      General:         Right eye: No discharge. Left eye: No discharge. Conjunctiva/sclera: Conjunctivae normal.      Pupils: Pupils are equal, round, and reactive to light. Cardiovascular:      Rate and Rhythm: Normal rate and regular rhythm. Heart sounds: No murmur heard. No friction rub. No gallop. Pulmonary:      Effort: No respiratory distress. Breath sounds: Normal breath sounds. No wheezing or rales. Chest:      Chest wall: No tenderness. Musculoskeletal:         General: Normal range of motion. Cervical back: Normal range of motion and neck supple. Comments: Bunion on L foot   Skin:     General: Skin is warm and dry. Neurological:      Mental Status: He is alert and oriented to person, place, and time. Mental status is at baseline.       Coordination: Coordination normal.      Gait: Gait normal.      Comments: Sensory exam of the foot is normal, tested with the monofilament. Good pulses, no lesions or ulcers, good peripheral pulses. Psychiatric:         Mood and Affect: Mood normal.         Behavior: Behavior normal.         ASSESSMENT and PLAN    ICD-10-CM ICD-9-CM    1. Type 2 diabetes mellitus without complication, without long-term current use of insulin ([facility])      E11.9 250.00  DIABETES FOOT EXAM      LIPID PANEL   Controlled on Metformin continue A1c at goal   METABOLIC PANEL, COMPREHENSIVE      HEMOGLOBIN A1C WITH EAG      TSH 3RD GENERATION      CBC W/O DIFF   2. Gastroesophageal reflux disease without esophagitis  K21.9 530.81 LIPID PANEL      METABOLIC PANEL, COMPREHENSIVE   Continues on Protonix twice daily works well has Pepcid also   HEMOGLOBIN A1C WITH EAG      TSH 3RD GENERATION      CBC W/O DIFF   3. Down's syndrome  Q90.9 758.0 LIPID PANEL      METABOLIC PANEL, COMPREHENSIVE   Cared for by mother   HEMOGLOBIN A1C WITH EAG      TSH 3RD GENERATION      CBC W/O DIFF   4. Deep vein thrombosis (DVT) of proximal lower extremity, unspecified chronicity, unspecified laterality ([facility])  I82.4Y9 453.41 LIPID PANEL      METABOLIC PANEL, COMPREHENSIVE   Previously was on Coumadin until 2018 noticed on aspirin for clot prevention   HEMOGLOBIN A1C WITH EAG      TSH 3RD GENERATION      CBC W/O DIFF   5. SERGIO on CPAP  G47.33 327.23 LIPID PANEL    G80.38 X71.4 METABOLIC PANEL, COMPREHENSIVE   Compliant with CPAP missed his appointment with the sleep specialist due to family illness will reschedule   HEMOGLOBIN A1C WITH EAG      TSH 3RD GENERATION      CBC W/O DIFF   6.  Mixed hyperlipidemia  E78.2 272.2 LIPID PANEL      METABOLIC PANEL, COMPREHENSIVE      HEMOGLOBIN A1C WITH EAG   Controlled Lipitor 40 mg daily       TSH 3RD GENERATION      CBC W/O DIFF   Chronic cough improved with clemastine will be following up with pulmonary as well has some stable chronic lung abnormalities    Scribed by Diane Schmitt, as dictated by Dr. Xiomy Lopez Angel.     Current diagnosis and concerns discussed with pt at length. Pt understands risks and benefits or current treatment plan and medications, and accepts the treatment and medication with any possible risks. Pt asks appropriate questions, which were answered. Pt was instructed to call with any concerns or problems. I have reviewed the note documented by the scribe. The services provided are my own.   The documentation is accurate

## (undated) DEVICE — Device

## (undated) DEVICE — TOWEL 4 PLY TISS 19X30 SUE WHT

## (undated) DEVICE — CATH IV AUTOGRD BC PNK 20GA 25 -- INSYTE

## (undated) DEVICE — Z DISCONTINUED PER MEDLINE LINE GAS SAMPLING O2/CO2 LNG AD 13 FT NSL W/ TBNG FILTERLINE

## (undated) DEVICE — SYR 3ML LL TIP 1/10ML GRAD --

## (undated) DEVICE — BLOCK BITE ENDOSCP AD 21 MM W/ DIL BLU LF DISP

## (undated) DEVICE — FORCEPS BX L160CM DIA8MM GRSP DISECT CUP TIP NONLOCKING ROT

## (undated) DEVICE — NEONATAL-ADULT SPO2 SENSOR: Brand: NELLCOR

## (undated) DEVICE — BAG SPEC BIOHZRD 10 X 10 IN --

## (undated) DEVICE — ELECTRODE,RADIOTRANSLUCENT,FOAM,5PK: Brand: MEDLINE

## (undated) DEVICE — SOLIDIFIER MEDC 1200ML -- CONVERT TO 356117

## (undated) DEVICE — FORCEPS BX L240CM JAW DIA2.8MM L CAP W/ NDL MIC MESH TOOTH

## (undated) DEVICE — CONTAINER SPEC 20 ML LID NEUT BUFF FORMALIN 10 % POLYPR STS

## (undated) DEVICE — CATH IV AUTOGRD BC BLU 22GA 25 -- INSYTE

## (undated) DEVICE — HYPODERMIC SAFETY NEEDLE: Brand: MAGELLAN

## (undated) DEVICE — SET ADMIN 16ML TBNG L100IN 2 Y INJ SITE IV PIGGY BK DISP

## (undated) DEVICE — REUSABLE MARKED SPRING TIP GUIDEWIRE, 210 CM X 1.86 MM: Brand: CONMED

## (undated) DEVICE — NEEDLE HYPO 18GA L1.5IN PNK S STL HUB POLYPR SHLD REG BVL

## (undated) DEVICE — BASIN EMSIS 16OZ GRAPHITE PLAS KID SHP MOLD GRAD FOR ORAL

## (undated) DEVICE — SYR 10ML LUER LOK 1/5ML GRAD --

## (undated) DEVICE — YANKAUER,TAPERED BULBOUS TIP,W/O VENT: Brand: MEDLINE

## (undated) DEVICE — KENDALL RADIOLUCENT FOAM MONITORING ELECTRODE RECTANGULAR SHAPE: Brand: KENDALL

## (undated) DEVICE — 1200 GUARD II KIT W/5MM TUBE W/O VAC TUBE: Brand: GUARDIAN

## (undated) DEVICE — FCPS RAD JAW 4LC 240CM W/NDL -- BX/40

## (undated) DEVICE — SET ADMIN 16ML TBNG L100IN 2 Y INJ SITE IV PIGGY BK DISP (ORDER IN MULIPLES OF 48)